# Patient Record
Sex: FEMALE | Race: WHITE | HISPANIC OR LATINO | Employment: UNEMPLOYED | URBAN - METROPOLITAN AREA
[De-identification: names, ages, dates, MRNs, and addresses within clinical notes are randomized per-mention and may not be internally consistent; named-entity substitution may affect disease eponyms.]

---

## 2021-05-10 ENCOUNTER — PATIENT OUTREACH (OUTPATIENT)
Dept: OBGYN CLINIC | Facility: CLINIC | Age: 36
End: 2021-05-10

## 2021-05-10 ENCOUNTER — APPOINTMENT (OUTPATIENT)
Dept: LAB | Facility: HOSPITAL | Age: 36
End: 2021-05-10

## 2021-05-10 ENCOUNTER — ROUTINE PRENATAL (OUTPATIENT)
Dept: OBGYN CLINIC | Facility: CLINIC | Age: 36
End: 2021-05-10

## 2021-05-10 VITALS
BODY MASS INDEX: 31.61 KG/M2 | SYSTOLIC BLOOD PRESSURE: 108 MMHG | DIASTOLIC BLOOD PRESSURE: 76 MMHG | HEART RATE: 123 BPM | WEIGHT: 161 LBS | HEIGHT: 60 IN

## 2021-05-10 DIAGNOSIS — Z3A.24 24 WEEKS GESTATION OF PREGNANCY: ICD-10-CM

## 2021-05-10 DIAGNOSIS — Z34.92 PRENATAL CARE IN SECOND TRIMESTER: Primary | ICD-10-CM

## 2021-05-10 DIAGNOSIS — Z78.9 LANGUAGE BARRIER: ICD-10-CM

## 2021-05-10 LAB
ABO GROUP BLD: NORMAL
BACTERIA UR QL AUTO: ABNORMAL /HPF
BASOPHILS # BLD AUTO: 0.02 THOUSANDS/ΜL (ref 0–0.1)
BASOPHILS NFR BLD AUTO: 0 % (ref 0–1)
BILIRUB UR QL STRIP: ABNORMAL
BLD GP AB SCN SERPL QL: NEGATIVE
CLARITY UR: ABNORMAL
COLOR UR: ABNORMAL
EOSINOPHIL # BLD AUTO: 0 THOUSAND/ΜL (ref 0–0.61)
EOSINOPHIL NFR BLD AUTO: 0 % (ref 0–6)
ERYTHROCYTE [DISTWIDTH] IN BLOOD BY AUTOMATED COUNT: 14.6 % (ref 11.6–15.1)
GLUCOSE UR STRIP-MCNC: NEGATIVE MG/DL
HBV SURFACE AG SER QL: NORMAL
HCT VFR BLD AUTO: 38.7 % (ref 34.8–46.1)
HGB BLD-MCNC: 12.9 G/DL (ref 11.5–15.4)
HGB UR QL STRIP.AUTO: ABNORMAL
HYALINE CASTS #/AREA URNS LPF: ABNORMAL /LPF
IMM GRANULOCYTES # BLD AUTO: 0.05 THOUSAND/UL (ref 0–0.2)
IMM GRANULOCYTES NFR BLD AUTO: 1 % (ref 0–2)
KETONES UR STRIP-MCNC: ABNORMAL MG/DL
LEUKOCYTE ESTERASE UR QL STRIP: ABNORMAL
LYMPHOCYTES # BLD AUTO: 1.56 THOUSANDS/ΜL (ref 0.6–4.47)
LYMPHOCYTES NFR BLD AUTO: 18 % (ref 14–44)
MCH RBC QN AUTO: 28.3 PG (ref 26.8–34.3)
MCHC RBC AUTO-ENTMCNC: 33.3 G/DL (ref 31.4–37.4)
MCV RBC AUTO: 85 FL (ref 82–98)
MONOCYTES # BLD AUTO: 0.39 THOUSAND/ΜL (ref 0.17–1.22)
MONOCYTES NFR BLD AUTO: 4 % (ref 4–12)
NEUTROPHILS # BLD AUTO: 6.86 THOUSANDS/ΜL (ref 1.85–7.62)
NEUTS SEG NFR BLD AUTO: 77 % (ref 43–75)
NITRITE UR QL STRIP: NEGATIVE
NON-SQ EPI CELLS URNS QL MICRO: ABNORMAL /HPF
NRBC BLD AUTO-RTO: 0 /100 WBCS
PH UR STRIP.AUTO: 6.5 [PH]
PLATELET # BLD AUTO: 189 THOUSANDS/UL (ref 149–390)
PMV BLD AUTO: 10 FL (ref 8.9–12.7)
PROT UR STRIP-MCNC: ABNORMAL MG/DL
RBC # BLD AUTO: 4.56 MILLION/UL (ref 3.81–5.12)
RBC #/AREA URNS AUTO: ABNORMAL /HPF
RH BLD: POSITIVE
RPR SER QL: NORMAL
RUBV IGG SERPL IA-ACNC: >175 IU/ML
SP GR UR STRIP.AUTO: 1.03 (ref 1–1.03)
SPECIMEN EXPIRATION DATE: NORMAL
UROBILINOGEN UR QL STRIP.AUTO: 4 E.U./DL
WBC # BLD AUTO: 8.88 THOUSAND/UL (ref 4.31–10.16)
WBC #/AREA URNS AUTO: ABNORMAL /HPF

## 2021-05-10 PROCEDURE — 99203 OFFICE O/P NEW LOW 30 MIN: CPT | Performed by: OBSTETRICS & GYNECOLOGY

## 2021-05-10 PROCEDURE — 87624 HPV HI-RISK TYP POOLED RSLT: CPT | Performed by: OBSTETRICS & GYNECOLOGY

## 2021-05-10 PROCEDURE — G0145 SCR C/V CYTO,THINLAYER,RESCR: HCPCS | Performed by: STUDENT IN AN ORGANIZED HEALTH CARE EDUCATION/TRAINING PROGRAM

## 2021-05-10 PROCEDURE — 87086 URINE CULTURE/COLONY COUNT: CPT

## 2021-05-10 PROCEDURE — 81001 URINALYSIS AUTO W/SCOPE: CPT

## 2021-05-10 PROCEDURE — 83020 HEMOGLOBIN ELECTROPHORESIS: CPT

## 2021-05-10 PROCEDURE — 87591 N.GONORRHOEAE DNA AMP PROB: CPT | Performed by: OBSTETRICS & GYNECOLOGY

## 2021-05-10 PROCEDURE — 36415 COLL VENOUS BLD VENIPUNCTURE: CPT

## 2021-05-10 PROCEDURE — 87491 CHLMYD TRACH DNA AMP PROBE: CPT | Performed by: OBSTETRICS & GYNECOLOGY

## 2021-05-10 PROCEDURE — 80081 OBSTETRIC PANEL INC HIV TSTG: CPT

## 2021-05-10 NOTE — PROGRESS NOTES
MARY JACQUES met with Pt and Provider to assist with interpretation during pre krista h&p visit  Pt came accompanied by her friend  Pt reported she started pre  care in [facility]  Pt came to [address] last Monday  Pt is expecting her second pregnancy  First child was delivered in [facility] via C/S on   Pt did not brought any records with her  Pt main compliant was N/V and body aches  Pt was examined and provider gave name of over the counter medications  Pt will go for lab work today and will return to clinic in 4 weeks  Pt will meet with MARY JACQUES during next visit for pn assessment

## 2021-05-10 NOTE — PATIENT INSTRUCTIONS
[place] 1922 ¡Felicitaciones por Escobar Mantilla! Nos complace que nos haya elegido para ser de león proveedor de keenan servicios de kyle médica risa de león [employer]  De León kyle, la kyle de de león hijo por nacer (niños) y de león nayeli son importante para nosotros  Ahora, más que [place], de león kyle será lo más importante para usted  El crecimiento y el progreso de de león bebé pueden verse afectados por la forma en que usted se cuide  Es [place] buena idea planificar con anticipación  Es un hecho conocido que las mujeres que reciben atención mèdica desde temprano en  Tomas Viktor y risa todo el embarazo tienen bebés más saludables  Se programarán visitas para usted risa todo Tomas Viktor  Es de león deber cumplir con keenan citas y seguir las instrucciones para de león cuidado  El Kavya Rowley de visitas será decidido por de león médico  No tengas miedo de hacer preguntas  Hable con keenan enfermeras y proveedores sobre keenan planes de parto y cualquier inquietud que pueda tener  Janet Simper de Verificación  ; Medicina Materno Fetal (MFM) al [phone] para programar de león kirti   o Kirti de 1 hora, solo se permite 1 persona de apoyo, NO niños    ; Análisis de remy: complete antes de de león kirti de H&P   NO tiene que ayunar para ningún análisis de remy a menos que se lo indiquen  - CBC, Tipo de Gabbie Carmona y 200 Exempla [place] de anticuerpos, Análisis de Anthony Membreno de glucosa al leann, VIH, sífilis, hepatitis B    ; Shahzad Reynolds y física: kirti de H&P   examen físico   Examen pélvico: Ruthell Ralph y Clamidia   Si es necesario: Papanicolaou    Plan:  ; Visitas prenatales de rutina cada 4 semanas hasta las 28 semanas   En keenan visitas prenatales:  - Monitoreo de los el latidos del corazón del bebé y medir de león bethanie en crecimiento, Recolecte herbie Christiana de Lupis liriano, y se tomara de león peso y presión arterial      Señales de [place] en el embarazo: Costella Owens  [phone]    1  Sangrado vaginal  2   Dolor abdominal zurdo que no desaparece  3  Fiebre (más de 100 4 y no se luis miguel con Tylenol)  4  Vómitos persistentes que luis más de 24 horas  5  dolor de pecho  6  Dolor o ardor al orinar  7  Dolor de arina severo que no se resuelve con Tylenol  8  Visión borrosa o nayan puntos en griffin visión  9  Hinchazón repentina de griffin yuki o tima  10  Enrojecimiento, hinchazón o dolor en herbie pierna  11  Un aumento de peso repentino en pocos días  12  Contar los movimientos fetales del bebé  (después de 28 semanas o el sexto mes de embarazo)  15  Herbie pérdida de líquido acuoso de la vagina: puede ser un chorro, un goteo o herbie humedad continua  14  Después de 20 semanas de embarazo, calambres rítmicos (más de 4 por hora) o menstruales chas dolor bajo / pélvico      Manténgase saludable risa griffin embarazo:   · Consuma alimentos saludables y variados  Alimentos saludables incluyen frutas, verduras, panes de francisco integral, alimentos lácteos bajos en grasa, frijoles, alec magras y pescado  Vevay líquidos chas se le haya indicado  Pregunte cuánto líquido debe jayce cada día y cuáles líquidos son los más adecuados para usted  o Cafeína: no está osmar cómo la cafeína afecta el embarazo  Limite griffin consumo de cafeína para evitar posibles problemas de kyle   - Limite la cafeína a menos de 200 miligramos por día  - La cafeína se puede encontrar en el café, el té, la cola, las bebidas deportivas y el chocolate  o  Alimentos que contienen melyssa: el melyssa se encuentra naturalmente en remy todos los tipos de pescados y mariscos  Algunos tipos de peces absorben niveles más altos de melyssa que pueden ser dañinos para un bebé patrick  Coma solo pescado y mariscos bajos en mleyssa  - Limite griffin consumo de pescado a 2 porciones por semana    - Elija pescado con bajo contenido de meylssa, chas atún osmar enlatado, camarones, cangrejo, salmón, bacalao o tilapia   o No coma pescado con alto contenido de [employer] pez miki, el pez [place] norman, la caballa real y el tiburón  - Cada semana, puede comer hasta 12 onzas de pescado o mariscos que tienen bajos niveles de [employer]  Estos incluyen camarones, atún osmar enlatado, salmón, abadejo y [place]  o Coma solo 6 onzas de atún bowser (bowser) por semana  El atún bowser tiene más melyssa que el atún [place]  · [id number] [place]  Griffin necesidad de ciertas vitaminas y [facility], chas el ácido fólico, aumenta risa el [facility]  Las vitaminas prenatales proporcionan algunas de las vitaminas y minerales adicionales que usted necesita  Las vitaminas prenatales también podrían ayudar a disminuir el riesgo de ciertos defectos de nacimiento  · Pregunte cuánto peso usted debe aumentar risa griffin embarazo  Demasiado aumento de peso o muy poco puede ser poco saludable para usted y griffin bebé  · Ejercicio: hable con griffin proveedor de [employer] ejercicio  El ejercicio moderado puede ayudarlo a mantenerse en forma  Griffin proveedor de [employer] ayudará a planificar un programa de ejercicios que sea seguro para usted risa el [facility]  · Ladi muchos líquidos mientras hace ejercicio para mantenerse hidratado  Tenga cuidado para evitar el sobrecalentamiento  o EVITE los ejercicios que pueden hacer que pierda el equilibrio   o Actividades que pueden poner a griffin bebé en riesgo, es decir, montar a gabby, bucear, esquiar o hacer snowboard  o Después de griffin primer trimestre, evite los ejercicios que requieren que se acueste boca arriba   o EVITE exceder herbie frecuencia cardíaca mayor de 140 latidos por minuto  Siempre que pueda mantener herbie conversación mientras hace ejercicio, es probable que griffin ritmo cardíaco sea aceptable   ; Siempre use el cinturón de seguridad   El cinturón de hombro debe estar sobre el pecho (entre los senos) y lejos del faby     Asegure el cinturón de regazo debajo de griffin vientre para que se ajuste cómodamente en keenan caderas y Romeo pélvico   ; Realizar el ejercicio de [place]   Imagínese deteniendo el flujo de orina, contrayendo los músculos de griffin piso pélvico  Mantenga ramona contracción risa 10 segundos y suelte   Se pueden repetir 10-20 veces por día  · No fume  Si usted fuma, nunca es demasiado tarde para dejar de hacerlo  Fumar aumenta el riesgo de aborto espontáneo y otros problemas de kyle risa griffin Veola Hung  Fumar puede causar que griffin bebé nazca antes de tiempo o que pese menos al nacer  Solicite información a griffin médico si usted necesita ayuda para dejar de fumar  · No consuma alcohol  El alcohol pasa de griffin cuerpo al bebé a través de la placenta  Puede afectar el desarrollo del cerebro de griffin bebé y provocar el síndrome de alcoholismo fetal (SAF)  SAF es un seymour de condiciones que causan [address], de comportamiento y de crecimiento  · Consulte con griffin médico antes de jayce cualquier medicamento  Muchos medicamentos pueden perjudicar a griffin bebé si usted los kevin [address]  No tome ningún medicamento, vitaminas, hierbas o suplementos sin petar consultar con griffin médico    · Nunca  use drogas ilegales o de la [place] (chas marihuana o cocaína) mientras está embarazada  Consejos de seguridad:   · Evite jacuzzis y saunas  No use un jacuzzi o un sauna mientras usted está embarazada, especialmente risa el primer trimestre  Los [facility] y los saunas aumentan la temperatura de griffin bebé y el riesgo de defectos de nacimiento  · Evite la toxoplasmosis  [place] es herbie infección causada por comer carne cruda o estar cerca del excremento de un tera infectado  [place] puede causar malformaciones congénitas, aborto espontáneo y Khoi Goodwinese las tima después de tocar carne cruda  Asegúrese de que la carne esté annika cocida antes de comerla  Evite los huevos crudos y la [place]ta [place]  Use guantes o pida que alguien la ayude a limpiar la caja de arena del tera mientras usted Otila Hopper     · Consulte con griffin médico acerca de viajar  El [address] cómodo para viajar es risa el ryder trimestre  Pregunte a griffin médico si usted puede viajar después de las 36 semanas  Es posible que no pueda viajar en avión después de las [address]  También le puede recomendar que evite largos viajes por carretera  Programe un control con griffin médico u obstetra según lo indicado:  Vaya a todas keenan citas prenatales risa griffin embarazo  Anote keenan preguntas para que se acuerde de hacerlas risa keenan visitas  [place] y vómito en el embarazo       CUIDADO AMBULATORIO:   La náusea y el vómito en el embarazo  pueden suceder a cualquier hora del día  Estos síntomas usualmente comienzan antes de la semana 9 del embarazo y terminan para la semana 14 (ryder trimestre)  Algunas mujeres pueden tener náusea o vómito por un tiempo prolongado  Estos síntomas pueden afectar a algunas mujeres risa el embarazo  La náusea y el vómito no dañan a griffin bebé  Estos síntomas pueden dificultarle keenan actividades diarias  Pregúntele a griffin Pepper Chance vitaminas y minerales son adecuados para usted  · Usted vomita más de 4 veces en 1 día  · Usted no ha podido retener líquidos en el estómago por más de 1 día  · Usted pierde más de 2 libras  · Usted tiene fiebre  · Keenan náuseas y vómito continúan por más de 14 semanas  · Usted tiene preguntas o inquietudes acerca de griffin condición o cuidado  El tratamiento  para la náusea y el vómito en el embarazo generalmente no es necesario  Usted puede [employer] alimentos que come y en keenan actividades para ayudar a controlar keenan síntomas  Es posible que usted necesite probar varias cosas para determinar qué funciona mejor para usted  Hable con griffin médico si keenan síntomas no mejoran con los cambios que se recomiendan a continuación  Es posible que usted necesite vitamina B6 y medicamento si estos cambios no ayudan o si keenan síntomas se vuelven graves     Cambios de nutrición que usted puede realizar para controlar la náusea y el vómito:   · Coma porciones pequeñas risa el día en vez de 3 comidas con porciones grandes  Es más probable que usted tenga náusea y vómito cuando griffin estómago está vacío  Consuma alimentos bajos en grasa y ricos en proteínas  Ejemplos son Colan Landau, frijoles, pavo y qasim sin andreina Diehl, chas galletas saladas, cereal seco o un sandwich chico antes de [facility] [employer]  · Coma galletas saladas o pan sarna antes de levantarse de griffin cama por la mañana  Levántese de la cama lentamente  Los movimientos repentinos podrían provocarle mareos y [place]  · Consuma alimentos blandos cuando se sienta con náuseas  Ejemplos de alimentos blandos son el pan saran, cereal seco, pasta sin Marshal Furlough y pan  Otros alimentos blandos incluyen a las [employer], plátanos, gelatina y pretzels  Evite los alimentos condimentados, grasosos y fritos  Evite otros alimentos que le provoquen náuseas  · [place] líquidos que contengan gengibre  [place] refresco de gengibre hecho con gengibre real o té de gengibre hecho con gengibre fresco rallado  Las [employer] o dulces de gengibre también podrían ayudar a aliviar la náusea y el vómito  · [place] líquidos entre alimentos en vez de tomarlos con los alimentos  Espere al menos 30 minutos después de comer para jayce líquidos  [place] cantidades pequeñas de líquidos con frecuencia risa el día para evitar la deshidratación  Consulte cuál es la cantidad de líquido que usted debería consumir al día  Otros cambios que usted puede realizar para controlar la náusea y el vómito:   · Evite los olores que la [place]  Los olores stevie podrían provocar que [employer] náuseas y el vómito, o podrían empeorarlo  Camine un poco, prenda un ventilador o trate de dormir con la ventana abierta para respirar aire fresco  Cuando esté cocinando, scot las ventanas para eliminar el olor que podría provocarle náuseas    · No se cepille keenan dientes inmediatamente después de comer  si eso le provoca náuseas  · Descanse cuando lo necesite  Comience herbie actividad lentamente y vuelva a griffin rutina normal conforme se empiece a sentir mejor  · Hable con griffin médico acerca de las vitaminas prenatales  Las vitaminas prenatales pueden provocar náuseas a algunas mujeres  Trate de tomárselas por la noche o con un bocadillo  Si butch cambio no le [place], griffin médico podría recomendarle un tipo de vitamina diferente  · No use ningún medicamento, vitamina o suplemento para controlar keenan síntomas sin antes consultarlo con griffin médico   Varios medicamentos pueden dañar a griffin bebé que no ha nacido  · El ejercicio de ligero a moderado  podría ayudar a aliviar keenan síntomas  También podría ayudarla a dormir mejor por la noche  Pregunte a griffin médico acerca del mejor plan de ejercicio para usted  Beneficios de la lactancia materna   son menos propensos a desarrollar alergias   Tienen herbie mayor protección contra la meningitis bacteriana   Tienen menos infecciones del oído medio   Tienen menos dificultades de aprendizaje    Tienen menos dificultades de comportamiento   Tienen un coeficiente intelectual más alto   Tienen tasas más bajas de enfermedades respiratorias   Tienen oscar obesidad    Son menos propensos a desarrollar diabetes juvenil y algunos cánceres infantiles   Tienen menos probabilidades de morir por Síndrome de erte [facility]   Un bebé más saludable significa menos visitas al médico y a la farmacia   La lactancia materna es ecológica  No hay nada que tirar   La lactancia materna es gratis; La fórmula puede costar más de $ 1000 risa el primer año de lakesha   Hay menos sangrado vaginal inmediatamente después del parto y un retorno más rápido a griffin tamaño anterior al [facility]  Las [place] que amamantan risa un total de 2 años tienen  ;  Herbie disminución del 40% en el riesgo de cáncer de seno  ; Disminución del riesgo de cáncer de ovario   ; Tasas más bajas de osteoporosis, diabetes y enfermedades del corazón  Importancia de la lactancia materna exclusiva   Al proporcionar el alimento ideal para el crecimiento y desarrollo saludable de los bebés, solo se les alimenta con [place], esto es amamantamiento exclusivo   La lactancia materna [facility] primeros 6 meses es muy importante para mejorar la kyle de un bebé y reducir las enfermedades infantiles  Lactancia materna exclusiva risa los primeros 6 meses  [address] [facility] Estadounid[facility] de Pediatría recomienda la lactancia materna exclusiva risa los primeros seis meses y la lactancia materna continua con suplementos de sólidos risa los próximos 6 meses  Inicio temprano de la lactancia materna   Las mujeres que alimentan a keenan bebés dentro de la primera hora de nacimiento se conocen chas inicio temprano de la lactancia materna y aseguran que el recién nacido reciba calostro   El calostro es rico en anticuerpos y nutrientes esenciales  Compartiendo la habitación   Puede estabilizar la respiración y la frecuencia cardíaca del recién nacido   Reduce el llanto   Mejorar la capacidad del recién nacido para mantener la temperatura y los niveles de azúcar en la remy   Estimulación temprana del sistema inmunitario del bebé   efectos calmantes   Enlace más fácil y rápido   El compartir la habitación promueve conocer a griffin recién nacido   El alojamiento conjunto facilita la lactancia materna   Las mujeres que se alojan con keenan recién nacidos producen [place] y producen un buen suministro de [place]   Se hace más fácil reconocer las señales de alimentación del bebé   Los bebés tienen sesiones de lactancia más largas   Las mujeres que comparten habitación con keenan recién nacidos tienen más probabilidades de amamantar exclusivamente en comparación con las mujeres que están separadas de keenan recién nacidos    Piel con piel   El contacto piel con piel debe ocurrir independientemente de la preferencia de alimentación de herbie jitendra o el modo de [place]   Después del parto de griffin bebé, es importante que herbie madre esther y griffin bebé tengan un contacto ininterrumpido de piel a piel   El contacto piel con piel debe ocurrir inmediatamente después del nacimiento risa al menos herbie o dos horas y [place] después de la primera alimentación para las madres que [place]   El contacto piel con piel significa colocar recién nacidos secos y sin ropa sobre el pecho desnudo de griffin [place], con mantas calientes cubriendo la espalda del bebé   Todos los procedimientos de rutina, chas las evaluaciones de [place] y recién nacidos, pueden realizarse risa el contacto piel con piel o pueden retrasarse hasta después del período sensible inmediatamente después del nacimiento   Estamos orgullosos de ofrecer amplios servicios educativos y de apoyo para alentar a las madres a amamantar   Soniya servicio ofrece información, educación y [de-identified] para mujeres que yosi amamantar  Las [place] pueden dejar un mensaje o herbie pregunta sobre la lactancia en línea en cualquier momento del día  Las enfermeras responderán dentro de las 72 horas   La línea de respuesta de lactancia materna es [phone] ([phone])  NO deje mensajes urgentes o emergentes en esta línea de mensaje  Comuníquese con griffin médico Tereasa Battiest si tiene alguna pregunta o inquietud urgente   En muna de sospecha de herbie afección médica de emergencia, [facility] [place]      Attaching your baby at the Breast (English): [email or web address] [email or web address]-items/attaching-[email or web address]-baby-[email or web address]-the-breast/?[email or web address]    Attaching your baby at the Breast (French):  [email or web address] [email or web address]-items/[email or web address]      Coronavirus (COVID-19) pregnancy FAQs: Medical experts answer your questions  From [employer] com cy  [email or web address]  bc (courtesy of [facility])  As of 3/18/20  By Dontae Mims 40  Medically reviewed by Society for Maternal-Fetal Medicine       We asked parents-to-be to send us their most pressing questions about coronavirus (COVID-19)  Among them: Is it still safe to deliver in a hospital? What if my ob-gyn has the virus? We sent those questions to the top docs at the Society for Maternal-Fetal Medicine  Here are their answers  The coronavirus (COVID-19) pandemic has been declared a national emergency in the [facility] by [employer]  Moms-to-be like you are concerned about everything from getting medicines to managing disruptions at work  But above and beyond any worry about lifestyle changes is a focus on your health and the impact of COVID-19 on your pregnancy  We asked obstetrics doctors who handle the most complicated pregnancy issues to answer your questions about the coronavirus  Here are their responses, provided by Dr Michaela Kennedy and her colleagues at the Society for Reform 250  Am I at more risk for COVID-19 if I'm pregnant? We don't know  Pregnancy does change your immune system in ways that might make you more susceptible to viral respiratory infections like COVID-19  If you become infected, you might also be at higher risk for more severe illness compared to the general population  Although this does not appear to be the case with COVID-19, based on limited data so far, a higher risk has been true for pregnant women with other coronavirus infections (SARS-CoV and MERS-CoV) and other viral respiratory infections, such as flu  It's important to take preventive actions to avoid infection, such as washing your hands often and avoiding people who are sick  How might coronavirus affect my pregnancy? Again, we don't know   Women with other coronavirus infections (such as SARS-CoV) did not have miscarriage or stillbirth at higher rates than the general population  We know that having other respiratory viral infections during pregnancy, such as flu, has been associated with problems like low birth weight and  birth  Also, having a high fever early in pregnancy may increase the risk of certain birth defects  Could I transmit coronavirus to my baby during pregnancy or delivery? We don't know whether you could transmit COVID-19 to your baby before or during delivery  However, among the few case studies of infants born to mothers with COVID-23 published in peer-reviewed literature, none of the infants tested positive for the virus  Also, there was no virus detected in samples of amniotic fluid or breast milk  There have been a few reports of newborns as young as a few days old with infection, suggesting that a mother can transmit the infection to her infant through close contact after delivery  There have been no reports of mother-to-baby transmission for other coronaviruses (MERS-CoV and SARS-CoV), although only limited information is available  Is it safe for me to deliver at a hospital where there have been COVID-19 cases? Yes  We know that COVID-19 is a very scary virus  The good news is that hospitals are taking great precautions to keep patients and healthcare providers safe  When a patient is even suspected to have COVID-19, they are placed in a negative pressure room  (Think of these rooms as vacuums that suck and filter the air so it's safe for the other people in the hospital)  This makes it possible for you to deliver at the hospital without putting you or your baby at risk  Hospitals are also implementing stricter visiting policies to keep patients safe  It's worth calling your hospital to check if there are any new regulations to be aware of      What plans should I make now in case the hospital system is overwhelmed when it's time for me to deliver? This is a great question to talk with your doctor or midwife about when you're 34 to 36 weeks pregnant  Every hospital is making different plans for dealing with this scenario  I work in healthcare  Should I ask my doctor to excuse me from work until the baby is born? What if I work in a school, the travel [place] [address], or some other high-risk setting? Healthcare facilities should take care to limit the exposure of pregnant employees to patients with confirmed or suspected COVID-19, just as they would with other infectious cases  If you continue working, be sure to follow the CDC's risk assessment and infection control guidelines  If you work in a school, travel [place] [address], or other high-risk setting, talk with your employer about what it's doing to protect employees and minimize infection risks  Wash your hands often  What if my OB gets COVID-19? If your doctor or midwife tests positive for COVID-19, they will need to be quarantined until they recover and are no longer at risk of transmitting the virus  In this case, you'll be assigned to another OB in your doctor's practice (or you may choose another practitioner yourself)  Ask your new OB or your doctor's office if you should self-quarantine or be tested for the virus  (It will depend on when you last saw your provider and when that person tested positive )    Should we hold off on trying to conceive because of COVID-19? At this time, there's no reason to hold off on trying to get pregnant, but the data we have is really limited  For example, we don't think the virus causes birth defects or increases your risk of miscarriage  But we don't know whether you could transmit COVID-19 to your baby before or during delivery  We also don't know if the virus lives in semen or can be sexually transmitted  We have a babymoon scheduled in the next few months - should we cancel?   If you're planning to travel internationally or on a cruise, you should strongly consider canceling  At this time, the virus has reached more than 140 countries, and there are travel bans to [place], most of [place], and [place]  Places where large numbers of people gather are at highest risk, especially airports and cruise [place]  If you're planning travel in the [place] note that any travel setting increases your risk of exposure, and there may be travel bans even in [place] by the time you're scheduled to go  Even if you're state is not blocked, you'll definitely want to avoid traveling to communities where the virus is spreading  To find out where these places are, check The [place] Times map based on CDC data  For the most current advice to help you avoid exposure, check the CDC's COVID-19 travel page  Will the hospital separate me from my  and keep the baby in quarantine? If you test positive for COVID-19 or have been exposed but have no symptoms, the CDC, [employer], and the Society for [place] [employer] all recommend that you be  from your baby to decrease the risk of transmission to the baby  This would last until you are no longer at risk of transmitting the virus  If you do not have COVID-19 and have not been exposed to the virus, the hospital will not separate you from your   My hospital is restricting visitors and only allowing one support person  If my support person leaves after the delivery, will they be allowed to come back? Every hospital has different policies  Contact your hospital or labor and delivery unit a week or so before delivery to get the most up-to-date restrictions  In general, if your support person needs to leave, they would be allowed back unless they knew they were exposed to COVID-19 after leaving your company  [facility] understands that the coronavirus (COVID-19) pandemic is an evolving story and that your questions will change over time   We'll continue asking moms and dads in our Community what they want to know, and we'll get the answers from experts to keep them - and you - informed and supported  My mom was planning to fly here to help me care for my new baby after delivery  Should I tell her not to come? Yes  If your mom is over 61 or has any serious chronic medical conditions (such as heart disease, lung disease, or diabetes), she is at higher risk of serious illness from COVID-19 and should avoid air travel  And remember that any travel setting increases a person's risk of exposure  So, it may be risky to have her around the baby after she has been traveling  For the most current advice on traveling, check the [facility]'s COVID-19 travel page  [facility] understands that the coronavirus pandemic is an evolving story and that your questions will change over time  We'll continue asking moms and dads in our Community what they want to know, and we'll get the answers from experts to keep them - and you - informed and supported

## 2021-05-11 LAB
BACTERIA UR CULT: NORMAL
C TRACH DNA SPEC QL NAA+PROBE: NEGATIVE
HIV 1+2 AB+HIV1 P24 AG SERPL QL IA: NORMAL
N GONORRHOEA DNA SPEC QL NAA+PROBE: NEGATIVE

## 2021-05-12 ENCOUNTER — HOSPITAL ENCOUNTER (EMERGENCY)
Facility: HOSPITAL | Age: 36
Discharge: HOME | End: 2021-05-12
Attending: EMERGENCY MEDICINE | Admitting: EMERGENCY MEDICINE
Payer: COMMERCIAL

## 2021-05-12 VITALS
TEMPERATURE: 98.8 F | DIASTOLIC BLOOD PRESSURE: 77 MMHG | RESPIRATION RATE: 18 BRPM | HEART RATE: 84 BPM | BODY MASS INDEX: 30.89 KG/M2 | OXYGEN SATURATION: 99 % | WEIGHT: 159.39 LBS | SYSTOLIC BLOOD PRESSURE: 101 MMHG

## 2021-05-12 DIAGNOSIS — Z20.822 SUSPECTED COVID-19 VIRUS INFECTION: Primary | ICD-10-CM

## 2021-05-12 LAB
HGB A MFR BLD: 2.4 % (ref 1.8–3.2)
HGB A MFR BLD: 97.6 % (ref 96.4–98.8)
HGB F MFR BLD: 0 % (ref 0–2)
HGB FRACT BLD-IMP: NORMAL
HGB S MFR BLD: 0 %
HPV HR 12 DNA CVX QL NAA+PROBE: NEGATIVE
HPV16 DNA CVX QL NAA+PROBE: NEGATIVE
HPV18 DNA CVX QL NAA+PROBE: NEGATIVE
SARS-COV-2 RNA RESP QL NAA+PROBE: POSITIVE

## 2021-05-12 PROCEDURE — U0005 INFEC AGEN DETEC AMPLI PROBE: HCPCS | Performed by: PHYSICIAN ASSISTANT

## 2021-05-12 PROCEDURE — U0003 INFECTIOUS AGENT DETECTION BY NUCLEIC ACID (DNA OR RNA); SEVERE ACUTE RESPIRATORY SYNDROME CORONAVIRUS 2 (SARS-COV-2) (CORONAVIRUS DISEASE [COVID-19]), AMPLIFIED PROBE TECHNIQUE, MAKING USE OF HIGH THROUGHPUT TECHNOLOGIES AS DESCRIBED BY CMS-2020-01-R: HCPCS | Performed by: PHYSICIAN ASSISTANT

## 2021-05-12 PROCEDURE — 99284 EMERGENCY DEPT VISIT MOD MDM: CPT | Performed by: PHYSICIAN ASSISTANT

## 2021-05-12 PROCEDURE — 99283 EMERGENCY DEPT VISIT LOW MDM: CPT

## 2021-05-12 RX ORDER — MELATONIN
2000 DAILY
Qty: 60 TABLET | Refills: 0 | Status: SHIPPED | OUTPATIENT
Start: 2021-05-12

## 2021-05-12 RX ORDER — MULTIVIT WITH MINERALS/LUTEIN
1000 TABLET ORAL 2 TIMES DAILY
Qty: 60 TABLET | Refills: 0 | Status: SHIPPED | OUTPATIENT
Start: 2021-05-12

## 2021-05-12 RX ORDER — MULTIVITAMIN
1 TABLET ORAL DAILY
Qty: 30 TABLET | Refills: 0 | Status: SHIPPED | OUTPATIENT
Start: 2021-05-12

## 2021-05-12 NOTE — ED PROVIDER NOTES
History  Chief Complaint   Patient presents with    Cough     cough and sore throat x 2 days       This is a 31 y/o female who presents with cough, sore throat, and bodyaches  She admits to Recent travel from [facility]  Cough, sore throat, body aches x last    with similar symptoms    She is currently 24 weeks pregnant , pregnancy uncomplicated and sees OB/GYN here  Denies SOB, CP or n/v/d  She denies headache  She is able to keep down fluids and does not have any diarrhea  None       History reviewed  No pertinent past medical history  History reviewed  No pertinent surgical history  History reviewed  No pertinent family history  I have reviewed and agree with the history as documented  E-Cigarette/Vaping     E-Cigarette/Vaping Substances     Social History     Tobacco Use    Smoking status: Never Smoker    Smokeless tobacco: Never Used   Substance Use Topics    Alcohol use: Never     Frequency: Never    Drug use: Never       Review of Systems   Constitutional: Positive for fatigue and fever  Negative for chills  HENT: Positive for congestion and sore throat  Negative for ear pain and postnasal drip  Eyes: Negative for pain and visual disturbance  Respiratory: Positive for cough  Negative for shortness of breath  Cardiovascular: Negative for chest pain and palpitations  Gastrointestinal: Negative for abdominal pain and vomiting  Genitourinary: Negative for dysuria and hematuria  Musculoskeletal: Negative for arthralgias and back pain  Skin: Negative for color change and rash  Neurological: Negative for seizures, syncope, light-headedness and headaches  All other systems reviewed and are negative  Physical Exam  Physical Exam  Vitals signs and nursing note reviewed  Constitutional:       General: She is not in acute distress  Appearance: She is well-developed  She is not ill-appearing, toxic-appearing or diaphoretic     HENT: Head: Normocephalic and atraumatic  Eyes:      Conjunctiva/sclera: Conjunctivae normal    Neck:      Musculoskeletal: Neck supple  Cardiovascular:      Rate and Rhythm: Normal rate and regular rhythm  Heart sounds: No murmur  Pulmonary:      Effort: Pulmonary effort is normal  No respiratory distress  Breath sounds: Normal breath sounds  No stridor  No wheezing, rhonchi or rales  Abdominal:      Palpations: Abdomen is soft  Tenderness: There is no abdominal tenderness  Comments: Gravid     Musculoskeletal: Normal range of motion  Skin:     General: Skin is warm and dry  Neurological:      General: No focal deficit present  Mental Status: She is alert  Psychiatric:         Mood and Affect: Mood normal          Behavior: Behavior normal          Vital Signs  ED Triage Vitals [05/12/21 1100]   Temperature Pulse Respirations Blood Pressure SpO2   98 8 °F (37 1 °C) 84 18 101/77 99 %      Temp Source Heart Rate Source Patient Position - Orthostatic VS BP Location FiO2 (%)   Oral -- -- Left arm --      Pain Score       Worst Possible Pain           Vitals:    05/12/21 1100   BP: 101/77   Pulse: 84         Visual Acuity      ED Medications  Medications - No data to display    Diagnostic Studies  Results Reviewed     Procedure Component Value Units Date/Time    Novel Coronavirus (Covid-19),PCR [facility] - 24 Hour Routine [[id number]]     Lab Status: No result Specimen: Nasopharyngeal Swab                  No orders to display              Procedures  Procedures         ED Course  ED Course as of May 12 1215   Wed May 12, 2021   1139                                 SBIRT 22yo+      Most Recent Value   SBIRT (25 yo +)   In order to provide better care to our patients, we are screening all of our patients for alcohol and drug use  Would it be okay to ask you these screening questions? No Filed at: 05/12/2021 1109   Initial Alcohol Screen: US AUDIT-C    1   How often do you have a drink containing alcohol?  0 Filed at: 05/12/2021 1109   2  How many drinks containing alcohol do you have on a typical day you are drinking? 0 Filed at: 05/12/2021 1109   3a  Male UNDER 65: How often do you have five or more drinks on one occasion? 0 Filed at: 05/12/2021 1109   3b  FEMALE Any Age, or MALE 65+: How often do you have 4 or more drinks on one occassion? 0 Filed at: 05/12/2021 1109   Audit-C Score  0 Filed at: 05/12/2021 1109   KELLE: How many times in the past year have you    Used an illegal drug or used a prescription medication for non-medical reasons? Never Filed at: 05/12/2021 1109                    MDM  Number of Diagnoses or Management Options  Suspected COVID-19 virus infection: new and requires workup  Diagnosis management comments: Patient was seen and examined  Vitals were stable  FHTs good  Strict anticipatory guidance and return precaution were discussed at length  She was given rx for vitamin D, vitamin C, MVI  Primary care referral to [facility] was also ordered  All questions were answered to her satisfaction prior to discharge via the   She remained stable while under my care in the Emergency department  Amount and/or Complexity of Data Reviewed  Clinical lab tests: ordered    Risk of Complications, Morbidity, and/or Mortality  Presenting problems: low  Diagnostic procedures: low  Management options: low    Patient Progress  Patient progress: stable      Disposition  Final diagnoses:   None     ED Disposition     None      Follow-up Information    None         Patient's Medications    No medications on file     No discharge procedures on file      PDMP Review     None          ED Provider  Electronically Signed by           Rakesh Monique PA-C  05/12/21 2344

## 2021-05-12 NOTE — DISCHARGE INSTRUCTIONS
You may start:    Vitamin D3 2000 IU PO Daily  Vitamin C 1g PO every 12 hours  Multivitamin Daily    Please return to the ED if your O2 levels drop below 90% at rest if you have a pulse ox at home

## 2021-05-14 LAB
LAB AP GYN PRIMARY INTERPRETATION: NORMAL
Lab: NORMAL

## 2021-05-18 ENCOUNTER — TELEMEDICINE (OUTPATIENT)
Dept: OBGYN CLINIC | Facility: CLINIC | Age: 36
End: 2021-05-18

## 2021-05-18 DIAGNOSIS — U07.1 COVID-19 AFFECTING PREGNANCY IN SECOND TRIMESTER: Primary | ICD-10-CM

## 2021-05-18 DIAGNOSIS — Z34.92 PRENATAL CARE IN SECOND TRIMESTER: ICD-10-CM

## 2021-05-18 DIAGNOSIS — Z3A.25 25 WEEKS GESTATION OF PREGNANCY: ICD-10-CM

## 2021-05-18 DIAGNOSIS — Z98.891 HISTORY OF CESAREAN DELIVERY: ICD-10-CM

## 2021-05-18 DIAGNOSIS — O98.512 COVID-19 AFFECTING PREGNANCY IN SECOND TRIMESTER: Primary | ICD-10-CM

## 2021-05-18 PROCEDURE — PNV: Performed by: NURSE PRACTITIONER

## 2021-05-18 NOTE — LETTER
Work Letter    Alea Goodrich  1985  [address] [facility] [address]    Dear Sergei Askew,      05/18/21        Your employee is a patient at [facility]  We recommend that all pregnant women:    1  Have a well-ventilated workspace  2  Wear low-heeled shoes  3  Work no more than 40 hours per week  4  Have a 15 minute break every 2 hours and at least 30 minutes for a meal break  5  Use good body mechanics by bending at your knees to avoid back strain and lift no more than 20 pounds without assistance  Will need assistance with lifting over 20 lbs  6  Have ready access to bathrooms and water  She may continue to work until her due date unless medical complications arise  We anticipate she may return to work in 6-8 weeks after delivery       Sincerely,  1 Mike Lam

## 2021-05-18 NOTE — LETTER
[facility] Letter    Momo Love  1985  [address] [facility] [address] [address]       05/18/21          Momo Love is a patient and under our care in our office  Momo Castillo's Estimated Date of Delivery: 8/28/21  Any questions or concerns feel free to contact our office       Thank you,  [address]  [address] [facility]/Laisha Chavez 15  [address] [facility]/Erick Pillai [address] [place] [address]/[address]  [phone]

## 2021-05-18 NOTE — LETTER
Proof of Pregnancy Letter    Hansel Navarro  1985  [address] [facility] [address]        05/18/21      Dary Crespo is a patient at our facility  Hansel Navarro Estimated Date of Delivery: 8/28/21       Any questions or concerns, please feel free to contact our office      Sincerely,    [facility]    [address]   [place], [address] [facility]   [phone]

## 2021-05-19 NOTE — PATIENT INSTRUCTIONS
[place] 1922 ¡Felicitaciones por [place] Ulloa! Nos complace que nos haya elegido para ser de león proveedor de keenan servicios de kyle médica risa de león [place]  De León kyle, la kyle de de león hijo por nacer (niños) y de león nayeli son importante para nosotros  Ahora, más que [place], de león kyle será lo más importante para usted  El crecimiento y el progreso de de león bebé pueden verse afectados por la forma en que usted se cuide  Es [facility] buena idea planificar con anticipación  Es un hecho conocido que las mujeres que reciben atención mèdica desde temprano en  Chetna Mini y risa todo el embarazo tienen bebés más saludables  Se programarán visitas para usted risa todo [place] Mini  Es de león deber cumplir con keenan citas y seguir las instrucciones para de león cuidado  El [place] de visitas será decidido por de león médico  No tengas miedo de hacer preguntas  Hable con keenan enfermeras y proveedores sobre keenan planes de parto y cualquier inquietud que pueda tener  [place] de Verificación  ; Medicina Materno Fetal (MFM) al [phone] para programar de león kirti   o Kirti de 1 hora, solo se permite 1 persona de apoyo, NO niños    ; Análisis de remy: complete antes de de león kirti de H&P   NO tiene que ayunar para ningún análisis de remy a menos que se lo indiquen  - CBC, Tipo de [place] y 200 Exempla Cambria de anticuerpos, Análisis de [place], [place] de glucosa al leann, VIH, sífilis, hepatitis B    ; Edwena [place] y física: kirti de H&P   examen físico   Examen pélvico: Shreyas Catena y Clamidia   Si es necesario: Papan[place]olaou    Plan:  ; Visitas prenatales de rutina cada 4 semanas hasta las 28 semanas   En keenan visitas prenatales:  - Monitoreo de los el latidos del corazón del bebé y medir de león bethanie en crecimiento, Recolecte herbie [facility], y se tomara de león peso y presión arterial      Señales de [place] en el embarazo: Bernardino Cabrera  [phone]    1  Sangrado vaginal  2   Dolor abdominal zurdo que no desaparece  3  Fiebre (más de 100 4 y no se luis miguel con Tylenol)  4  Vómitos persistentes que luis más de 24 horas  5  dolor de pecho  6  Dolor o ardor al orinar  7  Dolor de arina severo que no se resuelve con Tylenol  8  Visión borrosa o nayan puntos en griffin visión  9  Hinchazón repentina de griffin yuki o tima  10  Enrojecimiento, hinchazón o dolor en herbie pierna  11  Un aumento de peso repentino en pocos días  12  Contar los movimientos fetales del bebé  (después de 28 semanas o el sexto mes de embarazo)  15  Herbie pérdida de líquido acuoso de la vagina: puede ser un chorro, un goteo o herbie humedad continua  14  Después de 20 semanas de embarazo, calambres rítmicos (más de 4 por hora) o menstruales chas dolor bajo / pélvico      Manténgase saludable risa griffin embarazo:   · Consuma alimentos saludables y variados  Alimentos saludables incluyen frutas, verduras, panes de francisco integral, alimentos lácteos bajos en grasa, frijoles, alec magras y pescado  [place] líquidos chas se le haya indicado  Pregunte cuánto líquido debe jayce cada día y cuáles líquidos son los más adecuados para usted  o Cafeína: no está osmar cómo la cafeína afecta el embarazo  Limite griffin consumo de cafeína para evitar posibles problemas de kyle   - Limite la cafeína a menos de 200 miligramos por día  - La cafeína se puede encontrar en el café, el té, la cola, las bebidas deportivas y el chocolate  o  Alimentos que contienen melyssa: el melyssa se encuentra naturalmente en remy todos los tipos de pescados y mariscos  Algunos tipos de peces absorben niveles más altos de melyssa que pueden ser dañinos para un bebé patrick  Coma solo pescado y mariscos bajos en melyssa  - Limite griffin consumo de pescado a 2 porciones por semana    - Elija pescado con bajo contenido de melyssa, chas atún osmar enlatado, camarones, cangrejo, salmón, bacalao o tilapia   o No coma pescado con alto contenido de [employer] pez miki, el pez [place] norman, la caballa real y el tiburón  - Cada semana, puede comer hasta 12 onzas de pescado o mariscos que tienen bajos niveles de [employer]  Estos incluyen camarones, atún osmar enlatado, salmón, abadejo y [place]  o Coma solo 6 onzas de atún bowser (bowser) por semana  El atún bowser tiene más melyssa que el atún [place]  · [address] [place]  Griffin necesidad de ciertas vitaminas y [facility], chas el ácido fólico, aumenta risa el Raydell Reaper  Las vitaminas prenatales proporcionan algunas de las vitaminas y minerales adicionales que usted necesita  Las vitaminas prenatales también podrían ayudar a disminuir el riesgo de ciertos defectos de nacimiento  · Pregunte cuánto peso usted debe aumentar risa griffin embarazo  Demasiado aumento de peso o muy poco puede ser poco saludable para usted y griffin bebé  · Ejercicio: hable con griffin proveedor de [employer] ejercicio  El ejercicio moderado puede ayudarlo a mantenerse en forma  Griffin proveedor de [employer] ayudará a planificar un programa de ejercicios que sea seguro para usted risa el Raydell Reaper  · Ladi muchos líquidos mientras hace ejercicio para mantenerse hidratado  Tenga cuidado para evitar el sobrecalentamiento  o EVITE los ejercicios que pueden hacer que pierda el equilibrio   o Actividades que pueden poner a griffin bebé en riesgo, es decir, montar a gabby, bucear, esquiar o hacer snowboard  o Después de griffin primer trimestre, evite los ejercicios que requieren que se acueste boca arriba   o EVITE exceder herbie frecuencia cardíaca mayor de 140 latidos por minuto  Siempre que pueda mantener herbie conversación mientras hace ejercicio, es probable que griffin ritmo cardíaco sea aceptable   ; Siempre use el cinturón de seguridad   El cinturón de hombro debe estar sobre el pecho (entre los senos) y lejos del faby     Asegure el cinturón de regazo debajo de griffin vientre para que se ajuste cómodamente en keenan caderas y [place] pélvico   ; Realizar el ejercicio de [place]   Imagínese deteniendo el flujo de orina, contrayendo los músculos de griffin piso pélvico  Mantenga ramona contracción risa 10 segundos y suelte   Se pueden repetir 10-20 veces por día  · No fume  Si usted fuma, nunca es demasiado tarde para dejar de hacerlo  Fumar aumenta el riesgo de aborto espontáneo y otros problemas de kyle risa griffin [place]  Fumar puede causar que griffin bebé nazca antes de tiempo o que pese menos al nacer  Solicite información a griffin médico si usted necesita ayuda para dejar de fumar  · No consuma alcohol  El alcohol pasa de griffin cuerpo al bebé a través de la placenta  Puede afectar el desarrollo del cerebro de griffin bebé y provocar el síndrome de alcoholismo fetal (SAF)  SAF es un seymour de condiciones que causan [address], de comportamiento y de crecimiento  · Consulte con griffin médico antes de jayce cualquier medicamento  Muchos medicamentos pueden perjudicar a griffin bebé si usted los kevin [address]  No tome ningún medicamento, vitaminas, hierbas o suplementos sin petar consultar con griffin médico    · Nunca  use drogas ilegales o de la [place] (cahs marihuana o cocaína) mientras está embarazada  Consejos de seguridad:   · Evite jacuzzis y saunas  No use un jacuzzi o un sauna mientras usted está embarazada, especialmente risa el primer trimestre  Los [facility] y los saunas aumentan la temperatura de griffin bebé y el riesgo de defectos de nacimiento  · Evite la toxoplasmosis  [place] es herbie infección causada por comer carne cruda o estar cerca del excremento de un tera infectado  [place] puede causar malformaciones congénitas, aborto espontáneo y Khoi Schein  Lávese las tima después de tocar carne cruda  Asegúrese de que la carne esté annika cocida antes de comerla  Evite los huevos crudos y la Nick Bogata  Use guantes o pida que alguien la ayude a limpiar la caja de arena del tera mientras usted Caron Christianson     · Consulte con griffin médico acerca de viajar  El [address] cómodo para viajar es risa el ryder trimestre  Pregunte a griffin médico si usted puede viajar después de las 36 semanas  Es posible que no pueda viajar en avión después de las [address]  También le puede recomendar que evite largos viajes por carretera  Programe un control con griffin médico u obstetra según lo indicado:  Vaya a todas rosa citas prenatales risa griffin embarazo  Anote rosa preguntas para que se acuerde de hacerlas risa rosa visitas  [place] y vómito en el embarazo       CUIDADO AMBULATORIO:   La náusea y el vómito en el embarazo  pueden suceder a cualquier hora del día  Estos síntomas usualmente comienzan antes de la semana 9 del embarazo y terminan para la semana 14 (ryder trimestre)  Algunas mujeres pueden tener náusea o vómito por un tiempo prolongado  Estos síntomas pueden afectar a algunas mujeres risa el embarazo  La náusea y el vómito no dañan a griffin bebé  Estos síntomas pueden dificultarle rosa actividades diarias  Pregúntele a griffin Prateek Fanti vitaminas y minerales son adecuados para usted  · Usted vomita más de 4 veces en 1 día  · Usted no ha podido retener líquidos en el estómago por más de 1 día  · Usted pierde más de 2 libras  · Usted tiene fiebre  · Rosa náuseas y vómito continúan por más de 14 semanas  · Usted tiene preguntas o inquietudes acerca de griffin condición o cuidado  El tratamiento  para la náusea y el vómito en el embarazo generalmente no es necesario  Usted puede [employer] alimentos que come y en rosa actividades para ayudar a controlar rosa síntomas  Es posible que usted necesite probar varias cosas para determinar qué funciona mejor para usted  Hable con griffin médico si rosa síntomas no mejoran con los cambios que se recomiendan a continuación  Es posible que usted necesite vitamina B6 y medicamento si estos cambios no ayudan o si rosa síntomas se vuelven graves     Cambios de nutrición que usted puede realizar para controlar la náusea y el vómito:   · Coma porciones pequeñas risa el día en vez de 3 comidas con porciones grandes  Es más probable que usted tenga náusea y vómito cuando griffin estómago está vacío  Consuma alimentos bajos en grasa y ricos en proteínas  Ejemplos son Fam Ambler, frijoles, pavo y qasim sin andreina Diehl, chas galletas saladas, cereal seco o un sandwich chico antes de [place]  · Coma galletas saladas o pan saran antes de levantarse de griffin cama por la mañana  Levántese de la cama lentamente  Los movimientos repentinos podrían provocarle mareos y [place]  · Consuma alimentos blandos cuando se sienta con náuseas  Ejemplos de alimentos blandos son el pan saran, cereal seco, pasta sin Felipa Hora y martinez  Otros alimentos blandos incluyen a las [employer], plátanos, gelatina y pretzels  Evite los alimentos condimentados, grasosos y fritos  Evite otros alimentos que le provoquen náuseas  · [place] líquidos que contengan gengibre  [place] refresco de gengibre hecho con gengibre real o té de gengibre hecho con gengibre fresco rallado  Las [employer] o dulces de gengibre también podrían ayudar a aliviar la náusea y el vómito  · [place] líquidos entre alimentos en vez de tomarlos con los alimentos  [place] al menos 30 minutos después de comer para jayce líquidos  [place] cantidades pequeñas de líquidos con frecuencia risa el día para evitar la deshidratación  Consulte cuál es la cantidad de líquido que usted debería consumir al día  Otros cambios que usted puede realizar para controlar la náusea y el vómito:   · Evite los olores que la [place]  Los olores stevie podrían provocar que [employer] náuseas y el vómito, o podrían empeorarlo  Camine un poco, prenda un ventilador o trate de dormir con la ventana abierta para respirar aire fresco  Cuando esté cocinando, scot las ventanas para eliminar el olor que podría provocarle náuseas    · No se cepille keenan dientes inmediatamente después de comer  si eso le provoca náuseas  · Descanse cuando lo necesite  Comience herbie actividad lentamente y vuelva a griffin rutina normal conforme se empiece a sentir mejor  · Hable con griffin médico acerca de las vitaminas prenatales  Las vitaminas prenatales pueden provocar náuseas a algunas mujeres  Trate de tomárselas por la noche o con un bocadillo  Si butch cambio no le [facility], griffin médico podría recomendarle un tipo de vitamina diferente  · No use ningún medicamento, vitamina o suplemento para controlar keenan síntomas sin antes consultarlo con griffin médico   Varios medicamentos pueden dañar a griffin bebé que no ha nacido  · El ejercicio de ligero a moderado  podría ayudar a aliviar keenan síntomas  También podría ayudarla a dormir mejor por la noche  Pregunte a griffin médico acerca del mejor plan de ejercicio para usted  Beneficios de la lactancia materna   son menos propensos a desarrollar alergias   Tienen herbie mayor protección contra la meningitis bacteriana   Tienen menos infecciones del oído medio   Tienen menos dificultades de aprendizaje    Tienen menos dificultades de comportamiento   Tienen un coeficiente intelectual más alto   Tienen tasas más bajas de enfermedades respiratorias   Tienen oscar obesidad    Son menos propensos a desarrollar diabetes juvenil y algunos cánceres infantiles   Tienen menos probabilidades de morir por Síndrome de ertManatee Memorial Hospital   Un bebé más saludable significa menos visitas al médico y a la farmacia   La lactancia materna es ecológica  No hay nada que tirar   La lactancia materna es gratis; La fórmula puede costar más de $ 1000 risa el primer año de lakesha   Hay menos sangrado vaginal inmediatamente después del parto y un retorno más rápido a griffin tamaño anterior al [employer]  Las [place] que amamantan risa un total de 2 años tienen  ;  Herbie disminución del 40% en el riesgo de cáncer de seno  ; Disminución del riesgo de cáncer de ovario   ; Tasas más bajas de osteoporosis, diabetes y enfermedades del corazón  Importancia de la lactancia materna exclusiva   Al proporcionar el alimento ideal para el crecimiento y desarrollo saludable de los bebés, solo se les alimenta con [place], esto es amamantamiento exclusivo   La lactancia materna [facility] primeros 6 meses es muy importante para mejorar la kyle de un bebé y reducir las enfermedades infantiles  Lactancia materna exclusiva risa los primeros 6 meses  [facility] de Pediatría recomienda la lactancia materna exclusiva risa los primeros seis meses y la lactancia materna continua con suplementos de sólidos risa los próximos 6 meses  Inicio temprano de la lactancia materna   Las mujeres que alimentan a keenan bebés dentro de la primera hora de nacimiento se conocen chas inicio temprano de la lactancia materna y aseguran que el recién nacido reciba calostro   El calostro es rico en anticuerpos y nutrientes esenciales  Compartiendo la habitación   Puede estabilizar la respiración y la frecuencia cardíaca del recién nacido   Reduce el llanto   Mejorar la capacidad del recién nacido para mantener la temperatura y los niveles de azúcar en la remy   Estimulación temprana del sistema inmunitario del bebé   efectos calmantes   Enlace más fácil y rápido   El compartir la habitación promueve conocer a griffin recién nacido   El alojamiento conjunto facilita la lactancia materna   Las mujeres que se alojan con keenan recién nacidos producen [place] y producen un buen suministro de [place]   Se hace más fácil reconocer las señales de alimentación del bebé   Los bebés tienen sesiones de lactancia más largas   Las mujeres que comparten habitación con keenan recién nacidos tienen más probabilidades de amamantar exclusivamente en comparación con las mujeres que están separadas de keenan recién nacidos    Piel con piel   El contacto piel con piel debe ocurrir independientemente de la preferencia de alimentación de herbie jitendra o el modo de [place]   Después del parto de griffin bebé, es importante que herbie madre esther y griffin bebé tengan un contacto ininterrumpido de piel a piel   El contacto piel con piel debe ocurrir inmediatamente después del nacimiento risa al menos herbie o dos horas y [place] después de la primera alimentación para las madres que [place]   El contacto piel con piel significa colocar recién nacidos secos y sin ropa sobre el pecho desnudo de griffin [place], con mantas calientes cubriendo la espalda del bebé   Todos los procedimientos de rutina, chas las evaluaciones de [place] y recién nacidos, pueden realizarse risa el contacto piel con piel o pueden retrasarse hasta después del período sensible inmediatamente después del nacimiento   Estamos orgullosos de ofrecer amplios servicios educativos y de apoyo para alentar a las madres a amamantar   Soniya servicio ofrece información, educación y [de-identified] para mujeres que yosi amamantar  Las [place] pueden dejar un mensaje o herbie pregunta sobre la lactancia en línea en cualquier momento del día  Las enfermeras responderán dentro de las 72 horas   La línea de respuesta de lactancia materna es [phone] ([phone])  NO deje mensajes urgentes o emergentes en esta línea de mensaje  Comuníquese con griffin médico Nic Sas si tiene alguna pregunta o inquietud urgente   En muna de sospecha de herbie afección médica de emergencia, [facility]      Attaching your baby at the Breast (English): [email or web address] [email or web address]-items/attaching-your-baby-[email or web address]-the-breast/?[email or web address]    Attaching your baby at the Breast (Yakut):  [email or web address] Televisionmedia [email or web address]-items/[email or web address]      Coronavirus (COVID-19) pregnancy FAQs: Medical experts answer your questions  From [employer] com cy  [id number]  bc (courtesy of [facility])  As of 3/18/20  By Dontae John 40  Medically reviewed by Society for Maternal-Fetal Medicine       We asked parents-to-be to send us their most pressing questions about coronavirus (COVID-19)  Among them: Is it still safe to deliver in a hospital? What if my ob-gyn has the virus? We sent those questions to the top docs at the Society for Maternal-Fetal Medicine  Here are their answers  The coronavirus (COVID-19) pandemic has been declared a national emergency in the [facility] by [employer]  Moms-to-be like you are concerned about everything from getting medicines to managing disruptions at work  But above and beyond any worry about lifestyle changes is a focus on your health and the impact of COVID-19 on your pregnancy  We asked obstetrics doctors who handle the most complicated pregnancy issues to answer your questions about the coronavirus  Here are their responses, provided by Dr Genny Bauman and her colleagues at the Society for [place] [facility]  Am I at more risk for COVID-19 if I'm pregnant? We don't know  Pregnancy does change your immune system in ways that might make you more susceptible to viral respiratory infections like COVID-19  If you become infected, you might also be at higher risk for more severe illness compared to the general population  Although this does not appear to be the case with COVID-19, based on limited data so far, a higher risk has been true for pregnant women with other coronavirus infections (SARS-CoV and MERS-CoV) and other viral respiratory infections, such as flu  It's important to take preventive actions to avoid infection, such as washing your hands often and avoiding people who are sick  How might coronavirus affect my pregnancy? Again, we don't know   Women with other coronavirus infections (such as SARS-CoV) did not have miscarriage or stillbirth at higher rates than the general population  We know that having other respiratory viral infections during pregnancy, such as flu, has been associated with problems like low birth weight and  birth  Also, having a high fever early in pregnancy may increase the risk of certain birth defects  Could I transmit coronavirus to my baby during pregnancy or delivery? We don't know whether you could transmit COVID-19 to your baby before or during delivery  However, among the few case studies of infants born to mothers with COVID-23 published in peer-reviewed literature, none of the infants tested positive for the virus  Also, there was no virus detected in samples of amniotic fluid or breast milk  There have been a few reports of newborns as young as a few days old with infection, suggesting that a mother can transmit the infection to her infant through close contact after delivery  There have been no reports of mother-to-baby transmission for other coronaviruses (MERS-CoV and SARS-CoV), although only limited information is available  Is it safe for me to deliver at a hospital where there have been COVID-19 cases? Yes  We know that COVID-19 is a very scary virus  The good news is that hospitals are taking great precautions to keep patients and healthcare providers safe  When a patient is even suspected to have COVID-19, they are placed in a negative pressure room  (Think of these rooms as vacuums that suck and filter the air so it's safe for the other people in the hospital)  This makes it possible for you to deliver at the hospital without putting you or your baby at risk  Hospitals are also implementing stricter visiting policies to keep patients safe  It's worth calling your hospital to check if there are any new regulations to be aware of      What plans should I make now in case the hospital system is overwhelmed when it's time for me to deliver? This is a great question to talk with your doctor or midwife about when you're 34 to 36 weeks pregnant  Every hospital is making different plans for dealing with this scenario  I work in healthcare  Should I ask my doctor to excuse me from work until the baby is born? What if I work in a school, the travel [place], or some other high-risk setting? Healthcare facilities should take care to limit the exposure of pregnant employees to patients with confirmed or suspected COVID-19, just as they would with other infectious cases  If you continue working, be sure to follow the CDC's risk assessment and infection control guidelines  If you work in a school, travel [place], or other high-risk setting, talk with your employer about what it's doing to protect employees and minimize infection risks  Wash your hands often  What if my OB gets COVID-19? If your doctor or midwife tests positive for COVID-19, they will need to be quarantined until they recover and are no longer at risk of transmitting the virus  In this case, you'll be assigned to another OB in your doctor's practice (or you may choose another practitioner yourself)  Ask your new OB or your doctor's office if you should self-quarantine or be tested for the virus  (It will depend on when you last saw your provider and when that person tested positive )    Should we hold off on trying to conceive because of COVID-19? At this time, there's no reason to hold off on trying to get pregnant, but the data we have is really limited  For example, we don't think the virus causes birth defects or increases your risk of miscarriage  But we don't know whether you could transmit COVID-19 to your baby before or during delivery  We also don't know if the virus lives in semen or can be sexually transmitted  We have a babymoon scheduled in the next few months - should we cancel?   If you're planning to travel internationally or on a cruise, you should strongly consider canceling  At this time, the virus has reached more than 140 countries, and there are travel bans to [place], most of [place], and [place]  Places where large numbers of people gather are at highest risk, especially airports and cruise ships  If you're planning travel in the [place] , note that any travel setting increases your risk of exposure, and there may be travel bans even in [place] by the time you're scheduled to go  Even if you're state is not blocked, you'll definitely want to avoid traveling to communities where the virus is spreading  To find out where these places are, check The [place] Times map based on CDC data  For the most current advice to help you avoid exposure, check the CDC's COVID-19 travel page  Will the hospital separate me from my  and keep the baby in quarantine? If you test positive for COVID-19 or have been exposed but have no symptoms, the CDC, [employer] of Obstetricians and Gynecologists, and the Society for [place] all recommend that you be  from your baby to decrease the risk of transmission to the baby  This would last until you are no longer at risk of transmitting the virus  If you do not have COVID-19 and have not been exposed to the virus, the hospital will not separate you from your   My hospital is restricting visitors and only allowing one support person  If my support person leaves after the delivery, will they be allowed to come back? Every hospital has different policies  Contact your hospital or labor and delivery unit a week or so before delivery to get the most up-to-date restrictions  In general, if your support person needs to leave, they would be allowed back unless they knew they were exposed to COVID-19 after leaving your company  [facility] understands that the coronavirus (COVID-19) pandemic is an evolving story and that your questions will change over time   We'll continue asking moms and dads in our Community what they want to know, and we'll get the answers from experts to keep them - and you - informed and supported  My mom was planning to fly here to help me care for my new baby after delivery  Should I tell her not to come? Yes  If your mom is over 61 or has any serious chronic medical conditions (such as heart disease, lung disease, or diabetes), she is at higher risk of serious illness from COVID-19 and should avoid air travel  And remember that any travel setting increases a person's risk of exposure  So, it may be risky to have her around the baby after she has been traveling  For the most current advice on traveling, check the [facility][note 3]'s COVID-19 travel page  [facility] understands that the coronavirus pandemic is an evolving story and that your questions will change over time  We'll continue asking moms and dads in our Community what they want to know, and we'll get the answers from experts to keep them - and you - informed and supported  El embarazo de la semana 23 a la 26   CUIDADO AMBULATORIO:   Qué cambios están ocurriendo en griffin cuerpo: Ahora usted está cerca o al principio del tercer trimestre  El tercer trimestre comienza a las 24 semanas y concluye al momento del [place]  Conforme griffin bebé crece más, usted podría desarrollar ciertos síntomas  Estos podrían incluir dolor en griffin espalda o en la parte inferior de los costados de griffin abdomen  Es posible que también se le formen estrías en griffin abdomen, senos, muslos o glúteos  Usted también podría presentar estreñimiento  Busque atención médica de inmediato si:  · Usted presenta un chapo dolor de arina que no desaparece  · Usted tiene cambios en la visión nuevos o en aumento, chas visión borrosa o con manchas  · Usted tiene inflamación nueva o creciente en griffin yuki o tima      · Usted tiene manchado o sangrado vaginal     · Usted rompe eleanor o siente un chorro o gotas de agua tibia que le está bajando por griffin vagina  Comuníquese con griffin médico si:  · Usted tiene calambres, presión o tensión abdominal     · Usted tiene un cambio en la secreción vaginal     · Usted tiene un sangrado leve  · Usted tiene escalofríos o fiebre  · Usted tiene comezón, ardor o dolor vaginal     · Usted tiene herbie secreción vaginal amarillenta, verdosa, parag o de [employer]  · Usted tiene dolor o ardor al [place], orina menos de lo habitual o tiene [place] rosada o sanguinolenta  · Usted tiene preguntas o inquietudes acerca de griffin condición o cuidado  Cómo cuidarse en esta etapa de griffin embarazo:  · Consuma alimentos saludables y variados  Alimentos saludables incluyen frutas, verduras, panes de francisco integral, alimentos lácteos bajos en grasa, frijoles, alec magras y pescado  [employer] líquidos chas se le haya indicado  Pregunte cuánto líquido debe jayce cada día y cuáles líquidos son los más adecuados para usted  Limite el consumo de cafeína a menos de [place] [address]  Limite el consumo de pescado a 2 porciones cada semana  Escoja pescado con concentraciones bajas de melyssa chas atún al natural enlatado, camarón, salmón, bacalao o tilapia  No coma pescado con concentraciones altas de melyssa chas pez miki, caballa gigante, pargo rayado y tiburón  · Controle griffin dolor de espalda  No esté de pie por largos periodos de tiempo ni levante objetos pesados  Use herbie buena postura mientras esté de pie, se agache o se doble  Use zapatos de tacón bajo con un buen soporte  Descansar puede también ayudarla a aliviar el dolor de espalda  Pregunte a grififn médico acerca de ejercicios que usted pueda hacer para fortalecer los músculos de griffin espalda  · [address] [place]  Griffin necesidad de ciertas vitaminas y [facility], chas el ácido fólico, aumenta risa el [facility]  Las vitaminas prenatales proporcionan algunas de las vitaminas y minerales adicionales que alana necesita   Deyvi Dyson también podrían ayudar a disminuir el riesgo de ciertos defectos de nacimiento  · Consulte con griffin médico acerca de hacer ejercicio  El ejercicio moderado puede ayudarla a mantenerse en forma  Griffin médico la ayudará a planear un programa de ejercicios que sea seguro para usted risa [facility]iffin [facility]  · No fume  Fumar aumenta el riesgo de aborto espontáneo y otros problemas de kyle risa griffin [place]  Fumar puede causar que griffin bebé nazca antes de tiempo o que pese menos al nacer  Solicite información a griffin médico si usted necesita ayuda para dejar de fumar  · No consuma alcohol  El alcohol pasa de griffin cuerpo al bebé a través de la placenta  Puede afectar el desarrollo del cerebro de griffin bebé y provocar el síndrome de alcoholismo fetal (SAF)  El SAF es un seymour de condiciones que causan problemas [place], de comportamiento y de crecimiento  · Consulte con griffin médico antes de jayce cualquier medicamento  Muchos medicamentos pueden perjudicar a griffin bebé si usted los kevin [address]  No tome ningún medicamento, vitaminas, hierbas o suplementos sin petar consultar con griffin [place]  use drogas ilegales o de la [place] (chas marihuana o cocaína) mientras está embarazada  Consejos de seguridad risa el embarazo:  · Evite jacuzzis y saunas  No use un jacuzzi o un sauna mientras usted está embarazada, especialmente risa el primer trimestre  Los [facility] y los saunas aumentan la temperatura de griffin bebé y el riesgo de defectos de nacimiento  · Evite la toxoplasmosis  [place] es herbie infección causada por comer carne cruda o estar cerca del excremento de un tera infectado  [place] puede causar malformaciones congénitas, aborto espontáneo y [place]  Lávese las tima después de tocar carne cruda  Asegúrese de que la carne esté nanika cocida antes de comerla  Evite los huevos crudos y la [place]   Use guantes o pida que alguien la ayude a limpiar la caja de arena del [facility] usted [facility]  Cambios que están ocurriendo con griffin bebé: Para las 26 semanas, griffin bebé pesará alrededor de 2 libras  Griffin bebé medirá alrededor de 10 pulgadas de gasper desde el raysa de la arina hasta la rabadilla (parte inferior del bebé)  Los movimientos de griffin bebé son mucho más stevie en esta etapa  Los ojos de griffin bebé remy están completamente formados y pueden abrirse parcialmente  Griffin bebé también duerme y se despierta  Lo que necesita saber acerca del cuidado prenatal: Griffin médico le revisará griffin presión arterial y [place]  Es posible que también necesite lo siguiente:  · Un examen de orina también podría realizarse para revisarle el azúcar y la proteína  Estas son señales de diabetes gestacional o de infección  La proteína en griffin orina también podría ser herbie señal de preeclampsia  La preeclampsia es herbie condición que puede desarrollarse risa la semana 21 o después en griffin embarazo  Esta provoca presión arterial joey y [employer] con keenan riñones y [place]  · La altura uterina es herbie medición del útero para controlar el desarrollo de griffin bebé  [employer] por lo general es igual al número de [facility] que usted tiene de embarazo  · El ritmo cardíaco de griffin bebé será revisado  © Copyright [address] Information is for End User's use only and may not be sold, redistributed or otherwise used for commercial purposes  All illustrations and images included in CareNotes® are the copyrighted property of A D A M , [place]  or [address] [place] [address] es sólo para uso en educación  Griffin intención no es darle un consejo médico sobre enfermedades o tratamientos  Colsulte con griffin Bunny Keas farmacéutico antes de seguir cualquier régimen médico para saber si es seguro y efectivo para usted

## 2021-05-20 ENCOUNTER — TELEPHONE (OUTPATIENT)
Dept: OBGYN CLINIC | Facility: CLINIC | Age: 36
End: 2021-05-20

## 2021-05-20 ENCOUNTER — TELEPHONE (OUTPATIENT)
Dept: PERINATAL CARE | Facility: CLINIC | Age: 36
End: 2021-05-20

## 2021-05-20 NOTE — TELEPHONE ENCOUNTER
----- Message from Carisa Vázquez MD sent at 5/15/2021 10:06 AM EDT -----  Pap and cotesting negative- repeat pap and cotesting due in 5 years per asccp guidelines

## 2021-05-20 NOTE — TELEPHONE ENCOUNTER
Patient called to schedule an ultrasound  It was very difficult to understand her, but I scheduled her for 6/2 @ 2:30pm , [facility] office  I also emailed her with appointment details

## 2021-06-02 ENCOUNTER — ROUTINE PRENATAL (OUTPATIENT)
Dept: PERINATAL CARE | Facility: CLINIC | Age: 36
End: 2021-06-02
Payer: COMMERCIAL

## 2021-06-02 VITALS
BODY MASS INDEX: 32.83 KG/M2 | HEART RATE: 80 BPM | HEIGHT: 60 IN | SYSTOLIC BLOOD PRESSURE: 105 MMHG | WEIGHT: 167.2 LBS | DIASTOLIC BLOOD PRESSURE: 54 MMHG

## 2021-06-02 DIAGNOSIS — Z3A.27 27 WEEKS GESTATION OF PREGNANCY: ICD-10-CM

## 2021-06-02 DIAGNOSIS — O34.219 HISTORY OF CESAREAN DELIVERY, ANTEPARTUM: ICD-10-CM

## 2021-06-02 DIAGNOSIS — Z36.3 ENCOUNTER FOR ANTENATAL SCREENING FOR MALFORMATIONS: Primary | ICD-10-CM

## 2021-06-02 PROCEDURE — 99242 OFF/OP CONSLTJ NEW/EST SF 20: CPT | Performed by: OBSTETRICS & GYNECOLOGY

## 2021-06-02 PROCEDURE — 76805 OB US >/= 14 WKS SNGL FETUS: CPT | Performed by: OBSTETRICS & GYNECOLOGY

## 2021-06-02 NOTE — PROGRESS NOTES
[address]: Ms Angela Ordoñez was seen today at 27w4d for anatomic survey ultrasound  See ultrasound report under "OB Procedures" tab  Please don't hesitate to contact our office with any concerns or questions    Markie Sal MD

## 2021-06-02 NOTE — LETTER
June 2, 2021     Desmond Guzman MD  [address] [place] [address]    Patient: Sarahy Quevedo   YOB: 1985   Date of Visit: 6/2/2021       Dear Dr Negin Sewell: Thank you for referring Sarahy Quevedo to me for evaluation  Below are my notes for this consultation  If you have questions, please do not hesitate to call me  I look forward to following your patient along with you  Sincerely,        Sujata Oro MD        CC: No Recipients  Sujata Oro MD  6/2/2021  4:45 PM  Jumana Garduno [facility]: Ms Estrella Wu was seen today at 27w4d for anatomic survey ultrasound  See ultrasound report under "OB Procedures" tab  Please don't hesitate to contact our office with any concerns or questions    Sujata Oro MD

## 2021-06-02 NOTE — PATIENT INSTRUCTIONS
Thank you for choosing us for your  care today  If you have any questions about your ultrasound or care, please do not hesitate to contact us or your primary obstetrician  Some general instructions for your pregnancy are:     Protect against coronavirus: Continue to practice social distancing, wear a mask, and wash your hands often  Pregnant women are increased risk of severe COVID  Notify your primary care doctor if you have any symptoms including cough, shortness of breath or difficulty breathing, fever, chills, muscle pain, sore throat, or loss of taste or smell  Pregnant women can receive the coronavirus vaccine   Exercise: Aim for 22 minutes per day (150 minutes per week) of regular exercise  Walking is great!  Nutrition: aim for calcium-rich and iron-rich foods as well as healthy sources of protein   Protect against the flu: get yourself and your entire household vaccinated against influenza  This will protect your baby   Learn about Preeclampsia: preeclampsia is a common, serious high blood pressure complication in pregnancy  A blood pressure of 464AACZ (systolic or top number) or [id number] (diastolic or bottom number) is not normal and needs evaluation by your doctor   If you smoke, try to reduce how many cigarettes you smoke or try to quit completely  Do not vape   Other warning signs to watch out for in pregnancy or postpartum: chest pain, obstructed breathing or shortness of breath, seizures, thoughts of hurting yourself or your baby, bleeding, a painful or swollen leg, fever, or headache (see AWHONN POST-BIRTH Warning Signs campaign)  If these happen call 911  Itching is also not normal in pregnancy and if you experience this, especially over your hands and feet, potentially worse at night, notify your doctors     Lastly, if you are contacted regarding participation in a survey about your experience in our office, please know that we take any feedback you provide seriously and use it to improve how we deliver care through our center

## 2021-06-07 ENCOUNTER — ROUTINE PRENATAL (OUTPATIENT)
Dept: OBGYN CLINIC | Facility: CLINIC | Age: 36
End: 2021-06-07

## 2021-06-07 VITALS
WEIGHT: 168 LBS | DIASTOLIC BLOOD PRESSURE: 59 MMHG | BODY MASS INDEX: 32.98 KG/M2 | SYSTOLIC BLOOD PRESSURE: 116 MMHG | HEIGHT: 60 IN | HEART RATE: 82 BPM

## 2021-06-07 DIAGNOSIS — Z98.891 HISTORY OF CESAREAN DELIVERY: ICD-10-CM

## 2021-06-07 DIAGNOSIS — Z34.92 PRENATAL CARE IN SECOND TRIMESTER: ICD-10-CM

## 2021-06-07 DIAGNOSIS — Z3A.28 28 WEEKS GESTATION OF PREGNANCY: ICD-10-CM

## 2021-06-07 DIAGNOSIS — O98.512 COVID-19 AFFECTING PREGNANCY IN SECOND TRIMESTER: ICD-10-CM

## 2021-06-07 DIAGNOSIS — U07.1 COVID-19 AFFECTING PREGNANCY IN SECOND TRIMESTER: ICD-10-CM

## 2021-06-07 DIAGNOSIS — Z23 NEED FOR TDAP VACCINATION: Primary | ICD-10-CM

## 2021-06-07 PROCEDURE — 99213 OFFICE O/P EST LOW 20 MIN: CPT | Performed by: OBSTETRICS & GYNECOLOGY

## 2021-06-07 PROCEDURE — 90471 IMMUNIZATION ADMIN: CPT

## 2021-06-07 PROCEDURE — 90715 TDAP VACCINE 7 YRS/> IM: CPT

## 2021-06-07 NOTE — PROGRESS NOTES
28 week education packet provided to patient on 06/07/21  Included in packet:   Third Trimester paperwork  Delivery consent   Birthing room support person rules and acknowledgment  Birth Plan   Welcome information  Birth certificate worksheet   Consent for Photographers  Perineal/ Vaginal massage   Pediatric practices and locations

## 2021-06-07 NOTE — PATIENT INSTRUCTIONS
El embarazo de la semana 27 a la 30   CUIDADO AMBULATORIO:   Qué cambios están ocurriendo en griffin cuerpo: Usted podría notar síntomas nuevos chas falta de [place] o inflamación de keenan tobillos y pies  Es posible que también tenga dificultad para dormir o contracciones  Busque atención médica de inmediato si:  · Usted presenta un chapo dolor de arina que no desaparece  · Usted tiene cambios en la visión nuevos o en aumento, chas visión borrosa o con manchas  · Usted tiene inflamación nueva o creciente en griffin yuki o tima  · Usted tiene manchado o sangrado vaginal     · Usted rompe eleanor o siente un chorro o gotas de agua tibia que le está bajando por griffin vagina  Comuníquese con griffin médico si:  · Usted tiene más de 5 contracciones en 1 hora  · Usted nota algún cambio en los movimientos de griffin bebé  · Usted tiene calambres, presión o tensión abdominal     · Usted tiene un cambio en la secreción vaginal     · Usted tiene escalofríos o fiebre  · Usted tiene comezón, ardor o dolor vaginal     · Usted tiene herbie secreción vaginal amarillenta, verdosa, parag o de [employer]  · Usted tiene dolor o ardor al [place], orina menos de lo habitual o tiene [place] rosada o sanguinolenta  · Usted tiene preguntas o inquietudes acerca de griffin condición o cuidado  Cómo cuidarse en esta etapa de griffin embarazo:  · Consuma alimentos saludables y variados  Alimentos saludables incluyen frutas, verduras, panes de francisco integral, alimentos lácteos bajos en grasa, frijoles, alec magras y pescado  [place] líquidos chas se le haya indicado  Pregunte cuánto líquido debe jayce cada día y cuáles líquidos son los más adecuados para usted  Limite el consumo de cafeína a menos de [place] [address]  Limite el consumo de pescado a 2 porciones cada semana  Escoja pescado con concentraciones bajas de melyssa chas atún al natural enlatado, camarón, salmón, bacalao o tilapia   No coma pescado con concentraciones altas de melyssa chas pez miki, caballa gigante, pargo rayado y tiburón  · Controle la acidez comiendo 4 o 5 comidas pequeñas cada día en vez de comidas grandes  Evite los alimentos picantes  · Controle la inflamación al [place] y poner los pies en alto o elevados sobre [place]  · [id number] [place]  Griffin necesidad de ciertas vitaminas y [facility], chas el ácido fólico, aumenta risa el [facility]  Las vitaminas prenatales proporcionan algunas de las vitaminas y minerales adicionales que usted necesita  Las vitaminas prenatales también podrían ayudar a disminuir el riesgo de ciertos defectos de nacimiento  · Consulte con griffin médico acerca de hacer ejercicio  El ejercicio moderado puede ayudarla a mantenerse en forma  Griffin médico la ayudará a planear un programa de ejercicios que sea seguro para usted risa griffin [facility]  · No fume  Fumar aumenta el riesgo de aborto espontáneo y otros problemas de kyle risa griffin [facility]  Fumar puede causar que griffin bebé nazca antes de tiempo o que pese menos al nacer  Solicite información a griffin médico si usted necesita ayuda para dejar de fumar  · No consuma alcohol  El alcohol pasa de griffin cuerpo al bebé a través de la placenta  Puede afectar el desarrollo del cerebro de griffin bebé y provocar el síndrome de alcoholismo fetal (SAF)  El SAF es un seymour de condiciones que causan problemas [place], de comportamiento y de crecimiento  · Consulte con griffin médico antes de jayce cualquier medicamento  Muchos medicamentos pueden perjudicar a griffin bebé si usted los kevin [address] [place] [address]  No tome ningún medicamento, vitaminas, hierbas o suplementos sin petar consultar con griffin [place] Nurse  use drogas ilegales o de la [place] (chas marihuana o cocaína) mientras está embarazada  Consejos de seguridad risa el embarazo:  · Evite jacuzzis y saunas   No use un jacuzzi o un sauna mientras usted está [place], especialmente risa el primer trimestre  Los [facility] y los saunas aumentan la temperatura de de león bebé y el riesgo de defectos de nacimiento  · Evite la toxoplasmosis  [place] es heribe infección causada por comer carne cruda o estar cerca del excremento de un tera infectado  [place] puede causar malformaciones congénitas, aborto espontáneo y Khoi Schein  Lávese las tima después de tocar carne cruda  Asegúrese de que la carne esté annika cocida antes de comerla  Evite los huevos crudos y la [place]  Use guantes o pida que alguien la ayude a limpiar la caja de arena del tera mientras usted [place]  Cambios que están ocurriendo con de león bebé: Para las 30 semanas, de león bebé podría pesar más de 3 libras  De León bebé podría medir alrededor de 11 pulgadas de gasper desde la punta de la arina hasta la rabadilla (parte inferior del bebé)  Ahora de león bebé puede abrir y cerrar keenan ojos  Las patadas y movimientos de de león bebé son más stevie en butch momento  Lo que necesita saber acerca del cuidado prenatal: De León médico le revisará de león presión arterial y [place]  Es posible que también necesite lo siguiente:  · Los análisis de remy podrían realizarse para revisar signos de anemia o el tipo de [place]  · Un examen de orina también podría realizarse para revisarle el azúcar y la proteína  Estas son señales de diabetes gestacional o de infección  La proteína en de león orina también podría ser herbie señal de preeclampsia  La preeclampsia es herbie condición que puede desarrollarse risa la semana 21 o después en de león embarazo  Esta provoca presión arterial joey y [employer] con keenan riñones y [place]  · Herbie vacuna contra difteria, tétanos y tos ferina y vacuna contra la gripe podría ser recomendado por de león médico     · La detección de diabetes gestacional se realizará usando herbie prueba oral de tolerancia a la glucosa   Herbie prueba oral de tolerancia a la glucosa comienza con herbie revisión de los niveles de azúcar en la [place] después de que usted no haya comido por 8 horas  Después se le da herbie bebida de glucosa  Le revisan el nivel de azúcar en la remy después de 1 hora, 2 horas y algunas veces 3 horas  Los médicos analizarán si el nivel de azúcar en la remy aumenta después del primer análisis  · La altura uterina es herbie medición del útero para controlar el desarrollo de griffin bebé  [employer] por lo general es igual al número de [facility] que usted tiene de embarazo  Griffin médico también podría revisar la posición de griffin bebé  · El ritmo cardíaco de griffin bebé será revisado  © [employer] Information is for End User's use only and may not be sold, redistributed or otherwise used for commercial purposes  All illustrations and images included in CareNotes® are the copyrighted property of [employer]  or [address] [place] [address] es sólo para uso en educación  Griffin intención no es darle un consejo médico sobre enfermedades o tratamientos  Colsulte con griffin Hardeman Anand farmacéutico antes de seguir cualquier régimen médico para saber si es seguro y efectivo para usted  Ligadura de trompas   LO QUE NECESITA SABER:   ¿Qué necesito saber sobre la ligadura de trompas? La ligadura de trompas es herbie cirugía que se hace para cerrar keenan trompas de falopio y así prevenir el embarazo  ¿Cómo me preparo para la ligadura de trompas? Griffin médico le explicará cómo debe prepararse para la [facility]  Seguramente le indicará que no coma ni tome nada 8 horas antes de Yazmin Galaviz qué medicamentos puede jayce el día de la hospitals  Programe que alguien lo lleve a griffin casa y permanezca con usted después de la [facility]  ¿Qué pasará risa la ligadura de trompas? · Pueden administrarle anestesia general para mantenerlo dormido y [place] de dolor risa la hospitals  Es posible que en cambio le apliquen anestesia regional o local  La anestesia regional duerme la parte inferior de griffin cuerpo   La anestesia local le duerme el área de la [facility]  Con la anestesia local, aún podría sentir presión o molestia risa la cirugía, onel no debería sentir ningún dolor  Usted podría someterse a herbie minilaparotomía o herbie ligadura de trompas por laparoscopía  Risa la minilaparotomía, griffin cirujano hará herbie pequeña incisión en la parte inferior de griffin abdomen  · Risa la ligadura de trompas por laparoscopia griffin cirujano le hará herbie o más incisiones pequeñas en griffin abdomen  Por medio de las incisiones colocará el laparoscopio y otros instrumentos de manera que lleguen a griffin abdomen  El laparoscopio es un tubo de metal gasper con Lorelle Lease sienna y Lizzy Pester en el extremo  Llenarán griffin abdomen con un gas  [place] permite que griffin cirujano cain dentro de griffin abdomen con más claridad  Luego griffin cirujano procederá a cortar keenan trompas de falopio y las cerrará con hilo  Es probable que griffin médico opte por cerrar keenan trompas usando calor, un anillo o prensa  Jenetta Floss que termine con la cirugía, se procederá a cerrarle las incisiones con puntos de sutura o grapas  Es posible que [place] las incisiones con un vendaje  ¿Qué pasará después de la ligadura de trompas? Usted tendrá dolor y calambres abdominales risa los primeros tammie después de la [facility]  Es probable que se sienta mareado, con náuseas, inflamado o tenga gases  También podría sentir dolor en los hombros o cerca de keenan costillas si es que le pusieron gas en el [place]M  ¿Cuáles son los riesgos de la ligadura de trompas? Usted podría sangrar más de lo esperado o contraer herbie infección  Los vasos sanguíneos u [employer] intestinos o vejiga podrían salir dañados risa la [place]  A pesar de que el embarazo es prácticamente improbable después de herbie ligadura de trompas, siempre existe herbie pequeña posibilidad de que alana pueda quedar Roberta Jack  Si usted [place] existe un riesgo elevado de sufrir de un embarazo ectópico (embarazo en las trompas)   Se le puede formar un coágulo sanguíneo en la pierna o el Deborra Sauger  [place] podría poner en peligro griffin [place]  ACUERDOS SOBRE GRIFFIN CUIDADO:   Usted tiene el derecho de ayudar a planear griffin cuidado  Aprenda todo lo que pueda sobre griffin condición y chas darle tratamiento  Discuta keenan opciones de tratamiento con keenan médicos para decidir el cuidado que usted desea recibir  Usted siempre tiene el derecho de rechazar el tratamiento  Esta información es sólo para uso en educación  Griffin intención no es darle un consejo médico sobre enfermedades o tratamientos  Colsulte con griffin Bunny Keas farmacéutico antes de seguir cualquier régimen médico para saber si es seguro y efectivo para usted  © Copyright [address] Information is for End User's use only and may not be sold, redistributed or otherwise used for commercial purposes  All illustrations and images included in CareNotes® are the copyrighted property of A D A M , Inc  or [employer]  (IQBKA-01) pregnancy FAQs: Medical experts answer your questions  From [employer] com cy  [email or web address]_coronavirus-covid-19-pregnancy-faq-medical-gipbset-[id number]-[email or web address]_[id number]  bc (courtesy of [facility])  As of 3/18/20  By Dontae Smalls 40  Medically reviewed by Society for Maternal-Fetal Medicine       We asked parents-to-be to send us their most pressing questions about coronavirus (COVID-19)  Among them: Is it still safe to deliver in a hospital? What if my ob-gyn has the virus? We sent those questions to the top docs at the Society for Maternal-Fetal Medicine  Here are their answers  The coronavirus (COVID-19) pandemic has been declared a national emergency in the [facility] by [employer]  Moms-to-be like you are concerned about everything from getting medicines to managing disruptions at work  But above and beyond any worry about lifestyle changes is a focus on your health and the impact of COVID-19 on your pregnancy      We asked obstetrics doctors who handle the most complicated pregnancy issues to answer your questions about the coronavirus  Here are their responses, provided by Dr Emmie San and her colleagues at the Society for [place] [address]  Am I at more risk for COVID-19 if I'm pregnant? We don't know  Pregnancy does change your immune system in ways that might make you more susceptible to viral respiratory infections like COVID-19  If you become infected, you might also be at higher risk for more severe illness compared to the general population  Although this does not appear to be the case with COVID-19, based on limited data so far, a higher risk has been true for pregnant women with other coronavirus infections (SARS-CoV and MERS-CoV) and other viral respiratory infections, such as flu  It's important to take preventive actions to avoid infection, such as washing your hands often and avoiding people who are sick  How might coronavirus affect my pregnancy? Again, we don't know  Women with other coronavirus infections (such as SARS-CoV) did not have miscarriage or stillbirth at higher rates than the general population  We know that having other respiratory viral infections during pregnancy, such as flu, has been associated with problems like low birth weight and  birth  Also, having a high fever early in pregnancy may increase the risk of certain birth defects  Could I transmit coronavirus to my baby during pregnancy or delivery? We don't know whether you could transmit COVID-19 to your baby before or during delivery  However, among the few case studies of infants born to mothers with COVID-23 published in peer-reviewed literature, none of the infants tested positive for the virus  Also, there was no virus detected in samples of amniotic fluid or breast milk    There have been a few reports of newborns as young as a few days old with infection, suggesting that a mother can transmit the infection to her infant through close contact after delivery  There have been no reports of mother-to-baby transmission for other coronaviruses (MERS-CoV and SARS-CoV), although only limited information is available  Is it safe for me to deliver at a hospital where there have been COVID-19 cases? Yes  We know that COVID-19 is a very scary virus  The good news is that hospitals are taking great precautions to keep patients and healthcare providers safe  When a patient is even suspected to have COVID-19, they are placed in a negative pressure room  (Think of these rooms as vacuums that suck and filter the air so it's safe for the other people in the hospital)  This makes it possible for you to deliver at the hospital without putting you or your baby at risk  Hospitals are also implementing stricter visiting policies to keep patients safe  It's worth calling your hospital to check if there are any new regulations to be aware of  What plans should I make now in case the hospital system is overwhelmed when it's time for me to deliver? This is a great question to talk with your doctor or midwife about when you're 34 to 36 weeks pregnant  Every hospital is making different plans for dealing with this scenario  I work in healthcare  Should I ask my doctor to excuse me from work until the baby is born? What if I work in a school, the travel [place], or some other high-risk setting? Healthcare facilities should take care to limit the exposure of pregnant employees to patients with confirmed or suspected COVID-19, just as they would with other infectious cases  If you continue working, be sure to follow the CDC's risk assessment and infection control guidelines  If you work in a school, travel [place], or other high-risk setting, talk with your employer about what it's doing to protect employees and minimize infection risks  Wash your hands often  What if my OB gets COVID-19?   If your doctor or midwife tests positive for COVID-19, they will need to be quarantined until they recover and are no longer at risk of transmitting the virus  In this case, you'll be assigned to another OB in your doctor's practice (or you may choose another practitioner yourself)  Ask your new OB or your doctor's office if you should self-quarantine or be tested for the virus  (It will depend on when you last saw your provider and when that person tested positive )    Should we hold off on trying to conceive because of COVID-19? At this time, there's no reason to hold off on trying to get pregnant, but the data we have is really limited  For example, we don't think the virus causes birth defects or increases your risk of miscarriage  But we don't know whether you could transmit COVID-19 to your baby before or during delivery  We also don't know if the virus lives in semen or can be sexually transmitted  We have a babymoon scheduled in the next few months - should we cancel? If you're planning to travel internationally or on a cruise, you should strongly consider canceling  At this time, the virus has reached more than 140 countries, and there are travel bans to [place], most of [place], and [place]  Places where large numbers of people gather are at highest risk, especially airports and cruise ships  If you're planning travel in the [place] , note that any travel setting increases your risk of exposure, and there may be travel bans even in [place] by the time you're scheduled to go  Even if you're state is not blocked, you'll definitely want to avoid traveling to communities where the virus is spreading  To find out where these places are, check The [place] Times map based on CDC data  For the most current advice to help you avoid exposure, check the CDC's COVID-19 travel page  Will the hospital separate me from my  and keep the baby in quarantine?   If you test positive for COVID-19 or have been exposed but have no symptoms, the CDC, [employer] of Obstetricians and Gynecologists, and the Society for Maternal-Fetal Medicine all recommend that you be  from your baby to decrease the risk of transmission to the baby  This would last until you are no longer at risk of transmitting the virus  If you do not have COVID-19 and have not been exposed to the virus, the hospital will not separate you from your   My hospital is restricting visitors and only allowing one support person  If my support person leaves after the delivery, will they be allowed to come back? Every hospital has different policies  Contact your hospital or labor and delivery unit a week or so before delivery to get the most up-to-date restrictions  In general, if your support person needs to leave, they would be allowed back unless they knew they were exposed to COVID-19 after leaving your company  [facility] understands that the coronavirus (COVID-19) pandemic is an evolving story and that your questions will change over time  We'll continue asking moms and dads in our Community what they want to know, and we'll get the answers from experts to keep them - and you - informed and supported  My mom was planning to fly here to help me care for my new baby after delivery  Should I tell her not to come? Yes  If your mom is over 61 or has any serious chronic medical conditions (such as heart disease, lung disease, or diabetes), she is at higher risk of serious illness from COVID-19 and should avoid air travel  And remember that any travel setting increases a person's risk of exposure  So, it may be risky to have her around the baby after she has been traveling  For the most current advice on traveling, check the CDC's COVID-19 travel page  [facility] understands that the coronavirus pandemic is an evolving story and that your questions will change over time   We'll continue asking moms and dads in our Community what they want to know, and we'll get the answers from experts to keep them - and you - informed and supported

## 2021-06-07 NOTE — PROGRESS NOTES
OB/GYN prenatal visit    S: 30 y o  Mary Guillory 28w2d here for PN visit  She has no obstetric complaints, including pelvic pain, contractions, vaginal bleeding, loss of fluid, or decreased fetal movement  KONUX  [place] [id number] used for visit  O:  Vitals:    21 1000   BP: 116/59   Pulse: 82   Weight: 76 2 kg (168 lb)   Height: 5' (1 524 m)       TW lb   Gen: no acute distress, nonlabored breathing  Cardio: RRR, S1/S2 normal   Lungs: CTAB, good effort   Abd: soft, nontender  Gravid uterus  Fundal Height (cm): 28 cm  Fetal Heart Rate: 150    A/P:  IUP @ 28w2d   - EFW 1190g (): no further U/S at this time   - 28 Week lab slips given   - Continue PNV  - BP's WNL this pregnancy thus far  - Delivery consent signed today   - tdap given today     HX prior C/S  - Plans on RLTCS + BTL    Hx COVID in pregnancy  - Positive test 2021  - Discussed COVID vaccination in pregnancy   - Discussed signing up through [facility] My Chart     Postpartum Contraception  - Desires permanent sterilization   - MA-31 signed 5/10/2021    RTC in 2 weeks for routine prenatal care      Danika Allison MD  2021  12:18 PM

## 2021-06-21 PROBLEM — Z3A.30 30 WEEKS GESTATION OF PREGNANCY: Status: ACTIVE | Noted: 2021-05-18

## 2021-06-25 ENCOUNTER — HOSPITAL ENCOUNTER (EMERGENCY)
Facility: HOSPITAL | Age: 36
Discharge: HOME | End: 2021-06-25
Attending: EMERGENCY MEDICINE | Admitting: EMERGENCY MEDICINE
Payer: COMMERCIAL

## 2021-06-25 VITALS
HEART RATE: 92 BPM | TEMPERATURE: 98.4 F | SYSTOLIC BLOOD PRESSURE: 126 MMHG | RESPIRATION RATE: 18 BRPM | OXYGEN SATURATION: 96 % | DIASTOLIC BLOOD PRESSURE: 80 MMHG

## 2021-06-25 DIAGNOSIS — O46.90 VAGINAL BLEEDING DURING PREGNANCY: Primary | ICD-10-CM

## 2021-06-25 PROCEDURE — 99284 EMERGENCY DEPT VISIT MOD MDM: CPT | Performed by: EMERGENCY MEDICINE

## 2021-06-25 PROCEDURE — 76815 OB US LIMITED FETUS(S): CPT | Performed by: EMERGENCY MEDICINE

## 2021-06-25 PROCEDURE — 99284 EMERGENCY DEPT VISIT MOD MDM: CPT

## 2021-06-26 ENCOUNTER — DOCUMENTATION (OUTPATIENT)
Dept: LABOR AND DELIVERY | Facility: HOSPITAL | Age: 36
End: 2021-06-26

## 2021-06-26 NOTE — ED PROVIDER NOTES
History  Chief Complaint   Patient presents with    Vaginal Bleeding - Pregnant     31 weeks pregnant per pt small amount of bleeding, small amount of lower abdminal pressure     61-year-old female, currently 31 weeks pregnant presenting due to vaginal bleeding  Patient states that earlier today she had a moderate amount of vaginal bleeding  Denies any clots and states that this was just liquid blood that has since resolved  States that she does have some abdominal tightness which has been ongoing for 1 week denies any worsening of the abdominal pain  Denies any cramping or contractions  Denies any issues or complications throughout her pregnancy and states that she had 1 prior pregnancy with no complications  Denies any injury or trauma prior to the bleeding  Currently denies any symptoms and denies headache, chest pain, dyspnea, urinary symptoms, constipation  Prior to Admission Medications   Prescriptions Last Dose Informant Patient Reported? Taking? Ascorbic Acid (vitamin C) 1000 MG tablet  Self No No   Sig: Take 1 tablet (1,000 mg total) by mouth 2 (two) times a day   Patient not taking: Reported on 2021   Multiple Vitamin (multivitamin) tablet  Self No No   Sig: Take 1 tablet by mouth daily   cholecalciferol (VITAMIN D3) 1,000 units tablet  Self No No   Sig: Take 2 tablets (2,000 Units total) by mouth daily   Patient not taking: Reported on 2021      Facility-Administered Medications: None       History reviewed  No pertinent past medical history      Past Surgical History:   Procedure Laterality Date     SECTION         Family History   Problem Relation Age of Onset    No Known Problems Mother     No Known Problems Father     No Known Problems Sister     No Known Problems Brother     No Known Problems Daughter     No Known Problems Maternal Grandmother     No Known Problems Maternal Grandfather     No Known Problems Paternal Grandmother     No Known Problems Paternal Grandfather     No Known Problems Sister     No Known Problems Sister     No Known Problems Sister     No Known Problems Sister     No Known Problems Sister     No Known Problems Sister     No Known Problems Sister      I have reviewed and agree with the history as documented  E-Cigarette/Vaping    E-Cigarette Use Never User      E-Cigarette/Vaping Substances    Nicotine No     THC No     CBD No     Flavoring No     Other No     Unknown No      Social History     Tobacco Use    Smoking status: Never Smoker    Smokeless tobacco: Never Used   Vaping Use    Vaping Use: Never used   Substance Use Topics    Alcohol use: Never    Drug use: Never        Review of Systems   Constitutional: Negative for chills and fever  HENT: Negative for sore throat  Eyes: Negative for visual disturbance  Respiratory: Negative for cough and shortness of breath  Cardiovascular: Negative for chest pain and palpitations  Gastrointestinal: Negative for abdominal pain and vomiting  Genitourinary: Positive for vaginal bleeding  Negative for dysuria, hematuria, vaginal discharge and vaginal pain  Musculoskeletal: Negative for arthralgias and back pain  Skin: Negative for color change and rash  Neurological: Negative for syncope  All other systems reviewed and are negative  Physical Exam  ED Triage Vitals [06/25/21 1858]   Temperature Pulse Respirations Blood Pressure SpO2   98 4 °F (36 9 °C) 94 20 123/83 97 %      Temp Source Heart Rate Source Patient Position - Orthostatic VS BP Location FiO2 (%)   Tympanic -- Lying Right arm --      Pain Score       --             Orthostatic Vital Signs  Vitals:    06/25/21 1858 06/25/21 2100   BP: 123/83 126/80   Pulse: 94 92   Patient Position - Orthostatic VS: Lying        Physical Exam  Vitals and nursing note reviewed  Constitutional:       General: She is not in acute distress  Appearance: She is well-developed     HENT:      Head: Normocephalic and atraumatic  Eyes:      Conjunctiva/sclera: Conjunctivae normal    Cardiovascular:      Rate and Rhythm: Normal rate and regular rhythm  Pulmonary:      Effort: Pulmonary effort is normal       Breath sounds: Normal breath sounds  Abdominal:      General: There is no distension  Palpations: Abdomen is soft  Tenderness: There is no abdominal tenderness  Musculoskeletal:      Cervical back: Neck supple  Skin:     General: Skin is warm and dry  Capillary Refill: Capillary refill takes less than 2 seconds  Neurological:      Mental Status: She is alert  ED Medications  Medications - No data to display    Diagnostic Studies  Results Reviewed     None                 No orders to display         Procedures  POC Pelvic US    Date/Time: 6/25/2021 11:42 PM  Performed by: Eliana Perez DO  Authorized by: Eliana Perez DO     Patient location:  ED  Other Assisting Provider: No    Procedure details:     Exam Type:  Diagnostic    Indications: evaluate for IUP and pregnant with vaginal bleeding      Assessment for: evaluate fetal viability      Image quality: diagnostic      Image availability:  Video obtained, still images obtained and images available in PACS  Uterine findings:     Intrauterine pregnancy: identified      Fetal heart rate (bpm):  162  Interpretation:     Ultrasound impressions: normal      Pregnancy findings: intrauterine pregnancy (IUP)            ED Course                                       MDM  Number of Diagnoses or Management Options  Vaginal bleeding during pregnancy  Diagnosis management comments: Bedside ultrasound showed IUP and fetal heart rate obtained  Contacted OB on-call who recommended patient be transferred to [facility] for close monitoring  Plans are to obtain blood work at [facility]  Initial plans were to have her transfer via ambulance, but patient declined and states she prefers to drive herself    Was provided address and contact information and patient was stable at time of discharge  Disposition  Final diagnoses:   Vaginal bleeding during pregnancy     Time reflects when diagnosis was documented in both MDM as applicable and the Disposition within this note     Time User Action Codes Description Comment    6/25/2021  9:56 PM Frances Meléndez Add [O46 90] Vaginal bleeding during pregnancy       ED Disposition     ED Disposition Condition Date/Time Comment    Discharge Stable Fri Jun 25, 2021 10:42 PM [facility] should be transferred out to [facility]          MD Documentation      Most Recent Value   Patient Condition  The patient has been stabilized such that within reasonable medical probability, no material deterioration of the patient condition or the condition of the unborn child(rashawn) is likely to result from the transfer   Reason for Transfer  Level of Care needed not available at this facility   Benefits of Transfer  Specialized equipment and/or services available at the receiving facility (Include comment)________________________, Continuity of care   Risks of Transfer  Potential for delay in receiving treatment, Potential deterioration of medical condition   Accepting Physician  Suleman Bonilla   Sending MD Schilling      RN Documentation      Most [address] [facility] Suleman Aguayo      Follow-up Information     Follow up With Specialties Details Why Contact Info Additional Information    [place] [facility] Go today  [address] [place] [id number] [phone] [place] [facility] [facility] [phone]          Discharge Medication List as of 6/25/2021 10:43 PM      CONTINUE these medications which have NOT CHANGED    Details   Ascorbic Acid (vitamin C) 1000 MG tablet Take 1 tablet (1,000 mg total) by mouth 2 (two) times a day, Starting Wed 5/12/2021, Print      cholecalciferol (VITAMIN D3) 1,000 units tablet Take 2 tablets (2,000 Units total) by mouth daily, Starting Wed 5/12/2021, Print      Multiple Vitamin (multivitamin) tablet Take 1 tablet by mouth daily, Starting Wed 5/12/2021, Print           No discharge procedures on file  PDMP Review     None           ED Provider  Attending physically available and evaluated Maykel Sloan I managed the patient along with the ED Attending      Electronically Signed by         Shayla Clark DO  06/25/21 9243

## 2021-06-26 NOTE — ED ATTENDING ATTESTATION
6/25/2021  IClayton DO, saw and evaluated the patient  I have discussed the patient with the resident/non-physician practitioner and agree with the resident's/non-physician practitioner's findings, Plan of Care, and MDM as documented in the resident's/non-physician practitioner's note, except where noted  All available labs and Radiology studies were reviewed  I was present for key portions of any procedure(s) performed by the resident/non-physician practitioner and I was immediately available to provide assistance  At this point I agree with the current assessment done in the Emergency Department  I have conducted an independent evaluation of this patient a history and physical is as follows:    29-year-old female 32 weeks gestation presents for lower abdominal cramping and vaginal bleeding  Scant bleeding  Cramping in lower abdomen  No contractions that she is aware of  No other modifying factors or associated symptoms  Otherwise feels well  On remarkable prenatal   Thus far  Bedside ultrasound shows positive fetal movement with a heart rate of 155   Plan is to discuss OB for transfer due to vaginal bleeding concern for placenta previa     ED Course         Critical Care Time  Procedures

## 2021-06-26 NOTE — PROGRESS NOTES
I was contacted by [facility] to accept transfer of this patient for h/o bleeding earlier in the day on 6/25/21  I accepted transfer, pt was to be picked up by ambulance at [phone] on 6/25/21  I later received a phone call that pt strongly desired to not be transferred by ambulance but come in her own vehicle  I stated that this was not the ideal, but if that was the only way the patient would come, that was acceptable  Pt was apparently discharged to come here to [facility] but never arrived for evaluation

## 2021-06-26 NOTE — EMTALA/ACUTE CARE TRANSFER
[facility] EMERGENCY DEPARTMENT  [address] [place] [address]  Dept: [phone]      UYPUNS TRANSFER CONSENT    NAME Ricky De Anda                                         1985                              MRN [id number]    I have been informed of my rights regarding examination, treatment, and transfer   by Dr Ashish Batres DO    Benefits: Specialized equipment and/or services available at the receiving facility (Include comment)________________________, Continuity of care    Risks: Potential for delay in receiving treatment, Potential deterioration of medical condition      Consent for Transfer:  I acknowledge that my medical condition has been evaluated and explained to me by the emergency department physician or other qualified medical person and/or my attending physician, who has recommended that I be transferred to the service of  Accepting Physician: Dr Marian Klinefelter at [facility] [address], [address] [facility]  The above potential benefits of such transfer, the potential risks associated with such transfer, and the probable risks of not being transferred have been explained to me, and I fully understand them  The doctor has explained that, in my case, the benefits of transfer outweigh the risks  I agree to be transferred  I authorize the performance of emergency medical procedures and treatments upon me in both transit and upon arrival at the receiving facility  Additionally, I authorize the release of any and all medical records to the receiving facility and request they be transported with me, if possible  I understand that the safest mode of transportation during a medical emergency is an ambulance and that the Hospital advocates the use of this mode of transport   Risks of traveling to the receiving facility by car, including absence of medical control, life sustaining equipment, such as oxygen, and medical personnel has been explained to me and I fully understand them  (DANIA CORRECT BOX BELOW)  [  ]  I consent to the stated transfer and to be transported by ambulance/helicopter  [  ]  I consent to the stated transfer, but refuse transportation by ambulance and accept full responsibility for my transportation by car  I understand the risks of non-ambulance transfers and I exonerate the Hospital and its staff from any deterioration in my condition that results from this refusal     X___________________________________________    DATE  21  TIME________  Signature of patient or legally responsible individual signing on patient behalf           RELATIONSHIP TO PATIENT_________________________          Provider Certification    NAME Richard Cobb                                         1985                              MRN [id number]    A medical screening exam was performed on the above named patient  Based on the examination:    Condition Necessitating Transfer The encounter diagnosis was Vaginal bleeding during pregnancy  Patient Condition: The patient has been stabilized such that within reasonable medical probability, no material deterioration of the patient condition or the condition of the unborn child(rashawn) is likely to result from the transfer    Reason for Transfer: Level of Care needed not available at this facility    Transfer Requirements: [address]   · Space available and qualified personnel available for treatment as acknowledged by    · Agreed to accept transfer and to provide appropriate medical treatment as acknowledged by       Dr Jay Santos  · Appropriate medical records of the examination and treatment of the patient are provided at the time of transfer   [address] _______  · Transfer will be performed by qualified personnel from    and appropriate transfer equipment as required, including the use of necessary and appropriate life support measures      Provider Certification: I have examined the patient and explained the following risks and benefits of being transferred/refusing transfer to the patient/family:         Based on these reasonable risks and benefits to the patient and/or the unborn child(rashawn), and based upon the information available at the time of the patients examination, I certify that the medical benefits reasonably to be expected from the provision of appropriate medical treatments at another medical facility outweigh the increasing risks, if any, to the individuals medical condition, and in the case of labor to the unborn child, from effecting the transfer      X____________________________________________ DATE 06/25/21        TIME_______      ORIGINAL - SEND TO MEDICAL RECORDS   COPY - SEND WITH PATIENT DURING TRANSFER

## 2021-06-28 PROBLEM — Z3A.31 31 WEEKS GESTATION OF PREGNANCY: Status: ACTIVE | Noted: 2021-05-18

## 2021-06-28 PROBLEM — Z34.93 PRENATAL CARE IN THIRD TRIMESTER: Status: ACTIVE | Noted: 2021-05-18

## 2021-06-28 NOTE — ASSESSMENT & PLAN NOTE
Last US   None further indicated at this time  FU 28-w labs: encouraged to get them done asap  Lab location sheet provided    Reinforced that she should present to [place] in the future  Fetal kick counts   labor precautions reviewed  RTO in 2 weeks

## 2021-06-29 ENCOUNTER — APPOINTMENT (OUTPATIENT)
Dept: LAB | Facility: HOSPITAL | Age: 36
End: 2021-06-29
Payer: COMMERCIAL

## 2021-06-29 ENCOUNTER — ROUTINE PRENATAL (OUTPATIENT)
Dept: OBGYN CLINIC | Facility: CLINIC | Age: 36
End: 2021-06-29

## 2021-06-29 VITALS
RESPIRATION RATE: 16 BRPM | SYSTOLIC BLOOD PRESSURE: 117 MMHG | WEIGHT: 169.2 LBS | BODY MASS INDEX: 33.04 KG/M2 | HEART RATE: 100 BPM | DIASTOLIC BLOOD PRESSURE: 77 MMHG

## 2021-06-29 DIAGNOSIS — M54.9 BACK PAIN IN PREGNANCY: ICD-10-CM

## 2021-06-29 DIAGNOSIS — Z34.92 PRENATAL CARE IN SECOND TRIMESTER: ICD-10-CM

## 2021-06-29 DIAGNOSIS — O99.891 BACK PAIN IN PREGNANCY: ICD-10-CM

## 2021-06-29 DIAGNOSIS — Z3A.31 31 WEEKS GESTATION OF PREGNANCY: Primary | ICD-10-CM

## 2021-06-29 DIAGNOSIS — Z34.93 PRENATAL CARE IN THIRD TRIMESTER: ICD-10-CM

## 2021-06-29 DIAGNOSIS — Z98.891 HISTORY OF CESAREAN DELIVERY: ICD-10-CM

## 2021-06-29 PROBLEM — E66.9 OBESITY (BMI 30.0-34.9): Status: ACTIVE | Noted: 2021-06-29

## 2021-06-29 PROBLEM — E66.811 OBESITY (BMI 30.0-34.9): Status: ACTIVE | Noted: 2021-06-29

## 2021-06-29 LAB
ABO GROUP BLD: NORMAL
BLD GP AB SCN SERPL QL: NEGATIVE
ERYTHROCYTE [DISTWIDTH] IN BLOOD BY AUTOMATED COUNT: 16 % (ref 11.6–15.1)
GLUCOSE 1H P 50 G GLC PO SERPL-MCNC: 125 MG/DL (ref 40–134)
HCT VFR BLD AUTO: 37.1 % (ref 34.8–46.1)
HGB BLD-MCNC: 11.8 G/DL (ref 11.5–15.4)
MCH RBC QN AUTO: 28.1 PG (ref 26.8–34.3)
MCHC RBC AUTO-ENTMCNC: 31.8 G/DL (ref 31.4–37.4)
MCV RBC AUTO: 88 FL (ref 82–98)
PLATELET # BLD AUTO: 225 THOUSANDS/UL (ref 149–390)
PMV BLD AUTO: 11.8 FL (ref 8.9–12.7)
RBC # BLD AUTO: 4.2 MILLION/UL (ref 3.81–5.12)
RH BLD: POSITIVE
SPECIMEN EXPIRATION DATE: NORMAL
WBC # BLD AUTO: 9.34 THOUSAND/UL (ref 4.31–10.16)

## 2021-06-29 PROCEDURE — 86850 RBC ANTIBODY SCREEN: CPT

## 2021-06-29 PROCEDURE — 82950 GLUCOSE TEST: CPT

## 2021-06-29 PROCEDURE — 85027 COMPLETE CBC AUTOMATED: CPT

## 2021-06-29 PROCEDURE — 99213 OFFICE O/P EST LOW 20 MIN: CPT | Performed by: OBSTETRICS & GYNECOLOGY

## 2021-06-29 PROCEDURE — 86900 BLOOD TYPING SEROLOGIC ABO: CPT

## 2021-06-29 PROCEDURE — 86592 SYPHILIS TEST NON-TREP QUAL: CPT

## 2021-06-29 PROCEDURE — 86901 BLOOD TYPING SEROLOGIC RH(D): CPT

## 2021-06-29 PROCEDURE — 36415 COLL VENOUS BLD VENIPUNCTURE: CPT

## 2021-06-29 NOTE — PATIENT INSTRUCTIONS
Pregnancy at 31 to 34 Weeks   AMBULATORY CARE:   What changes are happening to your body: You may continue to have symptoms such as shortness of breath, heartburn, contractions, or swelling of your ankles and feet  You may be gaining about 1 pound a week now  Seek care immediately if:   · You develop a severe headache that does not go away  · You have new or increased vision changes, such as blurred or spotted vision  · You have new or increased swelling in your face or hands  · You have vaginal spotting or bleeding  · Your water broke or you feel warm water gushing or trickling from your vagina  Contact your healthcare provider if:   · You have more than 5 contractions in 1 hour  · You notice any changes in your baby's movements  · You have abdominal cramps, pressure, or tightening  · You have a change in vaginal discharge  · You have chills or a fever  · You have vaginal itching, burning, or pain  · You have yellow, green, white, or foul-smelling vaginal discharge  · You have pain or burning when you urinate, less urine than usual, or pink or bloody urine  · You have questions or concerns about your condition or care  How to care for yourself at this stage of your pregnancy:   · Eat a variety of healthy foods  Healthy foods include fruits, vegetables, whole-grain breads, low-fat dairy foods, beans, lean meats, and fish  Drink liquids as directed  Ask how much liquid to drink each day and which liquids are best for you  Limit caffeine to less than 200 milligrams each day  Limit your intake of fish to 2 servings each week  Choose fish low in mercury such as canned light tuna, shrimp, salmon, cod, or tilapia  Do not  eat fish high in mercury such as swordfish, tilefish, virginia mackerel, and shark  · Manage heartburn  by eating 4 or 5 small meals each day instead of large meals  Avoid spicy food  · Manage swelling  by lying down and putting your feet up  · Take prenatal vitamins as directed  Your need for certain vitamins and minerals, such as folic acid, increases during pregnancy  Prenatal vitamins provide some of the extra vitamins and minerals you need  Prenatal vitamins may also help to decrease the risk of certain birth defects  · Talk to your healthcare provider about exercise  Moderate exercise can help you stay fit  Your healthcare provider will help you plan an exercise program that is safe for you during pregnancy  · Do not smoke  Smoking increases your risk of a miscarriage and other health problems during your pregnancy  Smoking can cause your baby to be born too early or weigh less at birth  Ask your healthcare provider for information if you need help quitting  · Do not drink alcohol  Alcohol passes from your body to your baby through the placenta  It can affect your baby's brain development and cause fetal alcohol syndrome (FAS)  FAS is a group of conditions that causes mental, behavior, and growth problems  · Talk to your healthcare provider before you take any medicines  Many medicines may harm your baby if you take them when you are pregnant  Do not take any medicines, vitamins, herbs, or supplements without first talking to your healthcare provider  Never use illegal or street drugs (such as marijuana or cocaine) while you are pregnant  Safety tips during pregnancy:   · Avoid hot tubs and saunas  Do not use a hot tub or sauna while you are pregnant, especially during your first trimester  Hot tubs and saunas may raise your baby's temperature and increase the risk of birth defects  · Avoid toxoplasmosis  This is an infection caused by eating raw meat or being around infected cat feces  It can cause birth defects, miscarriages, and other problems  Wash your hands after you touch raw meat  Make sure any meat is well-cooked before you eat it  Avoid raw eggs and unpasteurized milk   Use gloves or ask someone else to clean your cat's litter box while you are pregnant  Changes that are happening with your baby:  By 34 weeks, your baby may weigh more than 5 pounds  Your baby will be about 12 ½ inches long from the top of the head to the rump (baby's bottom)  Your baby is gaining about ½ pound a week  Your baby's eyes open and close now  Your baby's kicks and movements are more forceful at this time  What you need to know about prenatal care: Your healthcare provider will check your blood pressure and weight  You may also need the following:  · A urine test  may also be done to check for sugar and protein  These can be signs of gestational diabetes or infection  Protein in your urine may also be a sign of preeclampsia  Preeclampsia is a condition that can develop during week 20 or later of your pregnancy  It causes high blood pressure, and it can cause problems with your kidneys and other organs  · A Tdap vaccine  may be recommended by your healthcare provider  · Fundal height  is a measurement of your uterus to check your baby's growth  This number is usually the same as the number of weeks that you have been pregnant  Your healthcare provider may also check your baby's position  · Your baby's heart rate  will be checked  © Copyright [address] Information is for End User's use only and may not be sold, redistributed or otherwise used for commercial purposes  All illustrations and images included in CareNotes® are the copyrighted property of A D A M , Inc  or [facility] Adrian Gill   The above information is an  only  It is not intended as medical advice for individual conditions or treatments  Talk to your doctor, nurse or pharmacist before following any medical regimen to see if it is safe and effective for you

## 2021-06-29 NOTE — PROGRESS NOTES
Ezio Vázquez presents today for routine OB visit at [address] [facility]  [employer]  ID EL [id number] was used for this encounter  S:  She reports pain in her lower back   Had vaginal vaginal bleeding on Friday and was at [facility]  Refused EMS transfer to [facility] and did not present by car  The vaginal bleeding stopped on Friday night  There was no associated pain at that time  Denies uterine contractions  Denies LOF  Reports adequate fetal movement of at least 10 movements in 2 hours once daily  O:  Blood Pressure: 117/77  Wt=76 7 kg (169 lb 3 2 oz); Body mass index is 33 04 kg/m² ; Fetal Heart Rate: 147; Fundal Height (cm): 32 cm    Pulm: Non-labored breathing  Abdomen: gravid, soft, non-tender  Extremities: non-tender, no edema  Third trimester bloodwork pending  A/P:  Problem List Items Addressed This Visit        Other    31 weeks gestation of pregnancy - Primary     Last US   None further indicated at this time  FU 28-w labs: encouraged to get them done asap  Lab location sheet provided  Reinforced that she should present to [facility] in the future  Fetal kick counts   labor precautions reviewed  RTO in 2 weeks             Prenatal care in third trimester    History of  delivery     For RLTCS and BTL   MA-31 signed 5/10/21         Back pain in pregnancy     Tylenol PRN and pregnancy support band recommended  Patient discussed with   [id number]  [facility] Caren Mccain MD  PGY II, OB/GYN  2021, 8:18 PM

## 2021-06-30 LAB — RPR SER QL: NORMAL

## 2021-07-01 ENCOUNTER — TELEPHONE (OUTPATIENT)
Dept: OBGYN CLINIC | Facility: CLINIC | Age: 36
End: 2021-07-01

## 2021-07-01 NOTE — TELEPHONE ENCOUNTER
----- Message from Beulah Peacock MD sent at 7/1/2021 12:34 AM EDT -----  Please notify patient that 28 week labs (Glucose test, RPR, CBC) were all normal

## 2021-07-12 PROBLEM — Z3A.33 33 WEEKS GESTATION OF PREGNANCY: Status: ACTIVE | Noted: 2021-05-18

## 2021-07-15 ENCOUNTER — HOSPITAL ENCOUNTER (INPATIENT)
Facility: HOSPITAL | Age: 36
LOS: 3 days | Discharge: HOME | DRG: 566 | End: 2021-07-19
Attending: OBSTETRICS & GYNECOLOGY | Admitting: OBSTETRICS & GYNECOLOGY
Payer: COMMERCIAL

## 2021-07-15 DIAGNOSIS — O23.40 URINARY TRACT INFECTION AFFECTING PREGNANCY: ICD-10-CM

## 2021-07-15 DIAGNOSIS — E66.9 OBESITY (BMI 30.0-34.9): Primary | ICD-10-CM

## 2021-07-15 DIAGNOSIS — Z3A.33 33 WEEKS GESTATION OF PREGNANCY: ICD-10-CM

## 2021-07-15 LAB
A1 MICROGLOB PLACENTAL VAG QL: POSITIVE
ABO GROUP BLD: NORMAL
ALBUMIN SERPL BCP-MCNC: 2.5 G/DL (ref 3.5–5)
ALP SERPL-CCNC: 132 U/L (ref 46–116)
ALT SERPL W P-5'-P-CCNC: 16 U/L (ref 12–78)
ANION GAP SERPL CALCULATED.3IONS-SCNC: 14 MMOL/L (ref 4–13)
APTT PPP: 20 SECONDS (ref 23–37)
APTT PPP: 21 SECONDS (ref 23–37)
AST SERPL W P-5'-P-CCNC: 15 U/L (ref 5–45)
BASOPHILS # BLD MANUAL: 0 THOUSAND/UL (ref 0–0.1)
BASOPHILS NFR MAR MANUAL: 0 % (ref 0–1)
BILIRUB SERPL-MCNC: 0.25 MG/DL (ref 0.2–1)
BLD GP AB SCN SERPL QL: NEGATIVE
BUN SERPL-MCNC: 7 MG/DL (ref 5–25)
CALCIUM ALBUM COR SERPL-MCNC: 10.1 MG/DL (ref 8.3–10.1)
CALCIUM SERPL-MCNC: 8.9 MG/DL (ref 8.3–10.1)
CHLORIDE SERPL-SCNC: 102 MMOL/L (ref 100–108)
CO2 SERPL-SCNC: 17 MMOL/L (ref 21–32)
CREAT SERPL-MCNC: 0.71 MG/DL (ref 0.6–1.3)
EOSINOPHIL # BLD MANUAL: 0 THOUSAND/UL (ref 0–0.4)
EOSINOPHIL NFR BLD MANUAL: 0 % (ref 0–6)
ERYTHROCYTE [DISTWIDTH] IN BLOOD BY AUTOMATED COUNT: 16.4 % (ref 11.6–15.1)
ERYTHROCYTE [DISTWIDTH] IN BLOOD BY AUTOMATED COUNT: 16.5 % (ref 11.6–15.1)
FETAL RBC/1000 RBC BLD KLEIH BETKE-RTO: 0 % (ref 0–1)
FIBRINOGEN PPP-MCNC: 369 MG/DL (ref 227–495)
FIBRINOGEN PPP-MCNC: 383 MG/DL (ref 227–495)
GFR SERPL CREATININE-BSD FRML MDRD: 111 ML/MIN/1.73SQ M
GLUCOSE SERPL-MCNC: 134 MG/DL (ref 65–140)
HCT VFR BLD AUTO: 30.4 % (ref 34.8–46.1)
HCT VFR BLD AUTO: 31.1 % (ref 34.8–46.1)
HGB BLD-MCNC: 10.1 G/DL (ref 11.5–15.4)
HGB BLD-MCNC: 9.9 G/DL (ref 11.5–15.4)
INR PPP: 1.01 (ref 0.84–1.19)
INR PPP: 1.03 (ref 0.84–1.19)
LYMPHOCYTES # BLD AUTO: 10 % (ref 14–44)
LYMPHOCYTES # BLD AUTO: 2.01 THOUSAND/UL (ref 0.6–4.47)
MCH RBC QN AUTO: 27.6 PG (ref 26.8–34.3)
MCH RBC QN AUTO: 27.6 PG (ref 26.8–34.3)
MCHC RBC AUTO-ENTMCNC: 32.5 G/DL (ref 31.4–37.4)
MCHC RBC AUTO-ENTMCNC: 32.6 G/DL (ref 31.4–37.4)
MCV RBC AUTO: 85 FL (ref 82–98)
MCV RBC AUTO: 85 FL (ref 82–98)
MONOCYTES # BLD AUTO: 1.81 THOUSAND/UL (ref 0–1.22)
MONOCYTES NFR BLD: 9 % (ref 4–12)
MYELOCYTES NFR BLD MANUAL: 1 % (ref 0–1)
NEUTROPHILS # BLD MANUAL: 16.06 THOUSAND/UL (ref 1.85–7.62)
NEUTS BAND NFR BLD MANUAL: 1 % (ref 0–8)
NEUTS SEG NFR BLD AUTO: 79 % (ref 43–75)
NRBC BLD AUTO-RTO: 0 /100 WBCS
PLATELET # BLD AUTO: 254 THOUSANDS/UL (ref 149–390)
PLATELET # BLD AUTO: 254 THOUSANDS/UL (ref 149–390)
PLATELET # BLD AUTO: 273 THOUSANDS/UL (ref 149–390)
PLATELET BLD QL SMEAR: ADEQUATE
PMV BLD AUTO: 11.1 FL (ref 8.9–12.7)
PMV BLD AUTO: 11.1 FL (ref 8.9–12.7)
PMV BLD AUTO: 11.2 FL (ref 8.9–12.7)
POTASSIUM SERPL-SCNC: 3.8 MMOL/L (ref 3.5–5.3)
PROT SERPL-MCNC: 6.5 G/DL (ref 6.4–8.2)
PROTHROMBIN TIME: 13.4 SECONDS (ref 11.6–14.5)
PROTHROMBIN TIME: 13.6 SECONDS (ref 11.6–14.5)
RBC # BLD AUTO: 3.59 MILLION/UL (ref 3.81–5.12)
RBC # BLD AUTO: 3.66 MILLION/UL (ref 3.81–5.12)
RH BLD: POSITIVE
SODIUM SERPL-SCNC: 133 MMOL/L (ref 136–145)
SPECIMEN EXPIRATION DATE: NORMAL
TOTAL CELLS COUNTED SPEC: 100
WBC # BLD AUTO: 16.81 THOUSAND/UL (ref 4.31–10.16)
WBC # BLD AUTO: 20.08 THOUSAND/UL (ref 4.31–10.16)

## 2021-07-15 PROCEDURE — 87150 DNA/RNA AMPLIFIED PROBE: CPT

## 2021-07-15 PROCEDURE — 85027 COMPLETE CBC AUTOMATED: CPT

## 2021-07-15 PROCEDURE — 99213 OFFICE O/P EST LOW 20 MIN: CPT

## 2021-07-15 PROCEDURE — 85460 HEMOGLOBIN FETAL: CPT

## 2021-07-15 PROCEDURE — 85610 PROTHROMBIN TIME: CPT | Performed by: STUDENT IN AN ORGANIZED HEALTH CARE EDUCATION/TRAINING PROGRAM

## 2021-07-15 PROCEDURE — 85670 THROMBIN TIME PLASMA: CPT | Performed by: STUDENT IN AN ORGANIZED HEALTH CARE EDUCATION/TRAINING PROGRAM

## 2021-07-15 PROCEDURE — 85730 THROMBOPLASTIN TIME PARTIAL: CPT | Performed by: STUDENT IN AN ORGANIZED HEALTH CARE EDUCATION/TRAINING PROGRAM

## 2021-07-15 PROCEDURE — 85384 FIBRINOGEN ACTIVITY: CPT | Performed by: STUDENT IN AN ORGANIZED HEALTH CARE EDUCATION/TRAINING PROGRAM

## 2021-07-15 PROCEDURE — 80053 COMPREHEN METABOLIC PANEL: CPT

## 2021-07-15 PROCEDURE — 4A1HXCZ MONITORING OF PRODUCTS OF CONCEPTION, CARDIAC RATE, EXTERNAL APPROACH: ICD-10-PCS | Performed by: OBSTETRICS & GYNECOLOGY

## 2021-07-15 PROCEDURE — 85610 PROTHROMBIN TIME: CPT

## 2021-07-15 PROCEDURE — 85730 THROMBOPLASTIN TIME PARTIAL: CPT

## 2021-07-15 PROCEDURE — 85027 COMPLETE CBC AUTOMATED: CPT | Performed by: STUDENT IN AN ORGANIZED HEALTH CARE EDUCATION/TRAINING PROGRAM

## 2021-07-15 PROCEDURE — 85049 AUTOMATED PLATELET COUNT: CPT | Performed by: STUDENT IN AN ORGANIZED HEALTH CARE EDUCATION/TRAINING PROGRAM

## 2021-07-15 PROCEDURE — 86592 SYPHILIS TEST NON-TREP QUAL: CPT | Performed by: STUDENT IN AN ORGANIZED HEALTH CARE EDUCATION/TRAINING PROGRAM

## 2021-07-15 PROCEDURE — 85611 PROTHROMBIN TEST: CPT | Performed by: STUDENT IN AN ORGANIZED HEALTH CARE EDUCATION/TRAINING PROGRAM

## 2021-07-15 PROCEDURE — 86900 BLOOD TYPING SEROLOGIC ABO: CPT

## 2021-07-15 PROCEDURE — G0379 DIRECT REFER HOSPITAL OBSERV: HCPCS

## 2021-07-15 PROCEDURE — NC001 PR NO CHARGE: Performed by: OBSTETRICS & GYNECOLOGY

## 2021-07-15 PROCEDURE — 86850 RBC ANTIBODY SCREEN: CPT

## 2021-07-15 PROCEDURE — 85007 BL SMEAR W/DIFF WBC COUNT: CPT

## 2021-07-15 PROCEDURE — 85732 THROMBOPLASTIN TIME PARTIAL: CPT | Performed by: STUDENT IN AN ORGANIZED HEALTH CARE EDUCATION/TRAINING PROGRAM

## 2021-07-15 PROCEDURE — 84112 EVAL AMNIOTIC FLUID PROTEIN: CPT | Performed by: OBSTETRICS & GYNECOLOGY

## 2021-07-15 PROCEDURE — 86901 BLOOD TYPING SEROLOGIC RH(D): CPT

## 2021-07-15 PROCEDURE — 85384 FIBRINOGEN ACTIVITY: CPT

## 2021-07-15 RX ORDER — SODIUM CHLORIDE, SODIUM LACTATE, POTASSIUM CHLORIDE, CALCIUM CHLORIDE 600; 310; 30; 20 MG/100ML; MG/100ML; MG/100ML; MG/100ML
100 INJECTION, SOLUTION INTRAVENOUS CONTINUOUS
Status: DISCONTINUED | OUTPATIENT
Start: 2021-07-15 | End: 2021-07-16

## 2021-07-15 RX ADMIN — SODIUM CHLORIDE, SODIUM LACTATE, POTASSIUM CHLORIDE, AND CALCIUM CHLORIDE 100 ML/HR: .6; .31; .03; .02 INJECTION, SOLUTION INTRAVENOUS at 23:27

## 2021-07-15 RX ADMIN — SODIUM CHLORIDE, SODIUM LACTATE, POTASSIUM CHLORIDE, AND CALCIUM CHLORIDE 100 ML/HR: .6; .31; .03; .02 INJECTION, SOLUTION INTRAVENOUS at 15:40

## 2021-07-15 NOTE — H&P
[address] 30 y o  female MRN: [id number]  Unit/Bed#: -01 Encounter: [id number]      Assessment:  30 y o   at 33w5d admitted for vaginal bleeding due to placental abruption  EFW: 1190g (61%) on 21  VTX by transabdominal ultrasound       Plan:   Admit for observation  See MFM consult note for details    Patient of: [address]  This patient will be an OBSERVATION and I certify the anticipated length of stay is <2 Midnights  Discussed with Dr Annie Wheat and Dr Ryan Sanders:    Chief Complaint: vaginal bleeding     HPI: Janette Reardon is a 30 y o   with an RUEL of 2021, by Last Menstrual Period at 33w5d who is being evaluated for Vaginal bleeding  She was discharged yesterday after a two day admission at [facility] in [facility]  There she reports they told her she had an abruption  She denies placenta or vasa previa  She states that she got corticosteroids in [place], and was instructed to see her OB after discharge and started bleeding before she could follow up  She describes her bleeding as heavy with clots  She denies contractions but states she feel a little bit of pain in her suprapubic area  She recently transferred care to [facility] from [facility] at 24 weeks  Pregnancy complications:  Obesity, COVID-19, placental abruption, prior     Patient Active Problem List   Diagnosis    33 weeks gestation of pregnancy    Prenatal care in third trimester    COVID-19 affecting pregnancy in second trimester    History of  delivery    Back pain in pregnancy    Obesity (BMI 30 0-34  9)       Baby complications/comments: Torres IUP, vertex presentation, anterior placenta with visible abruption on lower edge    Review of Systems    OB History    Para Term  AB Living   2 1 1     1   SAB TAB Ectopic Multiple Live Births           1      # Outcome Date GA Lbr Kt/2nd Weight Sex Delivery Anes PTL Lv   2 Current            1 Term 14 42w0d  4082 g (9 lb) F CS-LTranv Spinal N АНДРЕЙ      Complications: Failure to Progress in Second Stage       No past medical history on file  Past Surgical History:   Procedure Laterality Date     SECTION         Social History     Tobacco Use    Smoking status: Never Smoker    Smokeless tobacco: Never Used   Substance Use Topics    Alcohol use: Never       No Known Allergies    Medications Prior to Admission   Medication    Ascorbic Acid (vitamin C) 1000 MG tablet    cholecalciferol (VITAMIN D3) 1,000 units tablet    Multiple Vitamin (multivitamin) tablet           OBJECTIVE:  Vitals:  Temp:  [96 6 °F (35 9 °C)-97 7 °F (36 5 °C)] 97 7 °F (36 5 °C)  HR:  [101-105] 101  Resp:  [20] 20  BP: (110-118)/(58-80) 110/80  Body mass index is 33 01 kg/m²       Physical Exam:  Constitutional: AAOx3  CV: +S1, +S2, no murmurs appreciated  Resp: No respiratory distress  Abdominal: Gravid uterus, vaginal bleeding  Psychiatric: Behavioral normal    FHT:  Baseline Rate: 135 bpm  Variability: Moderate 6-25 bpm  Accelerations: 15 x 15 or greater, At variable times  Decelerations: None  FHR Category: Category I    TOCO:   Contraction Frequency (minutes): 7-8  Contraction Duration (seconds):   Contraction Quality: Mild      Lab Results   Component Value Date    WBC 20 08 (H) 07/15/2021    HGB 10 1 (L) 07/15/2021    HCT 31 1 (L) 07/15/2021     07/15/2021     Lab Results   Component Value Date    K 3 8 07/15/2021     07/15/2021    CO2 17 (L) 07/15/2021    BUN 7 07/15/2021    CREATININE 0 71 07/15/2021    AST 15 07/15/2021    ALT 16 07/15/2021       Prenatal Labs:   Blood Type:   Lab Results   Component Value Date/Time    ABO Grouping A 07/15/2021 01:50 PM     , D (Rh type):   Lab Results   Component Value Date/Time    Rh Factor Positive 07/15/2021 01:50 PM     , HCT/HGB:   Lab Results   Component Value Date/Time    Hematocrit 31 1 (L) 07/15/2021 01:50 PM    Hemoglobin 10 1 (L) 07/15/2021 01:50 PM      , MCV:   Lab Results   Component Value Date/Time    MCV 85 07/15/2021 01:50 PM      , Platelets:   Lab Results   Component Value Date/Time    Platelets 584 13/45/3503 01:50 PM      , 1 hour Glucola:   Lab Results   Component Value Date/Time    Glucose 125 06/29/2021 11:56 AM   , Chlamydia: No results found for: EXTCHLAMYDIA  , Gonorrhea:   Lab Results   Component Value Date/Time    N gonorrhoeae, DNA Probe Negative 05/10/2021 08:57 AM     , Group B Strep:  No results found for: Juani Johnson MD  OB/GYN PGY-1  7/15/2021  6:24 PM        Portions of the record may have been created with voice recognition software  Occasional wrong word or "sound a like" substitutions may have occurred due to the inherent limitations of voice recognition software    Read the chart carefully and recognize, using context, where substitutions have occurred

## 2021-07-15 NOTE — PLAN OF CARE
Problem: PAIN - ADULT  Goal: Verbalizes/displays adequate comfort level or baseline comfort level  Description: Interventions:  - Encourage patient to monitor pain and request assistance  - Assess pain using appropriate pain scale  - Administer analgesics based on type and severity of pain and evaluate response  - Implement non-pharmacological measures as appropriate and evaluate response  - Consider cultural and social influences on pain and pain management  - Notify physician/advanced practitioner if interventions unsuccessful or patient reports new pain  Outcome: Progressing     Problem: INFECTION - ADULT  Goal: Absence or prevention of progression during hospitalization  Description: INTERVENTIONS:  - Assess and monitor for signs and symptoms of infection  - Monitor lab/diagnostic results  - Monitor all insertion sites, i e  indwelling lines, tubes, and drains  - Monitor endotracheal if appropriate and nasal secretions for changes in amount and color  - Majestic appropriate cooling/warming therapies per order  - Administer medications as ordered  - Instruct and encourage patient and family to use good hand hygiene technique  - Identify and instruct in appropriate isolation precautions for identified infection/condition  Outcome: Progressing  Goal: Absence of fever/infection during neutropenic period  Description: INTERVENTIONS:  - Monitor WBC    Outcome: Progressing     Problem: SAFETY ADULT  Goal: Patient will remain free of falls  Description: INTERVENTIONS:  - Educate patient/family on patient safety including physical limitations  - Instruct patient to call for assistance with activity   - Consult OT/PT to assist with strengthening/mobility   - Keep Call bell within reach  - Keep bed low and locked with side rails adjusted as appropriate  - Keep care items and personal belongings within reach  - Initiate and maintain comfort rounds  - Apply yellow socks and bracelet for high fall risk patients  - Consider moving patient to room near nurses station  Outcome: Progressing  Goal: Maintain or return to baseline ADL function  Description: INTERVENTIONS:  -  Assess patient's ability to carry out ADLs; assess patient's baseline for ADL function and identify physical deficits which impact ability to perform ADLs (bathing, care of mouth/teeth, toileting, grooming, dressing, etc )  - Assess/evaluate cause of self-care deficits   - Assess range of motion  - Assess patient's mobility; develop plan if impaired  - Assess patient's need for assistive devices and provide as appropriate  - Encourage maximum independence but intervene and supervise when necessary  - Involve family in performance of ADLs  - Assess for home care needs following discharge   - Consider OT consult to assist with ADL evaluation and planning for discharge  - Provide patient education as appropriate  Outcome: Progressing  Goal: Maintains/Returns to pre admission functional level  Description: INTERVENTIONS:  - Perform BMAT or MOVE assessment daily    - Set and communicate daily mobility goal to care team and patient/family/caregiver  - Collaborate with rehabilitation services on mobility goals if consulted  - Record patient progress and toleration of activity level   Outcome: Progressing     Problem: Knowledge Deficit  Goal: Patient/family/caregiver demonstrates understanding of disease process, treatment plan, medications, and discharge instructions  Description: Complete learning assessment and assess knowledge base  Interventions:  - Provide teaching at level of understanding  - Provide teaching via preferred learning methods  Outcome: Progressing  Goal: Verbalizes understanding of labor plan  Description: Assess patient/family/caregiver's baseline knowledge level and ability to understand information  Provide education via patient/family/caregiver's preferred learning method at appropriate level of understanding       1  Provide teaching at level of understanding  2  Provide teaching via preferred learning method(s)  Outcome: Progressing     Problem: DISCHARGE PLANNING  Goal: Discharge to home or other facility with appropriate resources  Description: INTERVENTIONS:  - Identify barriers to discharge w/patient and caregiver  - Arrange for needed discharge resources and transportation as appropriate  - Identify discharge learning needs (meds, wound care, etc )  - Arrange for interpretive services to assist at discharge as needed  - Refer to Case Management Department for coordinating discharge planning if the patient needs post-hospital services based on physician/advanced practitioner order or complex needs related to functional status, cognitive ability, or social support system  Outcome: Progressing     Problem: Labor & Delivery  Goal: Manages discomfort  Description: Assess and monitor for signs and symptoms of discomfort  Assess patient's pain level regularly and per hospital policy  Administer medications as ordered  Support use of nonpharmacological methods to help control pain such as distraction, imagery, relaxation, and application of heat and cold  Collaborate with interdisciplinary team and patient to determine appropriate pain management plan  1  Include patient in decisions related to comfort  2  Offer non-pharmacological pain management interventions  3  Report ineffective pain management to physician  Outcome: Progressing  Goal: Patient vital signs are stable  Description: 1  Assess vital signs - vaginal delivery    Outcome: Progressing

## 2021-07-15 NOTE — CONSULTS
Consultation - [facility]  Elwood Libman de Bertrum Luria 30 y o  female MRN: [id number]  Unit/Bed#: CALI Wilhelm  Encounter: [id number]      Inpatient consult to Perinatology  Consult performed by: Adama Stephens MD  Consult ordered by: Adama Stephens MD          Chief Complaint   Patient presents with    Vaginal Bleeding - Pregnant       History of Present Illness   Physician Requesting Consult: Nadia Winston MD  Reason for Consult / Principal Problem: Placental abruption  Subspeciality: Perinatology    HPI:  Daja Galvan is a 30 y o  [place] Morelia female with an RUEL of 2021, by Last Menstrual Period at 33w5d gestation who is being evaluated for Vaginal bleeding  She was discharged yesterday after a two day admission at [facility] in [facility]  There she reports they told her she had an abruption  She denies placenta or vasa previa  She states that she got corticosteroids in [place], and was instructed to see her OB after discharge and started bleeding before she could follow up  She describes her bleeding as heavy with clots  She denies contractions but states she feel a little bit of pain in her suprapubic area  Abruption visualized on ultrasound  Her current obstetrical history is significant for prior , placental abruption  She transferred care to [address] [place] [address] from [facility] at 24 weeks  She desires delivery by repeat  and signed tubal paperwork in May  Contractions: Frequency: Every 5 minutes, Duration: 45 seconds and Intensity: mild  Leakage of fluid: None  Bleeding: onset: monday and severity: moderate with clots  Fetal movement: present  Review of Systems   Constitutional: Negative  HENT: Negative  Eyes: Negative  Respiratory: Negative  Cardiovascular: Negative  Gastrointestinal: Negative  Endocrine: Negative  Genitourinary: Positive for vaginal bleeding  Musculoskeletal: Negative  Skin: Negative  Neurological: Negative  Psychiatric/Behavioral: Negative  All systems reviewed are negative    PREGNANCY COMPLICATIONS: prior , placental abruption    OB History    Para Term  AB Living   2 1 1     1   SAB TAB Ectopic Multiple Live Births           1      # Outcome Date GA Lbr Kt/2nd Weight Sex Delivery Anes PTL Lv   2 Current            1 Term 14 42w0d  4082 g (9 lb) F CS-LTranv Spinal N АНДРЕЙ      Complications: Failure to Progress in Second Stage       Baby complications/comments: Torres IUP, vertex presentation, anterior placenta with visible abruption on lower edge    Historical Information   No past medical history on file  Past Surgical History:   Procedure Laterality Date     SECTION       Social History   Social History     Substance and Sexual Activity   Alcohol Use Never     Social History     Substance and Sexual Activity   Drug Use Never     Social History     Tobacco Use   Smoking Status Never Smoker   Smokeless Tobacco Never Used     Family History: non-contributory    Meds/Allergies      Medications Prior to Admission   Medication    Ascorbic Acid (vitamin C) 1000 MG tablet    cholecalciferol (VITAMIN D3) 1,000 units tablet    Multiple Vitamin (multivitamin) tablet      No Known Allergies    OBJECTIVE:    Vitals: Blood pressure 118/75, pulse 103, temperature (!) 96 6 °F (35 9 °C), temperature source Temporal, resp  rate 20, last menstrual period 2020  There is no height or weight on file to calculate BMI  Physical Exam  Constitutional:       General: She is not in acute distress  Appearance: She is well-developed  She is not diaphoretic  HENT:      Head: Normocephalic and atraumatic  Eyes:      Pupils: Pupils are equal, round, and reactive to light  Neck:      Trachea: No tracheal deviation  Cardiovascular:      Rate and Rhythm: Normal rate and regular rhythm  Heart sounds: Normal heart sounds  No murmur heard     No friction rub  No gallop  Pulmonary:      Effort: Pulmonary effort is normal  No respiratory distress  Breath sounds: Normal breath sounds  No stridor  No wheezing or rales  Abdominal:      General: There is no distension  Palpations: Abdomen is soft  There is no mass  Tenderness: There is no abdominal tenderness  There is no guarding or rebound  Comments: Gravid   Genitourinary:     Cervix: Cervical bleeding (active from os) present  Musculoskeletal:         General: No tenderness or deformity  Normal range of motion  Cervical back: Normal range of motion  Skin:     General: Skin is warm and dry  Coloration: Skin is not pale  Findings: No erythema or rash  Neurological:      Mental Status: She is alert and oriented to person, place, and time        Coordination: Coordination normal    Psychiatric:         Mood and Affect: Mood normal          Behavior: Behavior normal          Fetal heart rate: Baseline: 150 bpm, Variability: Moderate 6 - 25 bpm, Accelerations: Reactive and Decelerations: Absent  Virgilina: irregular, every 5-8 minutes    Prenatal Labs:   Blood Type:   Lab Results   Component Value Date/Time    ABO Grouping A 07/15/2021 01:50 PM     , D (Rh type):   Lab Results   Component Value Date/Time    Rh Factor Positive 07/15/2021 01:50 PM     , Antibody Screen: No results found for: ANTIBODYSCR , HCT/HGB:   Lab Results   Component Value Date/Time    Hematocrit 31 1 (L) 07/15/2021 01:50 PM    Hemoglobin 10 1 (L) 07/15/2021 01:50 PM      , MCV:   Lab Results   Component Value Date/Time    MCV 85 07/15/2021 01:50 PM      , Platelets:   Lab Results   Component Value Date/Time    Platelets 639 16/32/8202 01:50 PM      , 1 hour Glucola:   Lab Results   Component Value Date/Time    Glucose 125 06/29/2021 11:56 AM   , 3 hour GTT: No results found for: [id number], Varicella: No results found for: VARICELLAIGG    , Rubella:   Lab Results   Component Value Date/Time    Rubella IgG Quant >175 0 05/10/2021 09:30 AM        , VDRL/RPR:   Lab Results   Component Value Date/Time    RPR Non-Reactive 06/29/2021 11:56 AM      , Urine Culture/Screen:   Lab Results   Component Value Date/Time    Urine Culture No Growth <1000 cfu/mL 05/10/2021 09:30 AM       , Urine Drug Screen: No results found for: AMPHETUR, BARBTUR, BDZUR, THCUR, COCAINEUR, METHADONEUR, OPIATEUR, PCPUR, MTHAMUR, ECSTASYUR, TRICYCLICSUR, Hep B:   Lab Results   Component Value Date/Time    Hepatitis B Surface Ag Non-reactive 05/10/2021 09:30 AM     , Hep C: No components found for: HEPCSAG, EXTHEPCSAG   , HIV:   Lab Results   Component Value Date/Time    HIV-1/HIV-2 Ab Non-Reactive 05/10/2021 09:30 AM     , Chlamydia: No results found for: EXTCHLAMYDIA  , Gonorrhea:   Lab Results   Component Value Date/Time    N gonorrhoeae, DNA Probe Negative 05/10/2021 08:57 AM     , Group B Strep:  No results found for: STREPGRPB               Assessment/Plan     ASSESSMENT:   30 y o  Barnetta Age  with IUP at 33w5d  gestation with placental abruption    PLAN:   Admit for obs   Current labs   No need to repeat corticosteroids at this time   Continuous EFM   CLD   Formal US tomorrow   Dual IV access    Rich Menjivar MD  7/15/2021  2:50 PM

## 2021-07-16 PROBLEM — O46.93 VAGINAL BLEEDING IN PREGNANCY, THIRD TRIMESTER: Status: ACTIVE | Noted: 2021-07-16

## 2021-07-16 LAB
AMPHETAMINES SERPL QL SCN: NEGATIVE
APTT PPP: 21 SECONDS (ref 23–37)
APTT PPP: 22 SECONDS (ref 23–37)
APTT PPP: 23 SECONDS (ref 23–37)
BARBITURATES UR QL: NEGATIVE
BENZODIAZ UR QL: NEGATIVE
COCAINE UR QL: NEGATIVE
ERYTHROCYTE [DISTWIDTH] IN BLOOD BY AUTOMATED COUNT: 16.5 % (ref 11.6–15.1)
ERYTHROCYTE [DISTWIDTH] IN BLOOD BY AUTOMATED COUNT: 16.8 % (ref 11.6–15.1)
FIBRINOGEN PPP-MCNC: 323 MG/DL (ref 227–495)
FIBRINOGEN PPP-MCNC: 351 MG/DL (ref 227–495)
HCT VFR BLD AUTO: 29.5 % (ref 34.8–46.1)
HCT VFR BLD AUTO: 29.6 % (ref 34.8–46.1)
HGB BLD-MCNC: 9.3 G/DL (ref 11.5–15.4)
HGB BLD-MCNC: 9.6 G/DL (ref 11.5–15.4)
INR PPP: 1.02 (ref 0.84–1.19)
INR PPP: 1.02 (ref 0.84–1.19)
MCH RBC QN AUTO: 27.1 PG (ref 26.8–34.3)
MCH RBC QN AUTO: 27.5 PG (ref 26.8–34.3)
MCHC RBC AUTO-ENTMCNC: 31.5 G/DL (ref 31.4–37.4)
MCHC RBC AUTO-ENTMCNC: 32.4 G/DL (ref 31.4–37.4)
MCV RBC AUTO: 85 FL (ref 82–98)
MCV RBC AUTO: 86 FL (ref 82–98)
METHADONE UR QL: NEGATIVE
OPIATES UR QL SCN: NEGATIVE
OXYCODONE+OXYMORPHONE UR QL SCN: NEGATIVE
PCP UR QL: NEGATIVE
PLATELET # BLD AUTO: 238 THOUSANDS/UL (ref 149–390)
PLATELET # BLD AUTO: 238 THOUSANDS/UL (ref 149–390)
PLATELET # BLD AUTO: 240 THOUSANDS/UL (ref 149–390)
PMV BLD AUTO: 10.8 FL (ref 8.9–12.7)
PMV BLD AUTO: 11 FL (ref 8.9–12.7)
PMV BLD AUTO: 11 FL (ref 8.9–12.7)
PROTHROMBIN TIME: 13.5 SECONDS (ref 11.6–14.5)
PROTHROMBIN TIME: 13.5 SECONDS (ref 11.6–14.5)
PROTHROMBIN TIME: 13.6 SECONDS (ref 11.6–14.5)
RBC # BLD AUTO: 3.43 MILLION/UL (ref 3.81–5.12)
RBC # BLD AUTO: 3.49 MILLION/UL (ref 3.81–5.12)
RPR SER QL: NORMAL
THC UR QL: NEGATIVE
THROMBIN TIME: 17.3 SECONDS (ref 14.7–18.4)
WBC # BLD AUTO: 11.5 THOUSAND/UL (ref 4.31–10.16)
WBC # BLD AUTO: 14.11 THOUSAND/UL (ref 4.31–10.16)

## 2021-07-16 PROCEDURE — 85384 FIBRINOGEN ACTIVITY: CPT | Performed by: OBSTETRICS & GYNECOLOGY

## 2021-07-16 PROCEDURE — 85610 PROTHROMBIN TIME: CPT | Performed by: OBSTETRICS & GYNECOLOGY

## 2021-07-16 PROCEDURE — 85730 THROMBOPLASTIN TIME PARTIAL: CPT | Performed by: OBSTETRICS & GYNECOLOGY

## 2021-07-16 PROCEDURE — 85027 COMPLETE CBC AUTOMATED: CPT | Performed by: OBSTETRICS & GYNECOLOGY

## 2021-07-16 PROCEDURE — 80307 DRUG TEST PRSMV CHEM ANLYZR: CPT | Performed by: OBSTETRICS & GYNECOLOGY

## 2021-07-16 PROCEDURE — 85732 THROMBOPLASTIN TIME PARTIAL: CPT | Performed by: OBSTETRICS & GYNECOLOGY

## 2021-07-16 PROCEDURE — 85611 PROTHROMBIN TEST: CPT | Performed by: OBSTETRICS & GYNECOLOGY

## 2021-07-16 PROCEDURE — 76821 MIDDLE CEREBRAL ARTERY ECHO: CPT | Performed by: OBSTETRICS & GYNECOLOGY

## 2021-07-16 PROCEDURE — 99233 SBSQ HOSP IP/OBS HIGH 50: CPT | Performed by: OBSTETRICS & GYNECOLOGY

## 2021-07-16 PROCEDURE — 76816 OB US FOLLOW-UP PER FETUS: CPT | Performed by: OBSTETRICS & GYNECOLOGY

## 2021-07-16 PROCEDURE — 82728 ASSAY OF FERRITIN: CPT | Performed by: OBSTETRICS & GYNECOLOGY

## 2021-07-16 PROCEDURE — 83540 ASSAY OF IRON: CPT | Performed by: OBSTETRICS & GYNECOLOGY

## 2021-07-16 PROCEDURE — 59025 FETAL NON-STRESS TEST: CPT | Performed by: OBSTETRICS & GYNECOLOGY

## 2021-07-16 PROCEDURE — 85670 THROMBIN TIME PLASMA: CPT | Performed by: OBSTETRICS & GYNECOLOGY

## 2021-07-16 PROCEDURE — 83550 IRON BINDING TEST: CPT | Performed by: OBSTETRICS & GYNECOLOGY

## 2021-07-16 PROCEDURE — 85049 AUTOMATED PLATELET COUNT: CPT | Performed by: OBSTETRICS & GYNECOLOGY

## 2021-07-16 PROCEDURE — NC001 PR NO CHARGE: Performed by: OBSTETRICS & GYNECOLOGY

## 2021-07-16 RX ADMIN — IRON SUCROSE 300 MG: 20 INJECTION, SOLUTION INTRAVENOUS at 09:45

## 2021-07-16 NOTE — PLAN OF CARE
Problem: PAIN - ADULT  Goal: Verbalizes/displays adequate comfort level or baseline comfort level  Description: Interventions:  - Encourage patient to monitor pain and request assistance  - Assess pain using appropriate pain scale  - Administer analgesics based on type and severity of pain and evaluate response  - Implement non-pharmacological measures as appropriate and evaluate response  - Consider cultural and social influences on pain and pain management  - Notify physician/advanced practitioner if interventions unsuccessful or patient reports new pain  Outcome: Progressing     Problem: INFECTION - ADULT  Goal: Absence or prevention of progression during hospitalization  Description: INTERVENTIONS:  - Assess and monitor for signs and symptoms of infection  - Monitor lab/diagnostic results  - Monitor all insertion sites, i e  indwelling lines, tubes, and drains  - Monitor endotracheal if appropriate and nasal secretions for changes in amount and color  - [place] appropriate cooling/warming therapies per order  - Administer medications as ordered  - Instruct and encourage patient and family to use good hand hygiene technique  - Identify and instruct in appropriate isolation precautions for identified infection/condition  Outcome: Progressing  Goal: Absence of fever/infection during neutropenic period  Description: INTERVENTIONS:  - Monitor WBC    Outcome: Progressing     Problem: SAFETY ADULT  Goal: Patient will remain free of falls  Description: INTERVENTIONS:  - Educate patient/family on patient safety including physical limitations  - Instruct patient to call for assistance with activity   - Consult OT/PT to assist with strengthening/mobility   - Keep Call bell within reach  - Keep bed low and locked with side rails adjusted as appropriate  - Keep care items and personal belongings within reach  - Initiate and maintain comfort rounds  Problem: PAIN - ADULT  Goal: Verbalizes/displays adequate comfort level or baseline comfort level  Description: Interventions:  - Encourage patient to monitor pain and request assistance  - Assess pain using appropriate pain scale  - Administer analgesics based on type and severity of pain and evaluate response  - Implement non-pharmacological measures as appropriate and evaluate response  - Consider cultural and social influences on pain and pain management  - Notify physician/advanced practitioner if interventions unsuccessful or patient reports new pain  Outcome: Progressing     Problem: INFECTION - ADULT  Goal: Absence or prevention of progression during hospitalization  Description: INTERVENTIONS:  - Assess and monitor for signs and symptoms of infection  - Monitor lab/diagnostic results  - Monitor all insertion sites, i e  indwelling lines, tubes, and drains  - Monitor endotracheal if appropriate and nasal secretions for changes in amount and color  - [place] appropriate cooling/warming therapies per order  - Administer medications as ordered  - Instruct and encourage patient and family to use good hand hygiene technique  - Identify and instruct in appropriate isolation precautions for identified infection/condition  Outcome: Progressing  Goal: Absence of fever/infection during neutropenic period  Description: INTERVENTIONS:  - Monitor WBC    Outcome: Progressing     Problem: SAFETY ADULT  Goal: Patient will remain free of falls  Description: INTERVENTIONS:  - Educate patient/family on patient safety including physical limitations  - Instruct patient to call for assistance with activity   - Consult OT/PT to assist with strengthening/mobility   - Keep Call bell within reach  - Keep bed low and locked with side rails adjusted as appropriate  - Keep care items and personal belongings within reach  - Initiate and maintain comfort rounds  - Apply yellow socks and bracelet for high fall risk patients  - Consider moving patient to room near nurses station  Outcome: Progressing  Goal: Maintain or return to baseline ADL function  Description: INTERVENTIONS:  -  Assess patient's ability to carry out ADLs; assess patient's baseline for ADL function and identify physical deficits which impact ability to perform ADLs (bathing, care of mouth/teeth, toileting, grooming, dressing, etc )  - Assess/evaluate cause of self-care deficits   - Assess range of motion  - Assess patient's mobility; develop plan if impaired  - Assess patient's need for assistive devices and provide as appropriate  - Encourage maximum independence but intervene and supervise when necessary  - Involve family in performance of ADLs  - Assess for home care needs following discharge   - Consider OT consult to assist with ADL evaluation and planning for discharge  - Provide patient education as appropriate  Outcome: Progressing  Goal: Maintains/Returns to pre admission functional level  Description: INTERVENTIONS:  - Perform BMAT or MOVE assessment daily    - Set and communicate daily mobility goal to care team and patient/family/caregiver  - Collaborate with rehabilitation services on mobility goals if consulte  - Out of bed for toileting  - Record patient progress and toleration of activity level   Outcome: Progressing     Problem: Knowledge Deficit  Goal: Patient/family/caregiver demonstrates understanding of disease process, treatment plan, medications, and discharge instructions  Description: Complete learning assessment and assess knowledge base  Interventions:  - Provide teaching at level of understanding  - Provide teaching via preferred learning methods  Outcome: Progressing  Goal: Verbalizes understanding of labor plan  Description: Assess patient/family/caregiver's baseline knowledge level and ability to understand information  Provide education via patient/family/caregiver's preferred learning method at appropriate level of understanding  1  Provide teaching at level of understanding    2  Provide teaching via preferred learning method(s)  Outcome: Progressing     Problem: DISCHARGE PLANNING  Goal: Discharge to home or other facility with appropriate resources  Description: INTERVENTIONS:  - Identify barriers to discharge w/patient and caregiver  - Arrange for needed discharge resources and transportation as appropriate  - Identify discharge learning needs (meds, wound care, etc )  - Arrange for interpretive services to assist at discharge as needed  - Refer to Case Management Department for coordinating discharge planning if the patient needs post-hospital services based on physician/advanced practitioner order or complex needs related to functional status, cognitive ability, or social support system  Outcome: Progressing     Problem: Labor & Delivery  Goal: Manages discomfort  Description: Assess and monitor for signs and symptoms of discomfort  Assess patient's pain level regularly and per hospital policy  Administer medications as ordered  Support use of nonpharmacological methods to help control pain such as distraction, imagery, relaxation, and application of heat and cold  Collaborate with interdisciplinary team and patient to determine appropriate pain management plan  1  Include patient in decisions related to comfort  2  Offer non-pharmacological pain management interventions  3  Report ineffective pain management to physician  Outcome: Progressing  Goal: Patient vital signs are stable  Description: 1  Assess vital signs - vaginal delivery    Outcome: Progressing     - Apply yellow socks and bracelet for high fall risk patients  - Consider moving patient to room near nurses station  Outcome: Progressing  Goal: Maintain or return to baseline ADL function  Description: INTERVENTIONS:  -  Assess patient's ability to carry out ADLs; assess patient's baseline for ADL function and identify physical deficits which impact ability to perform ADLs (bathing, care of mouth/teeth, toileting, grooming, dressing, etc )  - Assess/evaluate cause of self-care deficits   - Assess range of motion  - Assess patient's mobility; develop plan if impaired  - Assess patient's need for assistive devices and provide as appropriate  - Encourage maximum independence but intervene and supervise when necessary  - Involve family in performance of ADLs  - Assess for home care needs following discharge   - Consider OT consult to assist with ADL evaluation and planning for discharge  - Provide patient education as appropriate  Outcome: Progressing  Goal: Maintains/Returns to pre admission functional level  Description: INTERVENTIONS:  - Perform BMAT or MOVE assessment daily    - Set and communicate daily mobility goal to care team and patient/family/caregiver  - Collaborate with rehabilitation services on mobility goals if consulted  - Out of bed for toileting  - Record patient progress and toleration of activity level   Outcome: Progressing     Problem: Knowledge Deficit  Goal: Patient/family/caregiver demonstrates understanding of disease process, treatment plan, medications, and discharge instructions  Description: Complete learning assessment and assess knowledge base  Interventions:  - Provide teaching at level of understanding  - Provide teaching via preferred learning methods  Outcome: Progressing  Goal: Verbalizes understanding of labor plan  Description: Assess patient/family/caregiver's baseline knowledge level and ability to understand information  Provide education via patient/family/caregiver's preferred learning method at appropriate level of understanding  1  Provide teaching at level of understanding  2  Provide teaching via preferred learning method(s)    Outcome: Progressing     Problem: DISCHARGE PLANNING  Goal: Discharge to home or other facility with appropriate resources  Description: INTERVENTIONS:  - Identify barriers to discharge w/patient and caregiver  - Arrange for needed discharge resources and transportation as appropriate  - Identify discharge learning needs (meds, wound care, etc )  - Arrange for interpretive services to assist at discharge as needed  - Refer to Case Management Department for coordinating discharge planning if the patient needs post-hospital services based on physician/advanced practitioner order or complex needs related to functional status, cognitive ability, or social support system  Outcome: Progressing     Problem: Labor & Delivery  Goal: Manages discomfort  Description: Assess and monitor for signs and symptoms of discomfort  Assess patient's pain level regularly and per hospital policy  Administer medications as ordered  Support use of nonpharmacological methods to help control pain such as distraction, imagery, relaxation, and application of heat and cold  Collaborate with interdisciplinary team and patient to determine appropriate pain management plan  1  Include patient in decisions related to comfort  2  Offer non-pharmacological pain management interventions  3  Report ineffective pain management to physician  Outcome: Progressing  Goal: Patient vital signs are stable  Description: 1  Assess vital signs - vaginal delivery    Outcome: Progressing

## 2021-07-16 NOTE — PROGRESS NOTES
Progress Note - Maternal Fetal Medicine   [facility] Andry Chong 30 y o  female MRN: [id number]  Unit/Bed#: -01 Encounter: [id number]    Assessment:  30 y o  Tonya Mendoza at [address] admitted with bleeding in the setting of an abruption  Hospital day 2    Plan:  1 ) Abruption   -Pad counts   -Fibrinogen: 383->369->323   -Hgb 10 1->9 9->9 3   -KB negative; PT/PTT wnl and stable   -WBC 20 08->16 81->14 11   -UDS: negative   -Blood Type: A pos    2) IUP @ 33w   - CEFM   -s/p BTM at [facility] 45 scan today   -GBS collected and pending    3)FEN   -CLD     4) DVT PPx:    SCD's    Subjective/Objective   Chief Complaint:   Patient reports contractions overnight, that were not painful  She denies any bleeding overnight  She denies any leakage of fluid and denies any signs of infection  Subjective:     Contractions: yes, intermittently throughout the night  Loss of fluid: no  Vaginal bleeding: small dime size of brown discharge on pad  Fetal movement: no    Pain: no  Tolerating PO: yes  Voiding: yes  Ambulating: yes  Headaches: no  Visual changes: no  Chest pain: no  Shortness of breath: no  Nausea: no  Vomiting/Diarrhea: no  Dysuria: no  Leg pain: no    Objective:     Vitals:   Temp:  [96 6 °F (35 9 °C)-98 2 °F (36 8 °C)] 98 2 °F (36 8 °C)  HR:  [] 95  Resp:  [20] 20  BP: (102-118)/(58-80) 102/62       Physical Exam  Vitals reviewed  Constitutional:       Appearance: Normal appearance  HENT:      Head: Normocephalic and atraumatic  Cardiovascular:      Rate and Rhythm: Normal rate and regular rhythm  Heart sounds: No murmur heard  No friction rub  No gallop  Pulmonary:      Effort: Pulmonary effort is normal  No respiratory distress  Breath sounds: No wheezing  Abdominal:      Palpations: Abdomen is soft  Tenderness: There is no abdominal tenderness  Comments: gravid   Skin:     General: Skin is warm and dry     Neurological:      Mental Status: She is alert and oriented to person, place, and time  Psychiatric:         Mood and Affect: Mood normal            Fetal Assessment:  FHT: 140 / Moderate 6 - 25 bpm / accels, reactive  [place]: irregular contractions    Lab, Imaging and other studies: I have personally reviewed pertinent reports        Lab Results   Component Value Date    WBC 14 11 (H) 07/16/2021    HGB 9 3 (L) 07/16/2021    HCT 29 5 (L) 07/16/2021    MCV 86 07/16/2021     07/16/2021       Lab Results   Component Value Date    CALCIUM 8 9 07/15/2021    K 3 8 07/15/2021    CO2 17 (L) 07/15/2021     07/15/2021    BUN 7 07/15/2021    CREATININE 0 71 07/15/2021       Lab Results   Component Value Date    ALT 16 07/15/2021    AST 15 07/15/2021    ALKPHOS 132 (H) 07/15/2021       Veronica Mendoza MD  OBGYN, PGY-3  7/16/2021  6:41 AM

## 2021-07-16 NOTE — UTILIZATION REVIEW
Initial Clinical Review    OBS 07-15-21 @ 1342 CONVERTED TO INPATIENT ADMISSION 07-16-21 @ 0811 FOR CONTINUATION OF CARE @ 33 5/7 WEEKS GESTATION FOR VAGINAL BLEEDING    Inpatient Admission Once     Transfer Service: OB/GYN       Question Answer Comment   Level of Care Med Surg    Estimated length of stay More than 2 Midnights    Certification I certify that inpatient services are medically necessary for this patient for a duration of greater than two midnights  See H&P and MD Progress Notes for additional information about the patient's course of treatment  07/16/21 0811     Admission: Date/Time/Statement:   Admission Orders (From admission, onward)     Ordered        07/16/21 0811  Inpatient Admission  Once         07/15/21 1342  Place in Observation  Once                   Orders Placed This Encounter   Procedures    Place in Observation     Standing Status:   Standing     Number of Occurrences:   1     Order Specific Question:   Level of Care     Answer:   Med Surg [16]    Inpatient Admission     Standing Status:   Standing     Number of Occurrences:   1     Order Specific Question:   Level of Care     Answer:   Med Surg [16]     Order Specific Question:   Estimated length of stay     Answer:   More than 2 Midnights     Order Specific Question:   Certification     Answer:   I certify that inpatient services are medically necessary for this patient for a duration of greater than two midnights  See H&P and MD Progress Notes for additional information about the patient's course of treatment  Initial Presentation:  31 yo G 2 P 1 @ 33 5/7 weeks gestation presented to L&D as observation for vaginal bleeding On US chronic abruption found  Patient states bleeding heavy with clots and a little pain around suprapubic area  She recently transferred care to [facility] from [facility] at 24 weeks  (+) BTM  X 1 course   Plan NST TID,SCD  FHT:  Baseline Rate: 135 bpm  Variability: Moderate 6-25 bpm  Accelerations: 15 x 15 or greater, At variable times  Decelerations: None  FHR Category: Category I     TOCO:   Contraction Frequency (minutes): 7-8  Contraction Duration (seconds):   Contraction Quality: Mild       Date: 07-16-21  Day 2: Converted to inpatient for vaginal bleeding @ 33 6/7 wks   As Per [facility] ultrasound finding overnight of a hypoechoic retroplacental collection, clinical suspicion for chronic abruption is high  She has mild anemia that is likely related to blood loss, without coagulopathy  Will continue to trend labs closely every 12 hours, and give venofer today  Maintain 2 Ivs and active type & screen at all times  Given reassuring fetal tracing and lack of bleeding, she does appear stable enough to transition to a regular diet and intermittent fetal monitoring today   She will remain in house for the forseeable future at this time given high chance of progression and need for delivery   (+) contractions overnight fetal heart tone reactive  continuous external monitoring     ADMITTING  Vitals   Temperature Pulse Respirations Blood Pressure SpO2   07/15/21 1221 07/15/21 1221 07/15/21 1221 07/15/21 1221 07/16/21 0900   (!) 96 6 °F (35 9 °C) 105 20 110/58 98 %      Temp Source Heart Rate Source Patient Position - Orthostatic VS BP Location FiO2 (%)   07/15/21 1221 07/15/21 1221 -- 07/15/21 1221 --   Temporal Monitor  Right arm       Pain Score       07/15/21 1221       No Pain          Wt Readings from Last 1 Encounters:   07/15/21 76 7 kg (169 lb)     Additional Vital Signs:   Date/Time  Temp  Pulse  Resp  BP  SpO2  O2 Device  Cardiac (WDL)   07/16/21 0900  97 2 °F (36 2 °C)Abnormal   88  18  107/59  98 %  None (Room air)  WDL   07/16/21 0027  98 2 °F (36 8 °C)  95  20  102/62  --  --  --   07/15/21 1925  --  --  --  --  --  --  WDL   Comment rows:   OBSERV: pt offers no complaints at this time, she reports + fetal movement at 07/15/21 1925   07/15/21 1542  --  --  --  --  --  --  WDL 07/15/21 1503  97 7 °F (36 5 °C)  101  20  110/80  --  --  --   07/15/21 1400  --  103  --  118/75  --  --  --       Pertinent Labs/Diagnostic Test Results:       Results from last 7 days   Lab Units 07/16/21  0539 07/15/21  2048 07/15/21  1350   WBC Thousand/uL 14 11* 16 81* 20 08*   HEMOGLOBIN g/dL 9 3* 9 9* 10 1*   HEMATOCRIT % 29 5* 30 4* 31 1*   PLATELETS Thousands/uL 240 254  254 273   BANDS PCT %  --   --  1         Results from last 7 days   Lab Units 07/15/21  1350   SODIUM mmol/L 133*   POTASSIUM mmol/L 3 8   CHLORIDE mmol/L 102   CO2 mmol/L 17*   ANION GAP mmol/L 14*   BUN mg/dL 7   CREATININE mg/dL 0 71   EGFR ml/min/1 73sq m 111   CALCIUM mg/dL 8 9     Results from last 7 days   Lab Units 07/15/21  1350   AST U/L 15   ALT U/L 16   ALK PHOS U/L 132*   TOTAL PROTEIN g/dL 6 5   ALBUMIN g/dL 2 5*   TOTAL BILIRUBIN mg/dL 0 25         Results from last 7 days   Lab Units 07/15/21  1350   GLUCOSE RANDOM mg/dL 134     Results from last 7 days   Lab Units 07/16/21  0539 07/15/21  2048 07/15/21  1350   PROTIME seconds 13 5 13 5  13 6 13 4   INR  1 02 1 03 1 01   PTT seconds 22* 21*  21* 20*     Results from last 7 days   Lab Units 07/16/21  0557   AMPH/METH  Negative   BARBITURATE UR  Negative   BENZODIAZEPINE UR  Negative   COCAINE UR  Negative   METHADONE URINE  Negative   OPIATE UR  Negative   PCP UR  Negative   THC UR  Negative     Results from last 7 days   Lab Units 07/15/21  1350   TOTAL COUNTED  100       No past medical history on file  Present on Admission:  **None**      Admitting Diagnosis: 33 weeks gestation of pregnancy [Z3A 33]  Age/Sex: 30 y o  female  Admission Orders:  Scheduled Medications:  No current facility-administered medications for this encounter  Continuous IV Infusions:  No current facility-administered medications for this encounter  PRN Meds:  No current facility-administered medications for this encounter        IP CONSULT TO PERINATOLOGY   NST TID   SCD      Network Utilization Review Department  ATTENTION: Please call with any questions or concerns to [phone] and carefully listen to the prompts so that you are directed to the right person  All voicemails are confidential   Rhona Schmitzider all requests for admission clinical reviews, approved or denied determinations and any other requests to dedicated fax number below belonging to the campus where the patient is receiving treatment   List of dedicated fax numbers for the Facilities:  [address] DENIALS (Administrative/Medical Necessity) [phone]   [address] (Maternity/NICU/Pediatrics) [phone]   [address] [place] [id number] [place] [address] [address] Erazo [address] [place] [address] [phone]

## 2021-07-17 LAB
APTT PPP: 24 SECONDS (ref 23–37)
BASOPHILS # BLD AUTO: 0.03 THOUSANDS/ΜL (ref 0–0.1)
BASOPHILS # BLD AUTO: 0.03 THOUSANDS/ΜL (ref 0–0.1)
BASOPHILS NFR BLD AUTO: 0 % (ref 0–1)
BASOPHILS NFR BLD AUTO: 0 % (ref 0–1)
EOSINOPHIL # BLD AUTO: 0.06 THOUSAND/ΜL (ref 0–0.61)
EOSINOPHIL # BLD AUTO: 0.09 THOUSAND/ΜL (ref 0–0.61)
EOSINOPHIL NFR BLD AUTO: 1 % (ref 0–6)
EOSINOPHIL NFR BLD AUTO: 1 % (ref 0–6)
ERYTHROCYTE [DISTWIDTH] IN BLOOD BY AUTOMATED COUNT: 16.7 % (ref 11.6–15.1)
ERYTHROCYTE [DISTWIDTH] IN BLOOD BY AUTOMATED COUNT: 16.8 % (ref 11.6–15.1)
FERRITIN SERPL-MCNC: 17 NG/ML (ref 8–388)
GP B STREP DNA SPEC QL NAA+PROBE: POSITIVE
HCT VFR BLD AUTO: 31 % (ref 34.8–46.1)
HCT VFR BLD AUTO: 31.6 % (ref 34.8–46.1)
HGB BLD-MCNC: 10.2 G/DL (ref 11.5–15.4)
HGB BLD-MCNC: 10.2 G/DL (ref 11.5–15.4)
IMM GRANULOCYTES # BLD AUTO: 0.26 THOUSAND/UL (ref 0–0.2)
IMM GRANULOCYTES # BLD AUTO: 0.28 THOUSAND/UL (ref 0–0.2)
IMM GRANULOCYTES NFR BLD AUTO: 2 % (ref 0–2)
IMM GRANULOCYTES NFR BLD AUTO: 2 % (ref 0–2)
IRON SATN MFR SERPL: 93 %
IRON SERPL-MCNC: 457 UG/DL (ref 50–170)
LYMPHOCYTES # BLD AUTO: 3.14 THOUSANDS/ΜL (ref 0.6–4.47)
LYMPHOCYTES # BLD AUTO: 3.53 THOUSANDS/ΜL (ref 0.6–4.47)
LYMPHOCYTES NFR BLD AUTO: 27 % (ref 14–44)
LYMPHOCYTES NFR BLD AUTO: 31 % (ref 14–44)
MCH RBC QN AUTO: 27.3 PG (ref 26.8–34.3)
MCH RBC QN AUTO: 27.7 PG (ref 26.8–34.3)
MCHC RBC AUTO-ENTMCNC: 32.3 G/DL (ref 31.4–37.4)
MCHC RBC AUTO-ENTMCNC: 32.9 G/DL (ref 31.4–37.4)
MCV RBC AUTO: 84 FL (ref 82–98)
MCV RBC AUTO: 85 FL (ref 82–98)
MONOCYTES # BLD AUTO: 1.42 THOUSAND/ΜL (ref 0.17–1.22)
MONOCYTES # BLD AUTO: 1.46 THOUSAND/ΜL (ref 0.17–1.22)
MONOCYTES NFR BLD AUTO: 12 % (ref 4–12)
MONOCYTES NFR BLD AUTO: 13 % (ref 4–12)
NEUTROPHILS # BLD AUTO: 6.22 THOUSANDS/ΜL (ref 1.85–7.62)
NEUTROPHILS # BLD AUTO: 6.62 THOUSANDS/ΜL (ref 1.85–7.62)
NEUTS SEG NFR BLD AUTO: 54 % (ref 43–75)
NEUTS SEG NFR BLD AUTO: 57 % (ref 43–75)
NRBC BLD AUTO-RTO: 1 /100 WBCS
NRBC BLD AUTO-RTO: 1 /100 WBCS
PLATELET # BLD AUTO: 246 THOUSANDS/UL (ref 149–390)
PLATELET # BLD AUTO: 256 THOUSANDS/UL (ref 149–390)
PMV BLD AUTO: 10.8 FL (ref 8.9–12.7)
PMV BLD AUTO: 10.8 FL (ref 8.9–12.7)
PROTHROMBIN TIME: 13.1 SECONDS (ref 11.6–14.5)
RBC # BLD AUTO: 3.68 MILLION/UL (ref 3.81–5.12)
RBC # BLD AUTO: 3.74 MILLION/UL (ref 3.81–5.12)
THROMBIN TIME: 15.9 SECONDS (ref 14.7–18.4)
THROMBIN TIME: 19.2 SECONDS (ref 14.7–18.4)
TIBC SERPL-MCNC: 492 UG/DL (ref 250–450)
WBC # BLD AUTO: 11.55 THOUSAND/UL (ref 4.31–10.16)
WBC # BLD AUTO: 11.59 THOUSAND/UL (ref 4.31–10.16)

## 2021-07-17 PROCEDURE — 99231 SBSQ HOSP IP/OBS SF/LOW 25: CPT | Performed by: OBSTETRICS & GYNECOLOGY

## 2021-07-17 PROCEDURE — 85610 PROTHROMBIN TIME: CPT | Performed by: OBSTETRICS & GYNECOLOGY

## 2021-07-17 PROCEDURE — 87086 URINE CULTURE/COLONY COUNT: CPT | Performed by: OBSTETRICS & GYNECOLOGY

## 2021-07-17 PROCEDURE — 81001 URINALYSIS AUTO W/SCOPE: CPT | Performed by: OBSTETRICS & GYNECOLOGY

## 2021-07-17 PROCEDURE — 85384 FIBRINOGEN ACTIVITY: CPT | Performed by: OBSTETRICS & GYNECOLOGY

## 2021-07-17 PROCEDURE — NC001 PR NO CHARGE: Performed by: OBSTETRICS & GYNECOLOGY

## 2021-07-17 PROCEDURE — 59025 FETAL NON-STRESS TEST: CPT | Performed by: OBSTETRICS & GYNECOLOGY

## 2021-07-17 PROCEDURE — 85732 THROMBOPLASTIN TIME PARTIAL: CPT | Performed by: OBSTETRICS & GYNECOLOGY

## 2021-07-17 PROCEDURE — 85025 COMPLETE CBC W/AUTO DIFF WBC: CPT | Performed by: OBSTETRICS & GYNECOLOGY

## 2021-07-17 PROCEDURE — 87147 CULTURE TYPE IMMUNOLOGIC: CPT | Performed by: OBSTETRICS & GYNECOLOGY

## 2021-07-17 RX ORDER — MUSCLE RUB CREAM 100; 150 MG/G; MG/G
CREAM TOPICAL 4 TIMES DAILY PRN
Status: DISCONTINUED | OUTPATIENT
Start: 2021-07-17 | End: 2021-07-19 | Stop reason: HOSPADM

## 2021-07-17 RX ORDER — ACETAMINOPHEN 325 MG/1
650 TABLET ORAL EVERY 6 HOURS PRN
Status: DISCONTINUED | OUTPATIENT
Start: 2021-07-17 | End: 2021-07-19 | Stop reason: HOSPADM

## 2021-07-17 RX ADMIN — ACETAMINOPHEN 650 MG: 325 TABLET, FILM COATED ORAL at 23:06

## 2021-07-17 RX ADMIN — ACETAMINOPHEN 650 MG: 325 TABLET, FILM COATED ORAL at 10:18

## 2021-07-17 RX ADMIN — SODIUM CHLORIDE, SODIUM LACTATE, POTASSIUM CHLORIDE, AND CALCIUM CHLORIDE 1000 ML: .6; .31; .03; .02 INJECTION, SOLUTION INTRAVENOUS at 22:05

## 2021-07-17 RX ADMIN — ACETAMINOPHEN 650 MG: 325 TABLET, FILM COATED ORAL at 16:54

## 2021-07-17 NOTE — PROGRESS NOTES
Progress Note - Maternal Fetal Medicine   Johan Roper Founds 30 y o  female MRN: [id number]  Unit/Bed#: -01 Encounter: [id number]    Assessment:  30 y o  Federico Scherer at 34w0d admitted with bleeding in the setting of likely chronic abruption, now stabilized without further bleeding  Hospital day 3    Plan:  1 ) Abruption              -Pad counts              -Fibrinogen: 383->369->323 -> 351              -Hgb 10 1->9 9->9 3 -> 10 2              -KB negative; PT/PTT wnl and stable              -WBC 20 08->16 81->14 11 -> 11 5              -UDS: negative              -Blood Type: A pos     2) IUP @ 34w with history of  section              - NST               - s/p BTM at [address] [facility] Evelio scan : 5lbs 10oz (75%ile, AC 95%ile), JIHAN 16 9cm, VERTEX, anterior placenta              - GBS collected and pending   - Plan for RLTCS and sterilization, MA31 signed 5/10/21     3) Anemia, related to dilution of pregnancy and blood loss   - Hgb today stable at 10 2   - s/p Venofer   - Maintain 2 IVs and active T&S    4) FEN              - Regular diet     5) DVT PPx:               SCD's    Subjective/Objective   Subjective:   She reports feeling well, although reports a headache (points to the right side of her lower neck) and some occasional right sided pains  No vaginal bleeding    Contractions: no  Loss of fluid: no  Vaginal bleeding: no  Fetal movement: yes    Pain: neck discomfort, as above  Tolerating PO: yes  Voiding: yes  Ambulating: yes  Headaches: no  Visual changes: no  Chest pain: no  Shortness of breath: no  Nausea: no  Vomiting/Diarrhea: no  Dysuria: no  Leg pain: no    Objective:     Vitals:   Temp:  [97 2 °F (36 2 °C)-98 8 °F (37 1 °C)] 97 7 °F (36 5 °C)  HR:  [80-88] 80  Resp:  [16-18] 16  BP: (106-107)/(59-68) 107/68       Physical Exam  Constitutional:       General: She is not in acute distress  Appearance: Normal appearance  She is not ill-appearing or toxic-appearing  HENT:      Head: Normocephalic and atraumatic  Eyes:      General: No scleral icterus  Conjunctiva/sclera: Conjunctivae normal    Cardiovascular:      Rate and Rhythm: Normal rate  Pulses: Normal pulses  Pulmonary:      Effort: Pulmonary effort is normal  No respiratory distress  Abdominal:      General: There is no distension  Palpations: Abdomen is soft  Tenderness: There is no abdominal tenderness  There is no guarding or rebound  Comments: gravid   Musculoskeletal:         General: Normal range of motion  Cervical back: Normal range of motion  Right lower leg: No edema  Left lower leg: No edema  Skin:     General: Skin is warm and dry  Findings: No bruising, erythema or lesion  Neurological:      General: No focal deficit present  Mental Status: She is alert  Mental status is at baseline  Psychiatric:         Mood and Affect: Mood normal          Behavior: Behavior normal            Fetal Assessment:  FHT: 150 / Moderate 6 - 25 bpm / Accelerations present, reactive  [place]: irregular, mild    Lab, Imaging and other studies: I have personally reviewed pertinent reports        Lab Results   Component Value Date    WBC 11 59 (H) 07/17/2021    HGB 10 2 (L) 07/17/2021    HCT 31 6 (L) 07/17/2021    MCV 85 07/17/2021     07/17/2021       Lab Results   Component Value Date    CALCIUM 8 9 07/15/2021    K 3 8 07/15/2021    CO2 17 (L) 07/15/2021     07/15/2021    BUN 7 07/15/2021    CREATININE 0 71 07/15/2021       Lab Results   Component Value Date    ALT 16 07/15/2021    AST 15 07/15/2021    ALKPHOS 132 (H) 07/15/2021       Rubina Christensen MD  OBGYN, R-3  7/17/2021  6:49 AM

## 2021-07-18 LAB
APTT PPP: 23 SECONDS (ref 23–37)
BACTERIA UR QL AUTO: ABNORMAL /HPF
BILIRUB UR QL STRIP: NEGATIVE
CLARITY UR: CLEAR
COLOR UR: YELLOW
FIBRINOGEN PPP-MCNC: 398 MG/DL (ref 227–495)
GLUCOSE UR STRIP-MCNC: ABNORMAL MG/DL
HGB UR QL STRIP.AUTO: ABNORMAL
INR PPP: 1.01 (ref 0.84–1.19)
KETONES UR STRIP-MCNC: NEGATIVE MG/DL
LEUKOCYTE ESTERASE UR QL STRIP: ABNORMAL
NITRITE UR QL STRIP: POSITIVE
NON-SQ EPI CELLS URNS QL MICRO: ABNORMAL /HPF
PH UR STRIP.AUTO: 6 [PH]
PROT UR STRIP-MCNC: NEGATIVE MG/DL
PROTHROMBIN TIME: 13.5 SECONDS (ref 11.6–14.5)
RBC #/AREA URNS AUTO: ABNORMAL /HPF
SP GR UR STRIP.AUTO: 1.01 (ref 1–1.03)
UROBILINOGEN UR QL STRIP.AUTO: 1 E.U./DL
WBC #/AREA URNS AUTO: ABNORMAL /HPF

## 2021-07-18 PROCEDURE — 99231 SBSQ HOSP IP/OBS SF/LOW 25: CPT | Performed by: OBSTETRICS & GYNECOLOGY

## 2021-07-18 PROCEDURE — NC001 PR NO CHARGE: Performed by: OBSTETRICS & GYNECOLOGY

## 2021-07-18 PROCEDURE — 59025 FETAL NON-STRESS TEST: CPT | Performed by: OBSTETRICS & GYNECOLOGY

## 2021-07-18 RX ORDER — SODIUM CHLORIDE, SODIUM LACTATE, POTASSIUM CHLORIDE, CALCIUM CHLORIDE 600; 310; 30; 20 MG/100ML; MG/100ML; MG/100ML; MG/100ML
125 INJECTION, SOLUTION INTRAVENOUS CONTINUOUS
Status: DISCONTINUED | OUTPATIENT
Start: 2021-07-18 | End: 2021-07-19 | Stop reason: HOSPADM

## 2021-07-18 RX ADMIN — ACETAMINOPHEN 650 MG: 325 TABLET, FILM COATED ORAL at 12:40

## 2021-07-18 RX ADMIN — SODIUM CHLORIDE, SODIUM LACTATE, POTASSIUM CHLORIDE, AND CALCIUM CHLORIDE 125 ML/HR: .6; .31; .03; .02 INJECTION, SOLUTION INTRAVENOUS at 08:30

## 2021-07-18 RX ADMIN — ACETAMINOPHEN 650 MG: 325 TABLET, FILM COATED ORAL at 06:34

## 2021-07-18 RX ADMIN — ACETAMINOPHEN 650 MG: 325 TABLET, FILM COATED ORAL at 18:40

## 2021-07-18 RX ADMIN — MENTHOL, METHYL SALICYLATE 1 APPLICATION: 10; 15 CREAM TOPICAL at 15:42

## 2021-07-18 RX ADMIN — MENTHOL, METHYL SALICYLATE: 10; 15 CREAM TOPICAL at 08:22

## 2021-07-18 RX ADMIN — SODIUM CHLORIDE, SODIUM LACTATE, POTASSIUM CHLORIDE, AND CALCIUM CHLORIDE 125 ML/HR: .6; .31; .03; .02 INJECTION, SOLUTION INTRAVENOUS at 00:45

## 2021-07-18 NOTE — PROGRESS NOTES
Progress Note - Maternal Fetal Medicine   Karon Huff Sick 30 y o  female MRN: [id number]  Unit/Bed#: -01 Encounter: [id number]    Assessment:  30 y o  Sunil Granados at 34w1d admitted with bleeding in the setting of a chronic placental abruption  Hospital day #4    Plan:  1) Abruption              -EKA counts              -Fibrinogen: 383->369->323 -> 351 -> 398              -Hgb 10 1->9 9->9 3 -> 10 2              -KB negative; PT/PTT wnl and stable              -WBC 20 08->16 81->14 11 -> 11 5              -UDS: negative              -Blood Type: A pos     2) IUP @ 34w with history of  section              - NST TID              - s/p BTM at [address]- Growth scan : 5lbs 10oz (75%ile, AC 95%ile), JIHAN 16 9cm, VERTEX, anterior placenta              - GBS Positive              - Plan for RLTCS and sterilization, MA31 signed 5/10/21     3) Anemia, related to dilution of pregnancy and blood loss              - Hgb today stable at 10 2              - s/p Venofer              - Maintain 2 IVs and active T&S     4) Fetal tachycardia              - Resolved with IV fluid bolus   - maternal temperature 99 1   - UA w/ leuks and nitrites, f/u urine culture     5) DVT PPx:               - SCD's    6) Diet   - Regular    Subjective/Objective     Subjective:   Patient reports continued head and neck pain on the right side that did initially improve with the heating pad; denies any visual changes, does note some lightheadedness when she sits up  Has some mild tightening in her lower abdomen, but it is not very painful  She denies any vaginal bleeding or other vaginal discharge  She reports normal fetal movement      [employer]  [id number] used for translation for this encounter    Objective:     Vitals:   Temp:  [98 1 °F (36 7 °C)-99 6 °F (37 6 °C)] 98 1 °F (36 7 °C)  HR:  [] 88  Resp:  [18-20] 18  BP: ()/(50-69) 102/59     Physical Exam:     Physical Exam  Constitutional: Appearance: She is well-developed  HENT:      Head: Normocephalic and atraumatic  Comments: Tenderness on right side behind ear  Eyes:      Conjunctiva/sclera: Conjunctivae normal    Cardiovascular:      Rate and Rhythm: Normal rate and regular rhythm  Heart sounds: Normal heart sounds  Pulmonary:      Effort: Pulmonary effort is normal  No respiratory distress  Breath sounds: Normal breath sounds  Abdominal:      Palpations: Abdomen is soft  Tenderness: There is no abdominal tenderness  There is no guarding or rebound  Genitourinary:     Vagina: No vaginal discharge  Musculoskeletal:         General: No tenderness  Normal range of motion  Cervical back: Normal range of motion and neck supple  Skin:     General: Skin is warm and dry  Neurological:      Mental Status: She is alert and oriented to person, place, and time  Psychiatric:         Behavior: Behavior normal          Thought Content: Thought content normal        Fetal Assessment:  FHT: 150 bpm / Moderate 6 - 25 bpm / no decels, reactive  [place]: q5-15 min    Lab, Imaging and other studies: I have personally reviewed pertinent reports        Lab Results   Component Value Date    WBC 11 55 (H) 07/17/2021    HGB 10 2 (L) 07/17/2021    HCT 31 0 (L) 07/17/2021    MCV 84 07/17/2021     07/17/2021       Lab Results   Component Value Date    CALCIUM 8 9 07/15/2021    K 3 8 07/15/2021    CO2 17 (L) 07/15/2021     07/15/2021    BUN 7 07/15/2021    CREATININE 0 71 07/15/2021       Lab Results   Component Value Date    ALT 16 07/15/2021    AST 15 07/15/2021    ALKPHOS 132 (H) 07/15/2021       Luisa Johnson MD  OBGYN, PGY-3  7/18/2021  9:21 AM

## 2021-07-18 NOTE — QUICK NOTE
OB HOSPITALIST ON CALL    Called to patient's bedside by nursing staff for fetal tachycardia to 180  Moderate variability, no decelerations  Mother reports no shortness of breath/nausea/vomit/abdominal pain/vaginal bleeding  /69 (BP Location: Left arm)   Pulse 99   Temp 99 1 °F (37 3 °C) (Oral)   Resp 20   Ht 5' (1 524 m)   Wt 76 7 kg (169 lb)   LMP 11/21/2020 (Exact Date)   SpO2 98%   BMI 33 01 kg/m²     Cor - regular, tachycardic  Lungs - clear to auscultation  Abdomen - soft, nontender, nondistended, gravid uterus which is nontender  Extremities - +2 pedal pulses, no edema, calves nontender    FHTs - previously 180s, now down to 160s, moderate variability, reactive, no decelerations  toco - quiet    Assessment:  IUP at 34W0D with chronic placental abruption now with fetal tachycardia, maternal tachycardia and low grade fever 99 1 F -  No localizing source for infection; no vaginal bleeding or abdominal pain suggestive of worsening placental abruption    Plan:  1  Maternal low grade fever and maternal/fetal tachycardia -   IVF bolus one liter, tylenol, check CBC with diff and urinalysis with culture    2  Placental abruption - no evidence of bleeding, no abdominal pain - check coags now    3   34W0D gestation - will keep on continuous fetal monitoring until fetal tachycardia resolves    Tigertexted to Dr [facility] MD  OB/GYN Hospitalist  [phone]  7/17/2021   11:27 PM

## 2021-07-19 ENCOUNTER — TELEPHONE (OUTPATIENT)
Dept: OBGYN CLINIC | Facility: CLINIC | Age: 36
End: 2021-07-19

## 2021-07-19 VITALS
BODY MASS INDEX: 33.18 KG/M2 | HEART RATE: 92 BPM | HEIGHT: 60 IN | WEIGHT: 169 LBS | OXYGEN SATURATION: 96 % | SYSTOLIC BLOOD PRESSURE: 105 MMHG | DIASTOLIC BLOOD PRESSURE: 65 MMHG | TEMPERATURE: 98.3 F | RESPIRATION RATE: 18 BRPM

## 2021-07-19 PROBLEM — Z3A.34 34 WEEKS GESTATION OF PREGNANCY: Status: ACTIVE | Noted: 2021-05-18

## 2021-07-19 LAB
ABO GROUP BLD: NORMAL
BACTERIA UR CULT: ABNORMAL
BACTERIA UR CULT: ABNORMAL
BLD GP AB SCN SERPL QL: NEGATIVE
RH BLD: POSITIVE
SPECIMEN EXPIRATION DATE: NORMAL

## 2021-07-19 PROCEDURE — 86850 RBC ANTIBODY SCREEN: CPT | Performed by: OBSTETRICS & GYNECOLOGY

## 2021-07-19 PROCEDURE — 59025 FETAL NON-STRESS TEST: CPT | Performed by: OBSTETRICS & GYNECOLOGY

## 2021-07-19 PROCEDURE — NC001 PR NO CHARGE: Performed by: OBSTETRICS & GYNECOLOGY

## 2021-07-19 PROCEDURE — 86900 BLOOD TYPING SEROLOGIC ABO: CPT | Performed by: OBSTETRICS & GYNECOLOGY

## 2021-07-19 PROCEDURE — 86901 BLOOD TYPING SEROLOGIC RH(D): CPT | Performed by: OBSTETRICS & GYNECOLOGY

## 2021-07-19 PROCEDURE — 99231 SBSQ HOSP IP/OBS SF/LOW 25: CPT | Performed by: OBSTETRICS & GYNECOLOGY

## 2021-07-19 RX ORDER — AMOXICILLIN 875 MG/1
875 TABLET, COATED ORAL 2 TIMES DAILY
Qty: 10 TABLET | Refills: 0 | Status: SHIPPED | OUTPATIENT
Start: 2021-07-19 | End: 2021-08-07 | Stop reason: HOSPADM

## 2021-07-19 NOTE — PROGRESS NOTES
Progress Note - Maternal Fetal Medicine   [facility] Othelia Grief 30 y o  female MRN: [id number]  Unit/Bed#: -01 Encounter: [id number]    Assessment:  30 y o   at 34w2d admitted with bleeding in the setting of a chronic placental abruption  Hospital day #5    Plan:  1) Chronic Abruption              -Pad counts              -Fibrinogen: 383->369->323 -> 351 -> 398              -Hgb 10 1->9 9->9 3 -> 10 2              -KB negative; PT/PTT wnl and stable              -WBC 20 08->16 81->14 11 -> 11 5              -UDS: negative              -Blood Type: A pos     2) IUP @ 34w with history of  section              - NST TID              - s/p BTM at [address]- Growth scan : 5lbs 10oz (75%ile, AC 95%ile), JIHAN 16 9cm, VERTEX, anterior placenta              - GBS Positive              - Plan for RLTCS and sterilization, MA31 signed 5/10/21     3) Anemia, related to dilution of pregnancy and blood loss              - Hgb today stable at 10 2              - s/p Venofer              - Maintain 2 IVs and active T&S     4) Fetal tachycardia on               - Resolved with IV fluid bolus   - Maternal temperature 99 1   - UA w/ leuks and nitrites, f/u urine culture    -Will start antibiotics today    5) DVT PPx:               - SCD's    6) Diet   - Regular    Subjective/Objective     Subjective:   Patient has no complaints this morning  She denies fevers/chills, N/V, chest pain/ SOB, RUQ pain, back pain, or calf tenderness  She reports no bleeding overnight  She denies decreased fetal movement, leakage of fluid, or contractions  [employer]  [id number] used for translation for this encounter    Objective:     Vitals:   Temp:  [98 1 °F (36 7 °C)-98 8 °F (37 1 °C)] 98 8 °F (37 1 °C)  HR:  [86-88] 86  Resp:  [18] 18  BP: (102-129)/(54-61) 102/54     Physical Exam:     Physical Exam  Constitutional:       Appearance: She is well-developed     HENT:      Head: Normocephalic and atraumatic  Comments: Tenderness on right side behind ear  Eyes:      Conjunctiva/sclera: Conjunctivae normal    Cardiovascular:      Rate and Rhythm: Normal rate and regular rhythm  Heart sounds: Normal heart sounds  Pulmonary:      Effort: Pulmonary effort is normal  No respiratory distress  Breath sounds: Normal breath sounds  Abdominal:      Palpations: Abdomen is soft  Tenderness: There is no abdominal tenderness  There is no guarding or rebound  Genitourinary:     Vagina: No vaginal discharge  Musculoskeletal:         General: No tenderness  Normal range of motion  Cervical back: Normal range of motion and neck supple  Skin:     General: Skin is warm and dry  Neurological:      Mental Status: She is alert and oriented to person, place, and time  Psychiatric:         Behavior: Behavior normal          Thought Content: Thought content normal        Fetal Assessment:  FHT: 150 bpm / Moderate 6 - 25 bpm / no decels, reactive [id number]  [place]: q5-15 min    Lab, Imaging and other studies: I have personally reviewed pertinent reports        Lab Results   Component Value Date    WBC 11 55 (H) 07/17/2021    HGB 10 2 (L) 07/17/2021    HCT 31 0 (L) 07/17/2021    MCV 84 07/17/2021     07/17/2021       Lab Results   Component Value Date    CALCIUM 8 9 07/15/2021    K 3 8 07/15/2021    CO2 17 (L) 07/15/2021     07/15/2021    BUN 7 07/15/2021    CREATININE 0 71 07/15/2021       Lab Results   Component Value Date    ALT 16 07/15/2021    AST 15 07/15/2021    ALKPHOS 132 (H) 07/15/2021       Savana Orellana MD  OBGYN, PGY-3  7/19/2021  6:32 AM

## 2021-07-19 NOTE — PLAN OF CARE
Problem: PAIN - ADULT  Goal: Verbalizes/displays adequate comfort level or baseline comfort level  Description: Interventions:  - Encourage patient to monitor pain and request assistance  - Assess pain using appropriate pain scale  - Administer analgesics based on type and severity of pain and evaluate response  - Implement non-pharmacological measures as appropriate and evaluate response  - Consider cultural and social influences on pain and pain management  - Notify physician/advanced practitioner if interventions unsuccessful or patient reports new pain  Outcome: Progressing     Problem: INFECTION - ADULT  Goal: Absence or prevention of progression during hospitalization  Description: INTERVENTIONS:  - Assess and monitor for signs and symptoms of infection  - Monitor lab/diagnostic results  - Monitor all insertion sites, i e  indwelling lines, tubes, and drains  - Monitor endotracheal if appropriate and nasal secretions for changes in amount and color  - [place] appropriate cooling/warming therapies per order  - Administer medications as ordered  - Instruct and encourage patient and family to use good hand hygiene technique  - Identify and instruct in appropriate isolation precautions for identified infection/condition  Outcome: Progressing  Goal: Absence of fever/infection during neutropenic period  Description: INTERVENTIONS:  - Monitor WBC    Outcome: Progressing     Problem: SAFETY ADULT  Goal: Patient will remain free of falls  Description: INTERVENTIONS:  - Educate patient/family on patient safety including physical limitations  - Instruct patient to call for assistance with activity   - Consult OT/PT to assist with strengthening/mobility   - Keep Call bell within reach  - Keep bed low and locked with side rails adjusted as appropriate  - Keep care items and personal belongings within reach  - Initiate and maintain comfort rounds  - Make Fall Risk Sign visible to staff  - Apply yellow socks and bracelet for high fall risk patients  - Consider moving patient to room near nurses station  Outcome: Progressing  Goal: Maintain or return to baseline ADL function  Description: INTERVENTIONS:  -  Assess patient's ability to carry out ADLs; assess patient's baseline for ADL function and identify physical deficits which impact ability to perform ADLs (bathing, care of mouth/teeth, toileting, grooming, dressing, etc )  - Assess/evaluate cause of self-care deficits   - Assess range of motion  - Assess patient's mobility; develop plan if impaired  - Assess patient's need for assistive devices and provide as appropriate  - Encourage maximum independence but intervene and supervise when necessary  - Involve family in performance of ADLs  - Assess for home care needs following discharge   - Consider OT consult to assist with ADL evaluation and planning for discharge  - Provide patient education as appropriate  Outcome: Progressing  Goal: Maintains/Returns to pre admission functional level  Description: INTERVENTIONS:  - Perform BMAT or MOVE assessment daily    - Set and communicate daily mobility goal to care team and patient/family/caregiver  - Collaborate with rehabilitation services on mobility goals if consulted  - Out of bed for toileting  - Record patient progress and toleration of activity level   Outcome: Progressing     Problem: Knowledge Deficit  Goal: Patient/family/caregiver demonstrates understanding of disease process, treatment plan, medications, and discharge instructions  Description: Complete learning assessment and assess knowledge base  Interventions:  - Provide teaching at level of understanding  - Provide teaching via preferred learning methods  Outcome: Progressing  Goal: Verbalizes understanding of labor plan  Description: Assess patient/family/caregiver's baseline knowledge level and ability to understand information    Provide education via patient/family/caregiver's preferred learning method at appropriate level of understanding  1  Provide teaching at level of understanding  2  Provide teaching via preferred learning method(s)  Outcome: Progressing     Problem: DISCHARGE PLANNING  Goal: Discharge to home or other facility with appropriate resources  Description: INTERVENTIONS:  - Identify barriers to discharge w/patient and caregiver  - Arrange for needed discharge resources and transportation as appropriate  - Identify discharge learning needs (meds, wound care, etc )  - Arrange for interpretive services to assist at discharge as needed  - Refer to Case Management Department for coordinating discharge planning if the patient needs post-hospital services based on physician/advanced practitioner order or complex needs related to functional status, cognitive ability, or social support system  Outcome: Progressing     Problem: Labor & Delivery  Goal: Manages discomfort  Description: Assess and monitor for signs and symptoms of discomfort  Assess patient's pain level regularly and per hospital policy  Administer medications as ordered  Support use of nonpharmacological methods to help control pain such as distraction, imagery, relaxation, and application of heat and cold  Collaborate with interdisciplinary team and patient to determine appropriate pain management plan  1  Include patient in decisions related to comfort  2  Offer non-pharmacological pain management interventions  3  Report ineffective pain management to physician  Outcome: Progressing  Goal: Patient vital signs are stable  Description: 1  Assess vital signs - vaginal delivery    Outcome: Progressing

## 2021-07-19 NOTE — PLAN OF CARE
Problem: PAIN - ADULT  Goal: Verbalizes/displays adequate comfort level or baseline comfort level  Description: Interventions:  - Encourage patient to monitor pain and request assistance  - Assess pain using appropriate pain scale  - Administer analgesics based on type and severity of pain and evaluate response  - Implement non-pharmacological measures as appropriate and evaluate response  - Consider cultural and social influences on pain and pain management  - Notify physician/advanced practitioner if interventions unsuccessful or patient reports new pain  7/19/2021 1507 by Sharita Mckeon RN  Outcome: Adequate for Discharge  7/19/2021 1140 by Sharita Mckeon RN  Outcome: Progressing     Problem: INFECTION - ADULT  Goal: Absence or prevention of progression during hospitalization  Description: INTERVENTIONS:  - Assess and monitor for signs and symptoms of infection  - Monitor lab/diagnostic results  - Monitor all insertion sites, i e  indwelling lines, tubes, and drains  - Monitor endotracheal if appropriate and nasal secretions for changes in amount and color  - [place] appropriate cooling/warming therapies per order  - Administer medications as ordered  - Instruct and encourage patient and family to use good hand hygiene technique  - Identify and instruct in appropriate isolation precautions for identified infection/condition  7/19/2021 1507 by Sharita Mckeon RN  Outcome: Adequate for Discharge  7/19/2021 1140 by Sharita Mckeon RN  Outcome: Progressing  Goal: Absence of fever/infection during neutropenic period  Description: INTERVENTIONS:  - Monitor WBC    7/19/2021 1507 by Sharita Mckeon RN  Outcome: Adequate for Discharge  7/19/2021 1140 by Sharita Mckeon RN  Outcome: Progressing     Problem: SAFETY ADULT  Goal: Patient will remain free of falls  Description: INTERVENTIONS:  - Educate patient/family on patient safety including physical limitations  - Instruct patient to call for assistance with activity   - Consult OT/PT to assist with strengthening/mobility   - Keep Call bell within reach  - Keep bed low and locked with side rails adjusted as appropriate  - Keep care items and personal belongings within reach  - Initiate and maintain comfort rounds  - Make Fall Risk Sign visible to staff  - Apply yellow socks and bracelet for high fall risk patients  - Consider moving patient to room near nurses station  7/19/2021 1507 by Daniella Gonzalez RN  Outcome: Adequate for Discharge  7/19/2021 1140 by Daniella Gonzalez RN  Outcome: Progressing  Goal: Maintain or return to baseline ADL function  Description: INTERVENTIONS:  -  Assess patient's ability to carry out ADLs; assess patient's baseline for ADL function and identify physical deficits which impact ability to perform ADLs (bathing, care of mouth/teeth, toileting, grooming, dressing, etc )  - Assess/evaluate cause of self-care deficits   - Assess range of motion  - Assess patient's mobility; develop plan if impaired  - Assess patient's need for assistive devices and provide as appropriate  - Encourage maximum independence but intervene and supervise when necessary  - Involve family in performance of ADLs  - Assess for home care needs following discharge   - Consider OT consult to assist with ADL evaluation and planning for discharge  - Provide patient education as appropriate  7/19/2021 1507 by Daniella Gonzalez RN  Outcome: Adequate for Discharge  7/19/2021 1140 by Daniella Gonzalez RN  Outcome: Progressing  Goal: Maintains/Returns to pre admission functional level  Description: INTERVENTIONS:  - Perform BMAT or MOVE assessment daily    - Set and communicate daily mobility goal to care team and patient/family/caregiver     - Collaborate with rehabilitation services on mobility goals if consult  - Out of bed for toileting  - Record patient progress and toleration of activity level   7/19/2021 1507 by Angelia Thrasher RN  Outcome: Adequate for Discharge  7/19/2021 1140 by Angelia Thrasher RN  Outcome: Progressing     Problem: Knowledge Deficit  Goal: Patient/family/caregiver demonstrates understanding of disease process, treatment plan, medications, and discharge instructions  Description: Complete learning assessment and assess knowledge base  Interventions:  - Provide teaching at level of understanding  - Provide teaching via preferred learning methods  7/19/2021 1507 by Angelia Thrasher RN  Outcome: Adequate for Discharge  7/19/2021 1140 by Angelia Thrasher RN  Outcome: Progressing  Goal: Verbalizes understanding of labor plan  Description: Assess patient/family/caregiver's baseline knowledge level and ability to understand information  Provide education via patient/family/caregiver's preferred learning method at appropriate level of understanding  1  Provide teaching at level of understanding  2  Provide teaching via preferred learning method(s)    7/19/2021 1507 by Angelia Thrasher RN  Outcome: Adequate for Discharge  7/19/2021 1140 by Angelia Thrasher RN  Outcome: Progressing     Problem: DISCHARGE PLANNING  Goal: Discharge to home or other facility with appropriate resources  Description: INTERVENTIONS:  - Identify barriers to discharge w/patient and caregiver  - Arrange for needed discharge resources and transportation as appropriate  - Identify discharge learning needs (meds, wound care, etc )  - Arrange for interpretive services to assist at discharge as needed  - Refer to Case Management Department for coordinating discharge planning if the patient needs post-hospital services based on physician/advanced practitioner order or complex needs related to functional status, cognitive ability, or social support system  7/19/2021 1507 by Angelia Thrasher RN  Outcome: Adequate for Discharge  7/19/2021 1140 by Sharon Frank RN  Outcome: Progressing     Problem: Labor & Delivery  Goal: Manages discomfort  Description: Assess and monitor for signs and symptoms of discomfort  Assess patient's pain level regularly and per hospital policy  Administer medications as ordered  Support use of nonpharmacological methods to help control pain such as distraction, imagery, relaxation, and application of heat and cold  Collaborate with interdisciplinary team and patient to determine appropriate pain management plan  1  Include patient in decisions related to comfort  2  Offer non-pharmacological pain management interventions  3  Report ineffective pain management to physician  7/19/2021 1507 by Sharon Frank RN  Outcome: Adequate for Discharge  7/19/2021 1140 by Sharon Frank RN  Outcome: Progressing  Goal: Patient vital signs are stable  Description: 1  Assess vital signs - vaginal delivery    7/19/2021 1507 by Sharon Frank RN  Outcome: Adequate for Discharge  7/19/2021 1140 by Sharon Frank RN  Outcome: Progressing

## 2021-07-19 NOTE — TELEPHONE ENCOUNTER
No shown for pn visit 7-13-21  Spoke to pt and she stated she was in the hospital in [place]  Pt will call to r/s once she's discharged

## 2021-07-19 NOTE — DISCHARGE SUMMARY
Kahlil Julio 30 y o  female MRN: [id number]  Unit/Bed#: -01 Encounter: [id number]    Admission Date: 7/15/2021   Discharge Date: 21    Attending Physician:   Dr Laura Fagan Physician(s):   Maternal Fetal Medicine    Admitting Diagnosis:   Chronic placental abruption  Anemia    Discharge Diagnosis:   Chronic placental abruption  Urinary tract infection    Procedures Performed:   None    HPI:  28-year-old  001 at 33 weeks gestation presenting with vaginal bleeding in the setting of a chronic placental abruption  She was admitted from  to  at [facility] is vaginal bleeding and was diagnosed with a placental abruption at that time  She was given a course of betamethasone  Hospital Course - Problem Based:  Patient is a 28-year-old  at 35 weeks who presented initially with an episode of vaginal bleeding in the setting of a chronic placental abruption  She was admitted for continuous fetal monitoring and formal ultrasound by Maternal-Fetal Medicine  On hospital day to her formal growth scan was performed and there were no signs of placental abruption noted an estimated fetal weight was noted to be in the 75th percentile  On hospital day 3 in to hospital day for a was noted that patient was having fetal tachycardia that resolved with IV fluids  UA showed signs of urinary tract infection, however patient denied any symptoms  On hospital day 5 patient was stable for discharge  She was discharged home on 5 days of amoxicillin for UTI showing group B strep  She was scheduled for follow-up in the office with her obstetrician weekly and twice weekly  testing  Her repeat  was scheduled for       Lab Results:   Lab Results   Component Value Date    WBC 11 55 (H) 2021    HGB 10 2 (L) 2021    HCT 31 0 (L) 2021    MCV 84 2021     2021     Lab Results   Component Value Date CALCIUM 8 9 07/15/2021    K 3 8 07/15/2021    CO2 17 (L) 07/15/2021     07/15/2021    BUN 7 07/15/2021    CREATININE 0 71 07/15/2021     No results found for: POCGLU  Lab Results   Component Value Date    PTT 23 07/17/2021     Lab Results   Component Value Date    INR 1 01 07/17/2021    INR 1 02 07/16/2021    INR 1 02 07/16/2021    PROTIME 13 5 07/17/2021    PROTIME 13 1 07/16/2021    PROTIME 13 6 05/65/9633       Complications:   None    Condition at Discharge:   good     Discharge Medications:   See after visit summary for reconciled discharge medications provided to patient and family  Discharge instructions: See after visit summary for complete information  Provisions for Follow-Up Care:   See after visit summary for information related to follow-up care and any pertinent home health orders  Disposition: Home  Planned Readmission: Yes due to nature of patient's pregnancy    Code Status:  Full Code  Discharge Statement   I spent 20 minutes discharging the patient  This time was spent on the day of discharge  I had direct contact with the patient on the day of discharge  Additional documentation is required if more than 30 minutes were spent on discharge

## 2021-07-20 ENCOUNTER — TELEPHONE (OUTPATIENT)
Dept: PERINATAL CARE | Facility: CLINIC | Age: 36
End: 2021-07-20

## 2021-07-20 ENCOUNTER — TELEPHONE (OUTPATIENT)
Dept: OBGYN CLINIC | Facility: CLINIC | Age: 36
End: 2021-07-20

## 2021-07-20 NOTE — TELEPHONE ENCOUNTER
Called patient WITH LANGUAGE LINE to schedule follow up appointment per staff message:    Kavon Jarvis MD  [address] [place] [address]  Cc: Pepper Hodges MD  Formerly [facility],   This patient is currently hospitalized at [facility] and will be discharged today   She needs twice weekly  fetal surveillance (NST's) at the [facility]  Jamilah Delvalle you can reach out to schedule that would be greatly appreciated, patient is [place] speaking  Thanks,   Kavon Jarvis       Spoke with patient and scheduled appointment - 21  3:15  [facility]

## 2021-07-20 NOTE — TELEPHONE ENCOUNTER
----- Message from Dariela Johnson MD sent at 7/19/2021  9:46 AM EDT -----  Regarding: Prenatal appt scheduling  Hello,  This pt is currently hospitalized and will be discharged today  She needs follow up weekly until delivery  She can be scheduled with me every Thursday afternoon, starting this Thursday  She is [place] speaking    Thanks,  Eagle Poon

## 2021-07-23 ENCOUNTER — ROUTINE PRENATAL (OUTPATIENT)
Dept: PERINATAL CARE | Facility: CLINIC | Age: 36
End: 2021-07-23
Payer: COMMERCIAL

## 2021-07-23 VITALS
HEIGHT: 60 IN | BODY MASS INDEX: 34.16 KG/M2 | SYSTOLIC BLOOD PRESSURE: 113 MMHG | DIASTOLIC BLOOD PRESSURE: 69 MMHG | HEART RATE: 102 BPM | WEIGHT: 174 LBS

## 2021-07-23 DIAGNOSIS — O46.93 VAGINAL BLEEDING IN PREGNANCY, THIRD TRIMESTER: ICD-10-CM

## 2021-07-23 DIAGNOSIS — Z3A.34 34 WEEKS GESTATION OF PREGNANCY: Primary | ICD-10-CM

## 2021-07-23 PROCEDURE — 59025 FETAL NON-STRESS TEST: CPT | Performed by: OBSTETRICS & GYNECOLOGY

## 2021-07-23 NOTE — LETTER
NST sleeve cover sheet    Patient name: Haley Foy  : 1985  MRN: [id number]    RUEL: Estimated Date of Delivery: 21    Obstetrician: __[facility]_____________    Reason(s) for testing:  __________________________________________      Testing frequency:    ___ 2x/wk  ___ 1x/wk  ___ Dopplers  ___ BPP?       Last growth scan: __________________________________________

## 2021-07-23 NOTE — LETTER
July 23, 2021     Garrett Nyhan, MD  [address] [address]    Patient: Elliott Li   YOB: 1985   Date of Visit: 7/23/2021       Dear Dr Ella Turner: Thank you for referring Elliott Li to me for evaluation  Below are my notes for this consultation  If you have questions, please do not hesitate to call me  I look forward to following your patient along with you  Sincerely,        Aman Danielle MD        CC: No Recipients  Aman Danielle MD  7/23/2021  4:19 PM  Sign when Signing Visit  [address] [facility] [[email or web address] [address]: Ms Valeriano Yoon was seen today at 34w6d for NST (found under the pregnancy episode) which I reviewed the RN assessment and agree    Please don't hesitate to contact our office with any concerns or questions   -Aman Danielle MD

## 2021-07-23 NOTE — PROGRESS NOTES
[address]  Meseret ID # [id number]:  Rooming questions answered and explained NST equipment, procedure and expected outcome  Patient reports doing daily fetal kick counts  Denies contractions, no cramping, no LOF , no bleeding

## 2021-07-23 NOTE — LETTER
NST sleeve cover sheet    Patient name: Toya Witt  : 1985  MRN: [id number]    RUEL: Estimated Date of Delivery: 21    Obstetrician: ______SW_________    Reason(s) for testing:  _____________Bleeding____________      Testing frequency:    __x_ 2x/wk  ___ 1x/wk  ___ Dopplers  ___ BPP?       Last growth scan: __________________________________________

## 2021-07-23 NOTE — PROGRESS NOTES
[address] [facility] [address]: Ms Beto Noyola was seen today at 34w6d for NST (found under the pregnancy episode) which I reviewed the RN assessment and agree    Please don't hesitate to contact our office with any concerns or questions   -Khai Lewis MD

## 2021-07-23 NOTE — LETTER
NST sleeve cover sheet    Patient name: General Meiers  : 1985  MRN: [id number]    RUEL: Estimated Date of Delivery: 21    Obstetrician: ___[facility]____________    Reason(s) for testing:  __________________________________________      Testing frequency:    ___ 2x/wk  ___ 1x/wk  ___ Dopplers  ___ BPP?       Last growth scan: __________________________________________

## 2021-07-23 NOTE — PATIENT INSTRUCTIONS
Kick Counts in Pregnancy   AMBULATORY CARE:   Kick counts  measure how much your baby is moving in your womb  A kick from your baby can be felt as a twist, turn, swish, roll, or jab  It is common to feel your baby kicking at 26 to 28 weeks of pregnancy  You may feel your baby kick as early as 20 weeks of pregnancy  You may want to start counting at 28 weeks  Contact your healthcare provider immediately if:   · You feel a change in the number of kicks or movements of your baby  · You feel fewer than 10 kicks within 2 hours  · You have questions or concerns about your baby's movements  Why measure kick counts:  Your baby's movement may provide information about your baby's health  He or she may move less, or not at all, if there are problems  Your baby may move less if he or she is not getting enough oxygen or nutrition from the placenta  Do not smoke while you are pregnant  Smoking decreases the amount of oxygen that gets to your baby  Talk to your healthcare provider if you need help to quit smoking  Tell your healthcare provider as soon as you feel a change in your baby's movements  When to measure kick counts:   · Measure kick counts at the same time every day  · Measure kick counts when your baby is awake and most active  Your baby may be most active in the evening  How to measure kick counts:  Check that your baby is awake before you measure kick counts  You can wake up your baby by lightly pushing on your belly, walking, or drinking something cold  Your healthcare provider may tell you different ways to measure kick counts  You may be told to do the following:  · Use a chart or clock to keep track of the time you start and finish counting  · Sit in a chair or lie on your left side  · Place your hands on the largest part of your belly  · Count until you reach 10 kicks  Write down how much time it takes to count 10 kicks  · It may take 30 minutes to 2 hours to count 10 kicks  It should not take more than 2 hours to count 10 kicks  Follow up with your healthcare provider as directed:  Write down your questions so you remember to ask them during your visits  © Copyright [employer] 2021 Information is for End User's use only and may not be sold, redistributed or otherwise used for commercial purposes  All illustrations and images included in CareNotes® are the copyrighted property of A D A M , Inc  or [facility]   The above information is an  only  It is not intended as medical advice for individual conditions or treatments  Talk to your doctor, nurse or pharmacist before following any medical regimen to see if it is safe and effective for you

## 2021-07-24 ENCOUNTER — HOSPITAL ENCOUNTER (INPATIENT)
Facility: HOSPITAL | Age: 36
LOS: 14 days | Discharge: HOME | DRG: 539 | End: 2021-08-07
Attending: OBSTETRICS & GYNECOLOGY | Admitting: OBSTETRICS & GYNECOLOGY
Payer: COMMERCIAL

## 2021-07-24 DIAGNOSIS — O34.219 PREVIOUS CESAREAN SECTION COMPLICATING PREGNANCY: ICD-10-CM

## 2021-07-24 DIAGNOSIS — O23.40 URINARY TRACT INFECTION AFFECTING PREGNANCY: ICD-10-CM

## 2021-07-24 DIAGNOSIS — O45.90 PLACENTA ABRUPTION, DELIVERED, CURRENT HOSPITALIZATION: ICD-10-CM

## 2021-07-24 DIAGNOSIS — Z98.891 S/P REPEAT LOW TRANSVERSE C-SECTION: Primary | ICD-10-CM

## 2021-07-24 DIAGNOSIS — O46.93 VAGINAL BLEEDING IN PREGNANCY, THIRD TRIMESTER: ICD-10-CM

## 2021-07-24 PROBLEM — Z3A.35 35 WEEKS GESTATION OF PREGNANCY: Status: ACTIVE | Noted: 2021-05-18

## 2021-07-24 PROCEDURE — 4A1HXCZ MONITORING OF PRODUCTS OF CONCEPTION, CARDIAC RATE, EXTERNAL APPROACH: ICD-10-PCS | Performed by: OBSTETRICS & GYNECOLOGY

## 2021-07-24 RX ORDER — ONDANSETRON 2 MG/ML
4 INJECTION INTRAMUSCULAR; INTRAVENOUS EVERY 6 HOURS PRN
Status: DISCONTINUED | OUTPATIENT
Start: 2021-07-24 | End: 2021-08-05

## 2021-07-24 RX ORDER — SODIUM CHLORIDE, SODIUM LACTATE, POTASSIUM CHLORIDE, CALCIUM CHLORIDE 600; 310; 30; 20 MG/100ML; MG/100ML; MG/100ML; MG/100ML
100 INJECTION, SOLUTION INTRAVENOUS CONTINUOUS
Status: DISCONTINUED | OUTPATIENT
Start: 2021-07-24 | End: 2021-07-26

## 2021-07-25 LAB
ABO GROUP BLD: NORMAL
BASOPHILS # BLD AUTO: 0.03 THOUSANDS/ΜL (ref 0–0.1)
BASOPHILS NFR BLD AUTO: 0 % (ref 0–1)
BLD GP AB SCN SERPL QL: NEGATIVE
EOSINOPHIL # BLD AUTO: 0.12 THOUSAND/ΜL (ref 0–0.61)
EOSINOPHIL NFR BLD AUTO: 1 % (ref 0–6)
ERYTHROCYTE [DISTWIDTH] IN BLOOD BY AUTOMATED COUNT: 18.3 % (ref 11.6–15.1)
HCT VFR BLD AUTO: 32.3 % (ref 34.8–46.1)
HGB BLD-MCNC: 10.4 G/DL (ref 11.5–15.4)
IMM GRANULOCYTES # BLD AUTO: 0.11 THOUSAND/UL (ref 0–0.2)
IMM GRANULOCYTES NFR BLD AUTO: 1 % (ref 0–2)
LYMPHOCYTES # BLD AUTO: 3.53 THOUSANDS/ΜL (ref 0.6–4.47)
LYMPHOCYTES NFR BLD AUTO: 35 % (ref 14–44)
MCH RBC QN AUTO: 27.9 PG (ref 26.8–34.3)
MCHC RBC AUTO-ENTMCNC: 32.2 G/DL (ref 31.4–37.4)
MCV RBC AUTO: 87 FL (ref 82–98)
MONOCYTES # BLD AUTO: 0.85 THOUSAND/ΜL (ref 0.17–1.22)
MONOCYTES NFR BLD AUTO: 9 % (ref 4–12)
NEUTROPHILS # BLD AUTO: 5.33 THOUSANDS/ΜL (ref 1.85–7.62)
NEUTS SEG NFR BLD AUTO: 54 % (ref 43–75)
NRBC BLD AUTO-RTO: 0 /100 WBCS
PLATELET # BLD AUTO: 228 THOUSANDS/UL (ref 149–390)
PMV BLD AUTO: 10.8 FL (ref 8.9–12.7)
RBC # BLD AUTO: 3.73 MILLION/UL (ref 3.81–5.12)
RH BLD: POSITIVE
SPECIMEN EXPIRATION DATE: NORMAL
WBC # BLD AUTO: 9.97 THOUSAND/UL (ref 4.31–10.16)

## 2021-07-25 PROCEDURE — 99253 IP/OBS CNSLTJ NEW/EST LOW 45: CPT | Performed by: OBSTETRICS & GYNECOLOGY

## 2021-07-25 PROCEDURE — NC001 PR NO CHARGE: Performed by: OBSTETRICS & GYNECOLOGY

## 2021-07-25 PROCEDURE — 99214 OFFICE O/P EST MOD 30 MIN: CPT

## 2021-07-25 PROCEDURE — 85025 COMPLETE CBC W/AUTO DIFF WBC: CPT | Performed by: OBSTETRICS & GYNECOLOGY

## 2021-07-25 PROCEDURE — 86850 RBC ANTIBODY SCREEN: CPT | Performed by: OBSTETRICS & GYNECOLOGY

## 2021-07-25 PROCEDURE — 76815 OB US LIMITED FETUS(S): CPT | Performed by: OBSTETRICS & GYNECOLOGY

## 2021-07-25 PROCEDURE — 86900 BLOOD TYPING SEROLOGIC ABO: CPT | Performed by: OBSTETRICS & GYNECOLOGY

## 2021-07-25 PROCEDURE — 86901 BLOOD TYPING SEROLOGIC RH(D): CPT | Performed by: OBSTETRICS & GYNECOLOGY

## 2021-07-25 RX ADMIN — SODIUM CHLORIDE, SODIUM LACTATE, POTASSIUM CHLORIDE, AND CALCIUM CHLORIDE 100 ML/HR: .6; .31; .03; .02 INJECTION, SOLUTION INTRAVENOUS at 06:14

## 2021-07-25 RX ADMIN — IRON SUCROSE 200 MG: 20 INJECTION, SOLUTION INTRAVENOUS at 14:27

## 2021-07-25 RX ADMIN — SODIUM CHLORIDE, SODIUM LACTATE, POTASSIUM CHLORIDE, AND CALCIUM CHLORIDE 100 ML/HR: .6; .31; .03; .02 INJECTION, SOLUTION INTRAVENOUS at 03:36

## 2021-07-25 RX ADMIN — SODIUM CHLORIDE, SODIUM LACTATE, POTASSIUM CHLORIDE, AND CALCIUM CHLORIDE 999.9 ML/HR: .6; .31; .03; .02 INJECTION, SOLUTION INTRAVENOUS at 02:40

## 2021-07-25 NOTE — CONSULTS
Consultation - Maternal Fetal Medicine   Summer Sai Desaia Eth 30 y o  female MRN: [id number]  Unit/Bed#: -01 Encounter: [id number]    Assessment/Plan   Ms Stephen Nicolas is a 30y o  year-old [place] at [address] Day: 2, readmitted for vaginal bleeding  By issue: * Vaginal bleeding in pregnancy, third trimester  Assessment & Plan  Please see my note from  (day of discharge) during her hospitalization when she was admitted on   After discharge home she did well but re-presented this morning with vaginal bleeding  Patient Vitals for the past 24 hrs:   BP Temp Temp src Pulse Resp SpO2 Height Weight   21 0913 110/65 98 4 °F (36 9 °C) Oral 98 18 98 % -- --   21 104/66 98 2 °F (36 8 °C) Oral 102 16 98 % 5' (1 524 m) 78 9 kg (174 lb)   21 -- -- -- (!) 106 -- -- -- --   21 109/64 98 4 °F (36 9 °C) Oral 102 16 100 % -- --     NST 21 Time: 0805 - present (9:17 AM)    Baseline: 140  Variability: moderate  Accelerations: present, 15x15  Decelerations: absent  Contractions: occasional, infrequent, not regular  Assessment: reactive; not suggestive of abruption  Plan: ok to continue currently; later anticipate switch to TID/PRN    Recent Labs     21  0236   HGB 10 4*     Lab Results   Component Value Date    FERRITIN 17 2021     Can give IV iron dose inpatient  Continue IV access and add second line today with IV iron infusion  She is being managed as a "chronic abruption" despite absence of fetal monitoring data to suggest that she is abrupting - vaginal bleeding at this time is of unknown etiology  Bleeding is not ongoing and she is hemodynamically stable  Therefore can plan delivery at 37 weeks - via  given prior - or sooner if clinical status changes  May consider transvaginal ultrasound tomorrow depending on clinical course over the day today    Chief to do JIHAN today given pt complaint of blood down leg last night with minimal blood on admission  History of  delivery  Assessment & Plan    She is planning a repeat  delivery which may be able to occur at 37 weeks but I advised inpatient until delivery  Currently it is scheduled for 21 which is 36w5d, this may need to be changed pending clinical course inpatient  History of Present Illness     Chief Complaint: Bleeding    Dear Dr Alexa Ritchie,    Thank you for referring patient Barbie Melendez for Maternal-Fetal Medicine consultation regarding bleeding  HPI: As you know, Ms Gordo Shah is a 30y o  year-old  with an RUEL of 2021, by Last Menstrual Period at [address] Day: 2, admitted for bleeding in the setting of prior   Her current obstetrical history is significant for recent admission 7/15- for an episode bleeding which did not recur inpatient  Other history is as follows:    Historical Information   OB History    Para Term  AB Living   2 1 1     1   SAB TAB Ectopic Multiple Live Births           1      # Outcome Date GA Lbr Kt/2nd Weight Sex Delivery Anes PTL Lv   2 Current            1 Term 14 42w0d  4082 g (9 lb) F CS-LTranv Spinal N АНДРЕЙ      Complications: Failure to Progress in Second Stage       No past medical history on file  Past Surgical History:   Procedure Laterality Date     SECTION       Social History   Social History     Substance and Sexual Activity   Alcohol Use Never     Social History     Substance and Sexual Activity   Drug Use Never     Social History     Tobacco Use   Smoking Status Never Smoker   Smokeless Tobacco Never Used     Meds/Allergies   Prior to Admission Medications   Prescriptions Last Dose Informant Patient Reported? Taking?    Ascorbic Acid (vitamin C) 1000 MG tablet 2021 at 0900 Self No Yes   Sig: Take 1 tablet (1,000 mg total) by mouth 2 (two) times a day   Multiple Vitamin (multivitamin) tablet 7/24/2021 at 0900 Self No Yes   Sig: Take 1 tablet by mouth daily   amoxicillin (AMOXIL) 875 mg tablet 7/24/2021 at 0900  No Yes   Sig: Take 1 tablet (875 mg total) by mouth 2 (two) times a day for 5 days   cholecalciferol (VITAMIN D3) 1,000 units tablet 7/24/2021 at 0900 Self No Yes   Sig: Take 2 tablets (2,000 Units total) by mouth daily      Facility-Administered Medications: None     Medication Administration - last 24 hours from 07/24/2021 0934 to 07/25/2021 7499       Date/Time Order Dose Route Action Action by     07/25/2021 0614 lactated ringers infusion 100 mL/hr Intravenous New 178 Meagan Cedillo, RN     07/25/2021 9480 lactated ringers infusion 0 mL/hr Intravenous Stopped Stephanie HavanaWOOD fraire     07/25/2021 0336 lactated ringers infusion 100 mL/hr Intravenous New 178 Meagan Cedillo, RN     07/25/2021 0240 lactated ringers infusion 999 9 mL/hr Intravenous New Bag Raimundo Villalta RN        Current Facility-Administered Medications   Medication Dose Route Frequency    lactated ringers infusion  100 mL/hr Intravenous Continuous    ondansetron (ZOFRAN) injection 4 mg  4 mg Intravenous Q6H PRN     No Known Allergies    Objective   Patient Vitals for the past 24 hrs:   BP Temp Temp src Pulse Resp SpO2 Height Weight   07/25/21 0913 110/65 98 4 °F (36 9 °C) Oral 98 18 98 % -- --   07/24/21 2138 104/66 98 2 °F (36 8 °C) Oral 102 16 98 % 5' (1 524 m) 78 9 kg (174 lb)   07/24/21 2137 -- -- -- (!) 106 -- -- -- --   07/24/21 2059 109/64 98 4 °F (36 9 °C) Oral 102 16 100 % -- --     Vitals: Blood pressure 110/65, pulse 98, temperature 98 4 °F (36 9 °C), temperature source Oral, resp  rate 18, height 5' (1 524 m), weight 78 9 kg (174 lb), last menstrual period 11/21/2020, SpO2 98 %  Body mass index is 33 98 kg/m²    Constitutional: well-developed, well-nourished, in no acute distress, with vitals documented above  Neuro: awake, alert and oriented x3  Pulmonary exam: symmetric air entry, clear to auscultation bilaterally, without use of accessory respiratory muscles, without rhonchi crackles or rales   Cardiovascular exam: regular rate and rhythm, with a normal S1 and S2, without S3 or S4, with no murmurs rubs or gallops   Abdominal exam: soft and nontender throughout, with no rebound or guarding, gravid  Extremity exam: nonedematous, symmetric, and nontender bilaterally  Invasive Devices     Peripheral Intravenous Line            Peripheral IV 07/25/21 Right Hand <1 day              At the conclusion of today's encounter (which was conducted entirely via [employer] telephone  present), all questions were answered to her satisfaction  Thank you very much for this kind referral and please do not hesitate to contact me with any further questions or concerns      Sincerely,    Kayla Huerta MD  Attending Physician, [place]

## 2021-07-25 NOTE — H&P
H&P Exam - Obstetrics   General Perez 30 y o  female MRN: [id number]  Unit/Bed#: LD TRIAGE 4 Encounter: [id number]    ASSESSMENT:  31yo  at 35w0d weeks gestation with chronic placental abruption diagnosed at an outside hospital who is being admitted after vaginal bleeding with passage of clots at home earlier tonight; not actively bleeding at this time  NST reactive  Status post full course of betamethasone  PLAN:  Pregnancy at 35w0d  Admit  Follow up CBC, Type andScreen  Continuous fetal monitoring  GBS positive  To be delivered by repeat low transverse  section with bilateral salpingectomy    Plan of care discussed with Dr Hunter Watkins  This patient will be an INPATIENT  and I certify the anticipated length of stay is >2 Midnights  History of Present Illness     Chief Complaint: Vaginal bleeding    HPI:  General Perez is a 30 y o   female with an RUEL of 2021, by Last Menstrual Period at 35w0d weeks gestation who presents after passage of clots and vaginal bleeding at home  Bleeding has since subsided and the pad she were on the way to the hospital completely dry  Contractions: no  Loss of fluid: no  Fetal movement: normal    ROS otherwise negative  Obstetrician:  Li Veloz    PREGNANCY COMPLICATIONS:   1) Placental abruption    OB History    Para Term  AB Living   2 1 1     1   SAB TAB Ectopic Multiple Live Births           1      # Outcome Date GA Lbr Kt/2nd Weight Sex Delivery Anes PTL Lv   2 Current            1 Term 14 42w0d  4082 g (9 lb) F CS-LTranv Spinal N АНДРЕЙ      Complications: Failure to Progress in Second Stage       Baby complications/comments:  EFW 5 lb 10 oz 2563 g (75 percentile) on 2021    Review of Systems  12-point ROS negative unless states in HPI  Historical Information   No past medical history on file    Past Surgical History:   Procedure Laterality Date     SECTION       Social History   Social History     Substance and Sexual Activity   Alcohol Use Never     Social History     Substance and Sexual Activity   Drug Use Never     Social History     Tobacco Use   Smoking Status Never Smoker   Smokeless Tobacco Never Used     Family History: non-contributory    Meds/Allergies      Medications Prior to Admission   Medication    amoxicillin (AMOXIL) 875 mg tablet    Ascorbic Acid (vitamin C) 1000 MG tablet    cholecalciferol (VITAMIN D3) 1,000 units tablet    Multiple Vitamin (multivitamin) tablet      No Known Allergies    OBJECTIVE:  Vitals: Blood pressure 104/66, pulse 102, temperature 98 2 °F (36 8 °C), temperature source Oral, resp  rate 16, height 5' (1 524 m), weight 78 9 kg (174 lb), last menstrual period 2020, SpO2 98 %  Body mass index is 33 98 kg/m²  Physical Exam  Vitals reviewed  Constitutional:       Appearance: Normal appearance  She is obese  Pulmonary:      Effort: Pulmonary effort is normal    Abdominal:      Palpations: Abdomen is soft  Tenderness: There is no abdominal tenderness  There is no guarding  Genitourinary:     Comments: Cervix were grossly normal   Evidence of old bleeding  Not actively bleeding  Musculoskeletal:         General: Normal range of motion  Skin:     General: Skin is dry  Neurological:      Mental Status: She is alert  Psychiatric:         Mood and Affect: Mood normal          Behavior: Behavior normal           Fetal heart rate:   Baseline Rate: 150 bpm  Variability: Moderate 6-25 bpm  Accelerations: 15 x 15 or greater  Decelerations: None  FHR Category: Category I  Pryor:   Contraction Frequency (minutes): occasional  Contraction Duration (seconds):   Contraction Quality: Mild  GBS: positive  Prenatal Labs: I have personally reviewed pertinent reports  Invasive Devices     None                     Krupa Olivera MD  PGY-II, OBGYN  2021, 11:05 PM

## 2021-07-25 NOTE — ASSESSMENT & PLAN NOTE
She is planning a repeat  delivery which may be able to occur at 37 weeks but I advised inpatient until delivery  Currently it is scheduled for 21 which is 36w5d, this may need to be changed pending clinical course inpatient

## 2021-07-25 NOTE — ASSESSMENT & PLAN NOTE
Please see my note from  (day of discharge) during her hospitalization when she was admitted on   After discharge home she did well but re-presented this morning with vaginal bleeding  Patient Vitals for the past 24 hrs:   BP Temp Temp src Pulse Resp SpO2 Height Weight   21 0913 110/65 98 4 °F (36 9 °C) Oral 98 18 98 % -- --   21 104/66 98 2 °F (36 8 °C) Oral 102 16 98 % 5' (1 524 m) 78 9 kg (174 lb)   21 -- -- -- (!) 106 -- -- -- --   21 109/64 98 4 °F (36 9 °C) Oral 102 16 100 % -- --     NST 21 Time: 0805 - present (9:17 AM)    Baseline: 140  Variability: moderate  Accelerations: present, 15x15  Decelerations: absent  Contractions: occasional, infrequent, not regular  Assessment: reactive; not suggestive of abruption  Plan: ok to continue currently; later anticipate switch to TID/PRN    Recent Labs     21  0236   HGB 10 4*     Lab Results   Component Value Date    FERRITIN 17 2021     Can give IV iron dose inpatient  Continue IV access and add second line today with IV iron infusion  She is being managed as a "chronic abruption" despite absence of fetal monitoring data to suggest that she is abrupting - vaginal bleeding at this time is of unknown etiology  Bleeding is not ongoing and she is hemodynamically stable  Therefore can plan delivery at 37 weeks - via  given prior - or sooner if clinical status changes  May consider transvaginal ultrasound tomorrow depending on clinical course over the day today  Chief to do JIHAN today given pt complaint of blood down leg last night with minimal blood on admission

## 2021-07-25 NOTE — CONSULTS
Consultation - [facility]  Uzma Berry manuelito Rodriguez 30 y o  female MRN: [id number]  Unit/Bed#: LD TRIAGE 4-01 Encounter: [id number]      Inpatient consult to Perinatology  Consult performed by: Gerry Gr MD  Consult ordered by: Gerry Gr MD          Chief Complaint   Patient presents with    Vaginal Bleeding     previously diagnosed placental abruption         Assessment/Plan     ASSESSMENT:  30 y o  Elesa Blocker with IUP at 35w1d  gestation with chronic placental abruption, now admitted for the third time with vaginal bleeding  PLAN:    1  Placental abruption   - Admit until delivery, likely 36-37 weeks or earlier if indicated  - Patient is stable now but if one further episode of vaginal bleeding, will deliver via RLTCS with BS (MA-31 form signed in clinic on 5/10/21)   - Continuous fetal monitoring for now  - Hemoglobin 10 2, Type and screen done     2  IUP at 35w1d  - S[facility] (discharged 7/14/21) for vaginal bleeding   - GBS positive   - Growth scan done here on 7/16 showed EFW 2563g (75%) with anterior placenta  3  FEN  - NPO for now until bleeding reevaluated today     4  DVT ppx: SCDs        History of Present Illness   Physician Requesting Consult: Dr Junie Mcfadden   Reason for Consult / Principal Problem: Vaginal bleeding   Subspeciality: Perinatology    HPI:  Matias Schaumann is a 30 y o  Elesa Blocker female with an RUEL of 8/28/2021, by Last Menstrual Period at 35w1d gestation who is being admitted for vaginal bleeding in the setting of chronic abruption  She was first admitted to [facility] in [place] from 7/13-1/14 and received a full course of Betamethasone  She wsa then admitted to [facility] from 7/15-7/19 and ultimately discharge when bleeding was stable  She once again presents with vaginal bleeding at home with passage of plum sized clots  She denies any abdominal or pelvic pain  No leakage of flood and reports good fetal movement         Review of Systems  All systems reviewed are negative aside from above     PREGNANCY COMPLICATIONS: placental abruption, prior  in , obesity, h/o COVID     OB History    Para Term  AB Living   2 1 1     1   SAB TAB Ectopic Multiple Live Births           1      # Outcome Date GA Lbr Kt/2nd Weight Sex Delivery Anes PTL Lv   2 Current            1 Term 14 42w0d  4082 g (9 lb) F CS-LTranv Spinal N АНДРЕЙ      Complications: Failure to Progress in Second Stage       Baby complications/comments: None    Historical Information   No past medical history on file  Past Surgical History:   Procedure Laterality Date     SECTION       Social History   Social History     Substance and Sexual Activity   Alcohol Use Never     Social History     Substance and Sexual Activity   Drug Use Never     Social History     Tobacco Use   Smoking Status Never Smoker   Smokeless Tobacco Never Used     Family History: non-contributory    Meds/Allergies      Medications Prior to Admission   Medication    [] amoxicillin (AMOXIL) 875 mg tablet    Ascorbic Acid (vitamin C) 1000 MG tablet    cholecalciferol (VITAMIN D3) 1,000 units tablet    Multiple Vitamin (multivitamin) tablet      No Known Allergies    OBJECTIVE:    Vitals: Blood pressure 104/66, pulse 102, temperature 98 2 °F (36 8 °C), temperature source Oral, resp  rate 16, height 5' (1 524 m), weight 78 9 kg (174 lb), last menstrual period 2020, SpO2 98 %  Body mass index is 33 98 kg/m²  Physical Exam  Constitutional:       General: She is not in acute distress  Appearance: She is not ill-appearing or toxic-appearing  HENT:      Head: Normocephalic and atraumatic  Pulmonary:      Effort: Pulmonary effort is normal  No respiratory distress  Abdominal:      General: There is no distension  Palpations: Abdomen is soft  Tenderness: There is no abdominal tenderness  There is no guarding or rebound     Genitourinary:     General: Normal vulva  Comments: Speculum exam revealed small amount of blood in vaginal canal, no active bleeding   Skin:     General: Skin is warm and dry  Neurological:      General: No focal deficit present  Mental Status: She is alert and oriented to person, place, and time  Mental status is at baseline  Psychiatric:         Thought Content:  Thought content normal          Judgment: Judgment normal            Fetal heart rate: 140 BPM, reactive with moderate variability   Porum: None                 Jeison Colby MD  7/25/2021  12:39 AM

## 2021-07-25 NOTE — PROGRESS NOTES
Hospital day 3 of t his admission for this antepartum patient  Admitted with ongoing vaginal bleeding, now at 35 w 1 d  Was discharged with same earlier this week  No evidence of abruption on US but no other obvious cause for bleeding, so presumed chronic abruption        PMH:  No past medical history on file  PSH:  Past Surgical History:   Procedure Laterality Date     SECTION           Meds:  No current facility-administered medications on file prior to encounter  Current Outpatient Medications on File Prior to Encounter   Medication Sig Dispense Refill    [] amoxicillin (AMOXIL) 875 mg tablet Take 1 tablet (875 mg total) by mouth 2 (two) times a day for 5 days 10 tablet 0    Ascorbic Acid (vitamin C) 1000 MG tablet Take 1 tablet (1,000 mg total) by mouth 2 (two) times a day 60 tablet 0    cholecalciferol (VITAMIN D3) 1,000 units tablet Take 2 tablets (2,000 Units total) by mouth daily 60 tablet 0    Multiple Vitamin (multivitamin) tablet Take 1 tablet by mouth daily 30 tablet 0       Allergies:  No Known Allergies    Physical Exam:  /65 (BP Location: Left arm)   Pulse 98   Temp 98 4 °F (36 9 °C) (Oral)   Resp 18   Ht 5' (1 524 m)   Wt 78 9 kg (174 lb)   LMP 2020 (Exact Date)   SpO2 98%   BMI 33 98 kg/m²     Abdomen soft nontender, appropriate for gestational age no contractions fht cat 1  Heart RRR  Lungs clear  Ext without edema, bernard's or familia' signs,   Neuro  a/a/o x 3            Assessment: /Plan    35 w 1 d iup with ongoing vaginal bleeding, believed to be due to chronic abruption  Hx prior c/section and desires repeat    Plan will be for 37 weeks unless change in maternal or fetal status in interim to force earlier delivery  S/p YAHIR

## 2021-07-25 NOTE — PROGRESS NOTES
Pt's primary language is [place]  Check in done via [employer]  [address] [facility] #[id number]

## 2021-07-25 NOTE — PROCEDURES
Debra De Leon, guzman [place] at 35w1d with an RUEL of 8/28/2021, by Last Menstrual Period, was seen at [address] for the following procedure(s): $Procedure Type: JIHAN]         4 Quadrant JIHAN  JIHAN Q1 (cm): 2 1 cm  JIHAN Q2 (cm): 1 8 cm  JIHAN Q3 (cm): 4 1 cm  JIHAN Q4 (cm): 3 7 cm  JIHAN TOTAL (cm): 11 7 cm              Interpretation  Nonstress Test Interpretation: Gerardo Ortega MD

## 2021-07-26 LAB
BASOPHILS # BLD AUTO: 0.01 THOUSANDS/ΜL (ref 0–0.1)
BASOPHILS NFR BLD AUTO: 0 % (ref 0–1)
EOSINOPHIL # BLD AUTO: 0.07 THOUSAND/ΜL (ref 0–0.61)
EOSINOPHIL NFR BLD AUTO: 1 % (ref 0–6)
ERYTHROCYTE [DISTWIDTH] IN BLOOD BY AUTOMATED COUNT: 18.6 % (ref 11.6–15.1)
HCT VFR BLD AUTO: 33.3 % (ref 34.8–46.1)
HGB BLD-MCNC: 10.6 G/DL (ref 11.5–15.4)
IMM GRANULOCYTES # BLD AUTO: 0.07 THOUSAND/UL (ref 0–0.2)
IMM GRANULOCYTES NFR BLD AUTO: 1 % (ref 0–2)
LYMPHOCYTES # BLD AUTO: 3.12 THOUSANDS/ΜL (ref 0.6–4.47)
LYMPHOCYTES NFR BLD AUTO: 35 % (ref 14–44)
MCH RBC QN AUTO: 27.8 PG (ref 26.8–34.3)
MCHC RBC AUTO-ENTMCNC: 31.8 G/DL (ref 31.4–37.4)
MCV RBC AUTO: 87 FL (ref 82–98)
MONOCYTES # BLD AUTO: 0.67 THOUSAND/ΜL (ref 0.17–1.22)
MONOCYTES NFR BLD AUTO: 8 % (ref 4–12)
NEUTROPHILS # BLD AUTO: 4.92 THOUSANDS/ΜL (ref 1.85–7.62)
NEUTS SEG NFR BLD AUTO: 55 % (ref 43–75)
NRBC BLD AUTO-RTO: 0 /100 WBCS
PLATELET # BLD AUTO: 217 THOUSANDS/UL (ref 149–390)
PMV BLD AUTO: 11.6 FL (ref 8.9–12.7)
RBC # BLD AUTO: 3.81 MILLION/UL (ref 3.81–5.12)
WBC # BLD AUTO: 8.86 THOUSAND/UL (ref 4.31–10.16)

## 2021-07-26 PROCEDURE — NC001 PR NO CHARGE: Performed by: OBSTETRICS & GYNECOLOGY

## 2021-07-26 PROCEDURE — 76817 TRANSVAGINAL US OBSTETRIC: CPT | Performed by: OBSTETRICS & GYNECOLOGY

## 2021-07-26 PROCEDURE — 99233 SBSQ HOSP IP/OBS HIGH 50: CPT | Performed by: OBSTETRICS & GYNECOLOGY

## 2021-07-26 PROCEDURE — 76815 OB US LIMITED FETUS(S): CPT | Performed by: OBSTETRICS & GYNECOLOGY

## 2021-07-26 PROCEDURE — 85025 COMPLETE CBC W/AUTO DIFF WBC: CPT | Performed by: OBSTETRICS & GYNECOLOGY

## 2021-07-26 PROCEDURE — 59025 FETAL NON-STRESS TEST: CPT | Performed by: OBSTETRICS & GYNECOLOGY

## 2021-07-26 RX ORDER — ACETAMINOPHEN 325 MG/1
650 TABLET ORAL EVERY 6 HOURS PRN
Status: DISCONTINUED | OUTPATIENT
Start: 2021-07-26 | End: 2021-08-05

## 2021-07-26 RX ORDER — CALCIUM CARBONATE 200(500)MG
500 TABLET,CHEWABLE ORAL DAILY PRN
Status: DISCONTINUED | OUTPATIENT
Start: 2021-07-26 | End: 2021-08-05

## 2021-07-26 RX ADMIN — Medication 1 TABLET: at 14:19

## 2021-07-26 NOTE — UTILIZATION REVIEW
Initial Clinical Review    Admission: Date/Time/Statement:   Admission Orders (From admission, onward)     Ordered        21 2239  Inpatient Admission  Once                   Orders Placed This Encounter   Procedures    Inpatient Admission     Standing Status:   Standing     Number of Occurrences:   1     Order Specific Question:   Level of Care     Answer:   Med Surg [16]     Order Specific Question:   Estimated length of stay     Answer:   More than 2 Midnights     Order Specific Question:   Certification     Answer:   I certify that inpatient services are medically necessary for this patient for a duration of greater than two midnights  See H&P and MD Progress Notes for additional information about the patient's course of treatment  ED Arrival Information     Expected Arrival Acuity    2021 20:58 -         Means of arrival Escorted by Service Admission type    Walk-In Spouse OB/GYN Elective         Arrival complaint    VAGINAL BLEEDING (35 WKS PREGNANT)        Chief Complaint   Patient presents with    Vaginal Bleeding     previously diagnosed placental abruption       Initial Presentation: on 2021 36yo  at 35w0d weeks gestation with chronic placental abruption diagnosed at an outside hospital admit with 3rd event due to  Vaginal bleeding; vaginal bleeding with passage of clots occurred at home earlier tonight; not actively bleeding on arrival  NST reactive  Exam: Cervix were grossly normal   Evidence of old bleeding  Not actively bleeding  Status post full course of betamethasone  Admit labs; continuous fetal monitoring; consult [facility]     Date:    Day 2:   [facility] Placental abruption   - Admit until delivery, likely 36-37 weeks or earlier if indicated, currently stable but if one further episode of vaginal bleeding, will deliver via RLTCS with BS (MA-31 form signed in clinic on 5/10/21) EXAM: Speculum exam revealed small amount of blood in vaginal canal, no active bleeding - Continuous fetal monitoring, Hemoglobin 10 2, Type and screen done  BTM at [facility] admission DC  NPO until bleeding re evaluated  AM note MFM update: Continue IV access and add second line today with IV iron infusion  Managed as a "chronic abruption" despite absence of fetal monitoring data to suggest that she is abrupting - vaginal bleeding at this time is of unknown etiology  Bleeding is not ongoing and hemodynamically stable  Plan delivery at 37 weeks - via  given prior - or sooner if clinical status changes  DATE  DAY 3  MFM   30 y o   at 35w2d admitted with suspected chronic placental abruption,  third time with vaginal bleeding, continue inpatient  until delivery   A transvaginal ultrasound  performed to assess for placenta previa  No placenta previa is noted  Some fluid high within the cervical canal   This likely represents a clot  Unclear where bleeding is coming from  Placenta is grade 2  Cannot fully rule out an accreta  placenta is not low-lying  Hgb10 4-->10 6 this AM, s/p two doses of Venofer on last admission   - Plan for delivery via RLTCS with BS at 37 weeks or earlier if clinically indicated   - NST TID     Triage Vitals   Temperature Pulse Respirations Blood Pressure SpO2   21   98 4 °F (36 9 °C) 102 16 109/64 100 %      Temp Source Heart Rate Source Patient Position - Orthostatic VS BP Location FiO2 (%)   21 --   Oral Monitor Lying Right arm       Pain Score       21       No Pain          Wt Readings from Last 1 Encounters:   21 78 9 kg (174 lb)     Additional Vital Signs:   Date/Time  Temp  Pulse  Resp  BP  SpO2  O2 Device  Cardiac (WDL)  Patient Position - Orthostatic VS   21 1000  --  --  --  --  --  --  --  --   Comment rows:   OBSERV: according to the patient, baby is active, denies s/s of labor   at 21 1000 07/26/21 0830  98 5 °F (36 9 °C)  99  20  112/72  99 %  --  --  --   07/26/21 0430  98 4 °F (36 9 °C)  87  18  104/58  98 %  None (Room air)  --  Lying   07/26/21 0024  98 °F (36 7 °C)  89  18  110/52  98 %  None (Room air)  --  Lying   07/25/21 2201  --  --  --  --  --  None (Room air)  WDL  --   07/25/21 2100  98 2 °F (36 8 °C)  98  18  115/64  98 %  None (Room air)  --  Lying   07/25/21 1700  98 5 °F (36 9 °C)  93  18  112/62  96 %  None (Room air)  --  Lying   07/25/21 1248  98 4 °F (36 9 °C)  95  18  101/59  95 %  None (Room air)  --  Lying   07/25/21 1000  --  --  --  --  --  None (Room air)  --  --   07/25/21 0913  98 4 °F (36 9 °C)  98  18  110/65  98 %  None (Room air)  --  Lying   07/25/21 0400  --  --  --  --  --  None (Room air)  WDL  --   07/24/21 2138  98 2 °F (36 8 °C)  102  16  104/66  98 %  --  --  Lying   07/24/21 2137  --  106Abnormal   --  --  --  --  --  --   07/24/21 2059  98 4 °F (36 9 °C)  102  16  109/64  100 %  --  --  --      Weights (last 14 days)    Date/Time  Weight  Height   07/24/21 2138  78 9 kg (174 lb)  5' (1 524 m)     Pertinent Labs/Diagnostic Test Results:       Results from last 7 days   Lab Units 07/26/21  0509 07/25/21  0236   WBC Thousand/uL 8 86 9 97   HEMOGLOBIN g/dL 10 6* 10 4*   HEMATOCRIT % 33 3* 32 3*   PLATELETS Thousands/uL 217 228   NEUTROS ABS Thousands/µL 4 92 5 33            ED Treatment:   Medication Administration from 07/24/2021 2058 to 07/26/2021 1438       Date/Time Order Dose Route Action     07/25/2021 2238 lactated ringers infusion 900 mL/hr Intravenous Restarted     07/25/2021 4504 lactated ringers infusion 100 mL/hr Intravenous New Bag     07/25/2021 0336 lactated ringers infusion 100 mL/hr Intravenous New Bag     07/25/2021 0240 lactated ringers infusion 999 9 mL/hr Intravenous New Bag     07/25/2021 1427 iron sucrose (VENOFER) 200 mg in sodium chloride 0 9 % 100 mL IVPB 200 mg Intravenous New Bag     07/26/2021 1419 prenatal multivitamin tablet 1 tablet 1 tablet Oral Given        No past medical history on file  Present on Admission:   Vaginal bleeding in pregnancy, third trimester      Admitting Diagnosis: Vaginal bleeding in pregnancy [O46 90]  35 weeks gestation of pregnancy [Z3A 35]  35 weeks gestation of pregnancy [Z3A 35]  Age/Sex: 30 y o  female  Admission Orders:  Continual uterine contraction & fetal HR monitoring   NST TID     Scheduled Medications:  prenatal multivitamin, 1 tablet, Oral, Daily      Continuous IV Infusions:   IV lactated ringers infusion 7/25 0240 DC 7/26 1249  Rate: 100 mL/hr Dose: 100 mL/hr  Freq: Continuous Route: IV    PRN Meds:  acetaminophen, 650 mg, Oral, Q6H PRN  calcium carbonate, 500 mg, Oral, Daily PRN  ondansetron, 4 mg, Intravenous, Q6H PRN  IP CONSULT TO PERINATOLOGY  Network Utilizaton Review Department  ATTENTION: Please call with any questions or concerns to [phone] and carefully listen to the prompts so that you are directed to the right person  All voicemails are confidential   Gael Mccormick all requests for admission clinical reviews, approved or denied determinations and any other requests to dedicated fax number below belonging to the campus where the patient is receiving treatment   List of dedicated fax numbers for the Facilities:  [address] DENIALS (Administrative/Medical Necessity) [phone]   [address] (Maternity/NICU/Pediatrics) [phone]   [address] Abdi Petersen [address] [address] [phone]   [place] [address] [place] [address] [phone]

## 2021-07-26 NOTE — PROGRESS NOTES
Progress Note - Maternal Fetal Medicine   Stephanie Nam 30 y o  female MRN: [id number]  Unit/Bed#: -01 Encounter: [id number]    Assessment:  30 y o  Kam Cueva at 35w2d admitted with suspected chronic placental abruption, admitted for the third time with vaginal bleeding, now admitted until delivery  Plan:    1  Chronic suspected abruption  - Previous KB negative on last admission  - A positive  - Hgb10 4-->10 6 this AM, s/p two doses of Venofer on last admission   - Plan for delivery via RLTCS with BS at 37 weeks or earlier if clinically indicated   - NST TID    2  IUP at 35w2d  - Last growth scan was 7/16/21 with EWF 75%, anterior placenta, nop previa  - Plan for scan and TVUS today  - S/p BTM 7/13 - 7/14 at [facility]     3  DVT ppx: SCDs    4  FEN: Regular diet         Subjective:      *[place]se interpretor used*    Patient states that bleeding has been minimal overnight and no clots  She is feeling some occasional tightening of the abdomen when the baby moves, but otherwise no regular contractions  Denies LOF and reports good fetal movement  She understands that she will be admitted until delivery and is ok with that plan  Objective:     Vitals:   Temp:  [98 °F (36 7 °C)-98 5 °F (36 9 °C)] 98 4 °F (36 9 °C)  HR:  [87-98] 87  Resp:  [18] 18  BP: (101-115)/(52-65) 104/58       Physical Exam  Constitutional:       General: She is not in acute distress  Appearance: She is well-developed  Pulmonary:      Effort: Pulmonary effort is normal  No respiratory distress  Abdominal:      Tenderness: There is no abdominal tenderness  There is no guarding  Comments: Gravid uterus   Musculoskeletal:         General: No tenderness  Neurological:      General: No focal deficit present  Mental Status: She is alert and oriented to person, place, and time  Mental status is at baseline     Psychiatric:         Mood and Affect: Mood normal          Behavior: Behavior normal          Thought Content:  Thought content normal          Judgment: Judgment normal        Fetal Assessment:  FHT: 155 BPM, reactive with moderate variability   [place]: Occasional contractions         Lab Results   Component Value Date    WBC 8 86 07/26/2021    HGB 10 6 (L) 07/26/2021    HCT 33 3 (L) 07/26/2021    MCV 87 07/26/2021     07/26/2021       Lab Results   Component Value Date    CALCIUM 8 9 07/15/2021    K 3 8 07/15/2021    CO2 17 (L) 07/15/2021     07/15/2021    BUN 7 07/15/2021    CREATININE 0 71 07/15/2021       Lab Results   Component Value Date    ALT 16 07/15/2021    AST 15 07/15/2021    ALKPHOS 132 (H) 07/15/2021       Kevon Morrison MD  OBGYN, PGY-3  7/26/2021  7:03 AM

## 2021-07-26 NOTE — PROGRESS NOTES
[address] [address] Attending:     I met with the patient in person  History and physical as documented  I reviewed the care of the patient  I supervised the evaluation and management documented, including all key and critical components  S/Small of blood on toilet tissue  Rare cramp   (+)FM  O/98 5F, /72, HR 99  Alert comfortable, NAD  Fundus NT    Lab/    A+/-    Hgb 10 4 -> 10 6    Plates 610     A/P  33yo  at 35 2 weeks gestational age with chronic abruption  (1) Chronic abruption  Anterior placenta, no previa  History of prior  section  Presented with bleeding again  Small amount  Hgb stable  --> Continue inpatient observation until delivery  --> For repeat MFM sono today   --> For delivery at 37 if undelivered  (2) Prematurity  35 2 weeks  Got betamethasone  and    --> NICU to attend delivery  (3) History of prior  section  LTCS  Has been counseled on options and wants RCS   --> For RCs for delivery  (4) Fetal status reassuring   --> Continue NST tid      Lady Amna MD

## 2021-07-27 PROCEDURE — 59025 FETAL NON-STRESS TEST: CPT | Performed by: OBSTETRICS & GYNECOLOGY

## 2021-07-27 PROCEDURE — NC001 PR NO CHARGE: Performed by: OBSTETRICS & GYNECOLOGY

## 2021-07-27 PROCEDURE — 99232 SBSQ HOSP IP/OBS MODERATE 35: CPT | Performed by: OBSTETRICS & GYNECOLOGY

## 2021-07-27 RX ADMIN — Medication 1 TABLET: at 09:36

## 2021-07-27 NOTE — PLAN OF CARE
Problem: PAIN - ADULT  Goal: Verbalizes/displays adequate comfort level or baseline comfort level  Description: Interventions:  - Encourage patient to monitor pain and request assistance  - Assess pain using appropriate pain scale  - Administer analgesics based on type and severity of pain and evaluate response  - Implement non-pharmacological measures as appropriate and evaluate response  - Consider cultural and social influences on pain and pain management  - Notify physician/advanced practitioner if interventions unsuccessful or patient reports new pain  Outcome: Progressing     Problem: INFECTION - ADULT  Goal: Absence or prevention of progression during hospitalization  Description: INTERVENTIONS:  - Assess and monitor for signs and symptoms of infection  - Monitor lab/diagnostic results  - Monitor all insertion sites, i e  indwelling lines, tubes, and drains  - Monitor endotracheal if appropriate and nasal secretions for changes in amount and color  - [place] appropriate cooling/warming therapies per order  - Administer medications as ordered  - Instruct and encourage patient and family to use good hand hygiene technique  - Identify and instruct in appropriate isolation precautions for identified infection/condition  Outcome: Progressing  Goal: Absence of fever/infection during neutropenic period  Description: INTERVENTIONS:  - Monitor WBC    Outcome: Progressing     Problem: SAFETY ADULT  Goal: Patient will remain free of falls  Description: INTERVENTIONS:  - Educate patient/family on patient safety including physical limitations  - Instruct patient to call for assistance with activity   - Consult OT/PT to assist with strengthening/mobility   - Keep Call bell within reach  - Keep bed low and locked with side rails adjusted as appropriate  - Keep care items and personal belongings within reach  - Initiate and maintain comfort rounds  - Make Fall Risk Sign visible to staff  - Offer Toileting every  Hours, in advance of need  - Initiate/Maintain alarm  - Obtain necessary fall risk management equipment:   - Apply yellow socks and bracelet for high fall risk patients  - Consider moving patient to room near nurses station  Outcome: Progressing  Goal: Maintain or return to baseline ADL function  Description: INTERVENTIONS:  -  Assess patient's ability to carry out ADLs; assess patient's baseline for ADL function and identify physical deficits which impact ability to perform ADLs (bathing, care of mouth/teeth, toileting, grooming, dressing, etc )  - Assess/evaluate cause of self-care deficits   - Assess range of motion  - Assess patient's mobility; develop plan if impaired  - Assess patient's need for assistive devices and provide as appropriate  - Encourage maximum independence but intervene and supervise when necessary  - Involve family in performance of ADLs  - Assess for home care needs following discharge   - Consider OT consult to assist with ADL evaluation and planning for discharge  - Provide patient education as appropriate  Outcome: Progressing  Goal: Maintains/Returns to pre admission functional level  Description: INTERVENTIONS:  - Perform BMAT or MOVE assessment daily    - Set and communicate daily mobility goal to care team and patient/family/caregiver  - Collaborate with rehabilitation services on mobility goals if consulted  - Perform Range of Motion  times a day  - Reposition patient every  hours    - Dangle patient  times a day  - Stand patient times a day  - Ambulate patient  times a day  - Out of bed to chair  times a day   - Out of bed for meals times a day  - Out of bed for toileting  - Record patient progress and toleration of activity level   Outcome: Progressing     Problem: Knowledge Deficit  Goal: Patient/family/caregiver demonstrates understanding of disease process, treatment plan, medications, and discharge instructions  Description: Complete learning assessment and assess knowledge base   Interventions:  - Provide teaching at level of understanding  - Provide teaching via preferred learning methods  Outcome: Progressing     Problem: DISCHARGE PLANNING  Goal: Discharge to home or other facility with appropriate resources  Description: INTERVENTIONS:  - Identify barriers to discharge w/patient and caregiver  - Arrange for needed discharge resources and transportation as appropriate  - Identify discharge learning needs (meds, wound care, etc )  - Arrange for interpretive services to assist at discharge as needed  - Refer to Case Management Department for coordinating discharge planning if the patient needs post-hospital services based on physician/advanced practitioner order or complex needs related to functional status, cognitive ability, or social support system  Outcome: Progressing

## 2021-07-27 NOTE — PLAN OF CARE
Problem: PAIN - ADULT  Goal: Verbalizes/displays adequate comfort level or baseline comfort level  Description: Interventions:  - Encourage patient to monitor pain and request assistance  - Assess pain using appropriate pain scale  - Administer analgesics based on type and severity of pain and evaluate response  - Implement non-pharmacological measures as appropriate and evaluate response  - Consider cultural and social influences on pain and pain management  - Notify physician/advanced practitioner if interventions unsuccessful or patient reports new pain  Outcome: Progressing     Problem: INFECTION - ADULT  Goal: Absence or prevention of progression during hospitalization  Description: INTERVENTIONS:  - Assess and monitor for signs and symptoms of infection  - Monitor lab/diagnostic results  - Monitor all insertion sites, i e  indwelling lines, tubes, and drains  - Monitor endotracheal if appropriate and nasal secretions for changes in amount and color  - [place] appropriate cooling/warming therapies per order  - Administer medications as ordered  - Instruct and encourage patient and family to use good hand hygiene technique  - Identify and instruct in appropriate isolation precautions for identified infection/condition  Outcome: Progressing  Goal: Absence of fever/infection during neutropenic period  Description: INTERVENTIONS:  - Monitor WBC    Outcome: Progressing     Problem: SAFETY ADULT  Goal: Patient will remain free of falls  Description: INTERVENTIONS:  - Educate patient/family on patient safety including physical limitations  - Instruct patient to call for assistance with activity   - Consult OT/PT to assist with strengthening/mobility   - Keep Call bell within reach  - Keep bed low and locked with side rails adjusted as appropriate  - Keep care items and personal belongings within reach  - Initiate and maintain comfort rounds  - Make Fall Risk Sign visible to staff  -  - Apply yellow socks and bracelet for high fall risk patients  - Consider moving patient to room near nurses station  Outcome: Progressing  Goal: Maintain or return to baseline ADL function  Description: INTERVENTIONS:  -  Assess patient's ability to carry out ADLs; assess patient's baseline for ADL function and identify physical deficits which impact ability to perform ADLs (bathing, care of mouth/teeth, toileting, grooming, dressing, etc )  - Assess/evaluate cause of self-care deficits   - Assess range of motion  - Assess patient's mobility; develop plan if impaired  - Assess patient's need for assistive devices and provide as appropriate  - Encourage maximum independence but intervene and supervise when necessary  - Involve family in performance of ADLs  - Assess for home care needs following discharge   - Consider OT consult to assist with ADL evaluation and planning for discharge  - Provide patient education as appropriate  Outcome: Progressing  Goal: Maintains/Returns to pre admission functional level  Description: INTERVENTIONS:  - Perform BMAT or MOVE assessment daily    - Set and communicate daily mobility goal to care team and patient/family/caregiver  - COutcome: Progressing     Problem: Knowledge Deficit  Goal: Patient/family/caregiver demonstrates understanding of disease process, treatment plan, medications, and discharge instructions  Description: Complete learning assessment and assess knowledge base    Interventions:  - Provide teaching at level of understanding  - Provide teaching via preferred learning methods  Outcome: Progressing     Problem: DISCHARGE PLANNING  Goal: Discharge to home or other facility with appropriate resources  Description: INTERVENTIONS:  - Identify barriers to discharge w/patient and caregiver  - Arrange for needed discharge resources and transportation as appropriate  - Identify discharge learning needs (meds, wound care, etc )  - Arrange for interpretive services to assist at discharge as needed  - Refer to Case Management Department for coordinating discharge planning if the patient needs post-hospital services based on physician/advanced practitioner order or complex needs related to functional status, cognitive ability, or social support system  Outcome: Progressing

## 2021-07-27 NOTE — QUICK NOTE
30 y o   at 35w3d with suspected chronic placental abruption  Patient is resting comfortably without signs of labor  Ros is positive for FM and small amount of vaginal spotting  ROS is nagative for LOF, fever, Abdominal pain, or regular CTX  [place] presentation and [id number]  We will continue to follow with MFM      Lab Results   Component Value Date    WBC 8 86 2021    HGB 10 6 (L) 2021    HCT 33 3 (L) 2021    MCV 87 2021     2021

## 2021-07-28 PROCEDURE — 59025 FETAL NON-STRESS TEST: CPT | Performed by: OBSTETRICS & GYNECOLOGY

## 2021-07-28 PROCEDURE — 99231 SBSQ HOSP IP/OBS SF/LOW 25: CPT | Performed by: OBSTETRICS & GYNECOLOGY

## 2021-07-28 PROCEDURE — NC001 PR NO CHARGE: Performed by: OBSTETRICS & GYNECOLOGY

## 2021-07-28 RX ADMIN — Medication 1 TABLET: at 09:09

## 2021-07-28 NOTE — PLAN OF CARE
Problem: PAIN - ADULT  Goal: Verbalizes/displays adequate comfort level or baseline comfort level  Description: Interventions:  - Encourage patient to monitor pain and request assistance  - Assess pain using appropriate pain scale  - Administer analgesics based on type and severity of pain and evaluate response  - Implement non-pharmacological measures as appropriate and evaluate response  - Consider cultural and social influences on pain and pain management  - Notify physician/advanced practitioner if interventions unsuccessful or patient reports new pain  Outcome: Progressing     Problem: INFECTION - ADULT  Goal: Absence or prevention of progression during hospitalization  Description: INTERVENTIONS:  - Assess and monitor for signs and symptoms of infection  - Monitor lab/diagnostic results  - Monitor all insertion sites, i e  indwelling lines, tubes, and drains  - Monitor endotracheal if appropriate and nasal secretions for changes in amount and color  - [place] appropriate cooling/warming therapies per order  - Administer medications as ordered  - Instruct and encourage patient and family to use good hand hygiene technique  - Identify and instruct in appropriate isolation precautions for identified infection/condition  Outcome: Progressing  Goal: Absence of fever/infection during neutropenic period  Description: INTERVENTIONS:  - Monitor WBC    Outcome: Progressing     Problem: SAFETY ADULT  Goal: Patient will remain free of falls  Description: INTERVENTIONS:  - Educate patient/family on patient safety including physical limitations  - Instruct patient to call for assistance with activity   - Consult OT/PT to assist with strengthening/mobility   - Keep Call bell within reach  - Keep bed low and locked with side rails adjusted as appropriate  - Keep care items and personal belongings within reach  - Initiate and maintain comfort rounds  - Make Fall Risk Sign visible to staff  - Apply yellow socks and bracelet for high fall risk patients  - Consider moving patient to room near nurses station  Outcome: Progressing  Goal: Maintain or return to baseline ADL function  Description: INTERVENTIONS:  -  Assess patient's ability to carry out ADLs; assess patient's baseline for ADL function and identify physical deficits which impact ability to perform ADLs (bathing, care of mouth/teeth, toileting, grooming, dressing, etc )  - Assess/evaluate cause of self-care deficits   - Assess range of motion  - Assess patient's mobility; develop plan if impaired  - Assess patient's need for assistive devices and provide as appropriate  - Encourage maximum independence but intervene and supervise when necessary  - Involve family in performance of ADLs  - Assess for home care needs following discharge   - Consider OT consult to assist with ADL evaluation and planning for discharge  - Provide patient education as appropriate  Outcome: Progressing  Goal: Maintains/Returns to pre admission functional level  Description: INTERVENTIONS:  - Perform BMAT or MOVE assessment daily    - Set and communicate daily mobility goal to care team and patient/family/caregiver  - Collaborate with rehabilitation services on mobility goals if consulted  Outcome: Progressing     Problem: Knowledge Deficit  Goal: Patient/family/caregiver demonstrates understanding of disease process, treatment plan, medications, and discharge instructions  Description: Complete learning assessment and assess knowledge base    Interventions:  - Provide teaching at level of understanding  - Provide teaching via preferred learning methods  Outcome: Progressing     Problem: DISCHARGE PLANNING  Goal: Discharge to home or other facility with appropriate resources  Description: INTERVENTIONS:  - Identify barriers to discharge w/patient and caregiver  - Arrange for needed discharge resources and transportation as appropriate  - Identify discharge learning needs (meds, wound care, etc )  - Arrange for interpretive services to assist at discharge as needed  - Refer to Case Management Department for coordinating discharge planning if the patient needs post-hospital services based on physician/advanced practitioner order or complex needs related to functional status, cognitive ability, or social support system  Outcome: Progressing

## 2021-07-28 NOTE — PROGRESS NOTES
Progress Note - Maternal Fetal Medicine   Viviana Leavittsandra Roca Furrow 30 y o  female MRN: [id number]  Unit/Bed#: -01 Encounter: [id number]    Assessment:  30 y o  [place] at 35w4d with suspected chronic placental abruption, admitted for the third time with vaginal bleeding, will stay until delivery       Plan:     1  Chronic suspected abruption  - Previous KB negative on last admission  - A positive, last T&S 7/25/21--> T&S ordered for tomorrow   - Hgb10 4-->10 6, s/p two doses of Venofer on last admission  - NST TID   - Plan for delivery via RLTCS with BS on 8/5/21        2  IUP at 35w4d  - Last growth scan was 7/16/21 with EWF 75%, anterior placenta, no previa  - Repeat TVUS and TAUS 7/27 confirmed anterior placenta and no previa with JIHAN 13 5  - S/p B[facility] 7/13 - [facility]       3  DVT ppx: SCDs     4  FEN: Regular diet         Subjective:   Patient is not having any vaginal bleeding  Denies feeling any abdominal pain, contractions this morning, or leakage of fluid this morning  Excellent fetal movement  Objective:     Vitals:   Temp:  [97 9 °F (36 6 °C)-99 3 °F (37 4 °C)] 97 9 °F (36 6 °C)  HR:  [86-99] 95  Resp:  [18-20] 18  BP: ()/(52-75) 98/52       Physical Exam  Constitutional:       General: She is not in acute distress  Appearance: She is not ill-appearing or toxic-appearing  Comments: Lying in bed comfortably    HENT:      Head: Normocephalic and atraumatic  Pulmonary:      Effort: Pulmonary effort is normal  No respiratory distress  Neurological:      General: No focal deficit present  Mental Status: She is alert and oriented to person, place, and time  Mental status is at baseline  Psychiatric:         Mood and Affect: Mood normal          Thought Content:  Thought content normal          Judgment: Judgment normal          Fetal Assessment:  FHT:   NST this AM was 145 BPM, reactive with moderate variability and no decelerations  NST last night with periods of tachycardia 155-180, reactive with moderate variability and no decelerations     [place]: Occasional       Lab Results   Component Value Date    WBC 8 86 07/26/2021    HGB 10 6 (L) 07/26/2021    HCT 33 3 (L) 07/26/2021    MCV 87 07/26/2021     07/26/2021       Lab Results   Component Value Date    CALCIUM 8 9 07/15/2021    K 3 8 07/15/2021    CO2 17 (L) 07/15/2021     07/15/2021    BUN 7 07/15/2021    CREATININE 0 71 07/15/2021       Lab Results   Component Value Date    ALT 16 07/15/2021    AST 15 07/15/2021    ALKPHOS 132 (H) 07/15/2021       Oneyda Epperson MD  OBGYN, PGY-3  7/28/2021  7:53 AM

## 2021-07-28 NOTE — QUICK NOTE
Patient continues to do well with no vaginal bleeding or contractions  She feels the baby move  Patient is a 30 y o   at 35w4d, EFW 2563g on 21, with suspected chronic placental abruption   Plan is for RLTCS + BS on

## 2021-07-29 LAB
ABO GROUP BLD: NORMAL
BLD GP AB SCN SERPL QL: NEGATIVE
RH BLD: POSITIVE
SPECIMEN EXPIRATION DATE: NORMAL

## 2021-07-29 PROCEDURE — 76815 OB US LIMITED FETUS(S): CPT | Performed by: OBSTETRICS & GYNECOLOGY

## 2021-07-29 PROCEDURE — 86900 BLOOD TYPING SEROLOGIC ABO: CPT | Performed by: OBSTETRICS & GYNECOLOGY

## 2021-07-29 PROCEDURE — NC001 PR NO CHARGE: Performed by: OBSTETRICS & GYNECOLOGY

## 2021-07-29 PROCEDURE — 59025 FETAL NON-STRESS TEST: CPT | Performed by: OBSTETRICS & GYNECOLOGY

## 2021-07-29 PROCEDURE — 86901 BLOOD TYPING SEROLOGIC RH(D): CPT | Performed by: OBSTETRICS & GYNECOLOGY

## 2021-07-29 PROCEDURE — 86850 RBC ANTIBODY SCREEN: CPT | Performed by: OBSTETRICS & GYNECOLOGY

## 2021-07-29 PROCEDURE — 99231 SBSQ HOSP IP/OBS SF/LOW 25: CPT | Performed by: OBSTETRICS & GYNECOLOGY

## 2021-07-29 RX ADMIN — Medication 1 TABLET: at 11:31

## 2021-07-29 NOTE — PLAN OF CARE
Problem: PAIN - ADULT  Goal: Verbalizes/displays adequate comfort level or baseline comfort level  Description: Interventions:  - Encourage patient to monitor pain and request assistance  - Assess pain using appropriate pain scale  - Administer analgesics based on type and severity of pain and evaluate response  - Implement non-pharmacological measures as appropriate and evaluate response  - Consider cultural and social influences on pain and pain management  - Notify physician/advanced practitioner if interventions unsuccessful or patient reports new pain  Outcome: Progressing     Problem: INFECTION - ADULT  Goal: Absence or prevention of progression during hospitalization  Description: INTERVENTIONS:  - Assess and monitor for signs and symptoms of infection  - Monitor lab/diagnostic results  - Monitor all insertion sites, i e  indwelling lines, tubes, and drains  - Monitor endotracheal if appropriate and nasal secretions for changes in amount and color  - [place] appropriate cooling/warming therapies per order  - Administer medications as ordered  - Instruct and encourage patient and family to use good hand hygiene technique  - Identify and instruct in appropriate isolation precautions for identified infection/condition  Outcome: Progressing  Goal: Absence of fever/infection during neutropenic period  Description: INTERVENTIONS:  - Monitor WBC    Outcome: Progressing     Problem: SAFETY ADULT  Goal: Patient will remain free of falls  Description: INTERVENTIONS:  - Educate patient/family on patient safety including physical limitations  - Instruct patient to call for assistance with activity   - Consult OT/PT to assist with strengthening/mobility   - Keep Call bell within reach  - Keep bed low and locked with side rails adjusted as appropriate  - Keep care items and personal belongings within reach  - Initiate and maintain comfort rounds  - Make Fall Risk Sign visible to staff  - Apply yellow socks and bracelet for high fall risk patients  - Consider moving patient to room near nurses station  Outcome: Progressing  Goal: Maintain or return to baseline ADL function  Description: INTERVENTIONS:  -  Assess patient's ability to carry out ADLs; assess patient's baseline for ADL function and identify physical deficits which impact ability to perform ADLs (bathing, care of mouth/teeth, toileting, grooming, dressing, etc )  - Assess/evaluate cause of self-care deficits   - Assess range of motion  - Assess patient's mobility; develop plan if impaired  - Assess patient's need for assistive devices and provide as appropriate  - Encourage maximum independence but intervene and supervise when necessary  - Involve family in performance of ADLs  - Assess for home care needs following discharge   - Consider OT consult to assist with ADL evaluation and planning for discharge  - Provide patient education as appropriate  Outcome: Progressing  Goal: Maintains/Returns to pre admission functional level  Description: INTERVENTIONS:  - Perform BMAT or MOVE assessment daily    - Set and communicate daily mobility goal to care team and patient/family/caregiver  - Collaborate with rehabilitation services on mobility goals if consulted  - Out of bed for toileting  - Record patient progress and toleration of activity level   Outcome: Progressing     Problem: Knowledge Deficit  Goal: Patient/family/caregiver demonstrates understanding of disease process, treatment plan, medications, and discharge instructions  Description: Complete learning assessment and assess knowledge base    Interventions:  - Provide teaching at level of understanding  - Provide teaching via preferred learning methods  Outcome: Progressing     Problem: DISCHARGE PLANNING  Goal: Discharge to home or other facility with appropriate resources  Description: INTERVENTIONS:  - Identify barriers to discharge w/patient and caregiver  - Arrange for needed discharge resources and transportation as appropriate  - Identify discharge learning needs (meds, wound care, etc )  - Arrange for interpretive services to assist at discharge as needed  - Refer to Case Management Department for coordinating discharge planning if the patient needs post-hospital services based on physician/advanced practitioner order or complex needs related to functional status, cognitive ability, or social support system  Outcome: Progressing

## 2021-07-29 NOTE — PROGRESS NOTES
Progress Note - Maternal Fetal Medicine   Elda Rodriguez manuelito Barriga 30 y o  female MRN: [id number]  Unit/Bed#: -01 Encounter: [id number]    Assessment:  30 y o  Kadie [place] at 35w5d with suspected chronic placental abruption, admitted for the third time with vaginal bleeding, will stay until delivery       Plan:     1  Chronic suspected abruption  - Previous KB negative on last admission  - A positive, last T&S 7/25/21--> T&S repeated today   - Hgb10 4-->10 6, s/p two doses of Venofer on last admission  - NST TID   - Plan for delivery via RLTCS with BS on 8/5/21        2  IUP at 35w5d  - Last growth scan was 7/16/21 with EWF 75%, anterior placenta, no previa  - Repeat TVUS and TAUS 7/27 confirmed anterior placenta and no previa with JIHAN 13 5  - S/p BTM 7/13 - 7/14 at [facility]       3  DVT ppx: SCDs     4  FEN: Regular diet         Subjective:   Patient is not having any current vaginal bleeding  Denies feeling any abdominal pain, contractions this morning, or leakage of fluid this morning  Excellent fetal movement  Objective:     Vitals:   Temp:  [97 8 °F (36 6 °C)-99 1 °F (37 3 °C)] 98 5 °F (36 9 °C)  HR:  [] 87  Resp:  [18] 18  BP: ()/(63-77) 111/69       Physical Exam  Constitutional:       General: She is not in acute distress  Appearance: She is not ill-appearing or toxic-appearing  Comments: Lying in bed comfortably    HENT:      Head: Normocephalic and atraumatic  Pulmonary:      Effort: Pulmonary effort is normal  No respiratory distress  Neurological:      General: No focal deficit present  Mental Status: She is alert and oriented to person, place, and time  Mental status is at baseline  Psychiatric:         Mood and Affect: Mood normal          Thought Content:  Thought content normal          Judgment: Judgment normal          Fetal Assessment:  FHT: 145 BPM, reactive with moderate variability and no decelerations    [place]: Occasional       Lab Results   Component Value Date    WBC 8 86 07/26/2021    HGB 10 6 (L) 07/26/2021    HCT 33 3 (L) 07/26/2021    MCV 87 07/26/2021     07/26/2021       Lab Results   Component Value Date    CALCIUM 8 9 07/15/2021    K 3 8 07/15/2021    CO2 17 (L) 07/15/2021     07/15/2021    BUN 7 07/15/2021    CREATININE 0 71 07/15/2021       Lab Results   Component Value Date    ALT 16 07/15/2021    AST 15 07/15/2021    ALKPHOS 132 (H) 07/15/2021       Alexi Carpio MD  OBGYN, PGY-3  7/29/2021  7:30 AM

## 2021-07-30 PROCEDURE — NC001 PR NO CHARGE: Performed by: OBSTETRICS & GYNECOLOGY

## 2021-07-30 PROCEDURE — 99231 SBSQ HOSP IP/OBS SF/LOW 25: CPT | Performed by: OBSTETRICS & GYNECOLOGY

## 2021-07-30 PROCEDURE — 59025 FETAL NON-STRESS TEST: CPT | Performed by: OBSTETRICS & GYNECOLOGY

## 2021-07-30 RX ADMIN — Medication 1 TABLET: at 09:43

## 2021-07-30 NOTE — PLAN OF CARE
Problem: PAIN - ADULT  Goal: Verbalizes/displays adequate comfort level or baseline comfort level  Description: Interventions:  - Encourage patient to monitor pain and request assistance  - Assess pain using appropriate pain scale  - Administer analgesics based on type and severity of pain and evaluate response  - Implement non-pharmacological measures as appropriate and evaluate response  - Consider cultural and social influences on pain and pain management  - Notify physician/advanced practitioner if interventions unsuccessful or patient reports new pain  Outcome: Progressing     Problem: INFECTION - ADULT  Goal: Absence or prevention of progression during hospitalization  Description: INTERVENTIONS:  - Assess and monitor for signs and symptoms of infection  - Monitor lab/diagnostic results  - Monitor all insertion sites, i e  indwelling lines, tubes, and drains  - Monitor endotracheal if appropriate and nasal secretions for changes in amount and color  - [place] appropriate cooling/warming therapies per order  - Administer medications as ordered  - Instruct and encourage patient and family to use good hand hygiene technique  - Identify and instruct in appropriate isolation precautions for identified infection/condition  Outcome: Progressing  Goal: Absence of fever/infection during neutropenic period  Description: INTERVENTIONS:  - Monitor WBC    Outcome: Progressing     Problem: SAFETY ADULT  Goal: Patient will remain free of falls  Description: INTERVENTIONS:  - Educate patient/family on patient safety including physical limitations  - Instruct patient to call for assistance with activity   - Consult OT/PT to assist with strengthening/mobility   - Keep Call bell within reach  - Keep bed low and locked with side rails adjusted as appropriate  - Keep care items and personal belongings within reach  - Initiate and maintain comfort rounds  - Make Fall Risk Sign visible to staff  - Apply yellow socks and bracelet for high fall risk patients  - Consider moving patient to room near nurses station  Outcome: Progressing  Goal: Maintain or return to baseline ADL function  Description: INTERVENTIONS:  -  Assess patient's ability to carry out ADLs; assess patient's baseline for ADL function and identify physical deficits which impact ability to perform ADLs (bathing, care of mouth/teeth, toileting, grooming, dressing, etc )  - Assess/evaluate cause of self-care deficits   - Assess range of motion  - Assess patient's mobility; develop plan if impaired  - Assess patient's need for assistive devices and provide as appropriate  - Encourage maximum independence but intervene and supervise when necessary  - Involve family in performance of ADLs  - Assess for home care needs following discharge   - Consider OT consult to assist with ADL evaluation and planning for discharge  - Provide patient education as appropriate  Outcome: Progressing  Goal: Maintains/Returns to pre admission functional level  Description: INTERVENTIONS:  - Perform BMAT or MOVE assessment daily    - Set and communicate daily mobility goal to care team and patient/family/caregiver  - Collaborate with rehabilitation services on mobility goals if consulted  - Out of bed for toileting  - Record patient progress and toleration of activity level   Outcome: Progressing     Problem: Knowledge Deficit  Goal: Patient/family/caregiver demonstrates understanding of disease process, treatment plan, medications, and discharge instructions  Description: Complete learning assessment and assess knowledge base    Interventions:  - Provide teaching at level of understanding  - Provide teaching via preferred learning methods  Outcome: Progressing     Problem: DISCHARGE PLANNING  Goal: Discharge to home or other facility with appropriate resources  Description: INTERVENTIONS:  - Identify barriers to discharge w/patient and caregiver  - Arrange for needed discharge resources and transportation as appropriate  - Identify discharge learning needs (meds, wound care, etc )  - Arrange for interpretive services to assist at discharge as needed  - Refer to Case Management Department for coordinating discharge planning if the patient needs post-hospital services based on physician/advanced practitioner order or complex needs related to functional status, cognitive ability, or social support system  Outcome: Progressing

## 2021-07-30 NOTE — PROGRESS NOTES
Progress Note - Maternal Fetal Medicine   Rosalie [place] 30 y o  female MRN: [id number]  Unit/Bed#: -01 Encounter: [id number]    Assessment:  30 y o  Jaime Hand at 35w6d with suspected chronic placental abruption, admitted for the third time with vaginal bleeding, will stay until delivery       Plan:     1  Chronic suspected abruption  - Previous KB negative on last admission  - A positive, last T&S 7/25/21--> T&S repeated today   - Hgb10 4-->10 6, s/p two doses of Venofer on last admission  - NST TID   - Plan for delivery via RLTCS with BS on 8/5/21        2  IUP at 35w6d  - Last growth scan was 7/16/21 with EWF 75%, anterior placenta, no previa  - Repeat TVUS and TAUS 7/27 confirmed anterior placenta and no previa with JIHAN 13 5  - S/p BTM 7/13 - 7/14 at [place]       3  DVT ppx: SCDs     4  FEN: Regular diet         Subjective:   Patient is not having any current vaginal bleeding  Denies feeling any abdominal pain, contractions this morning, or leakage of fluid this morning  Excellent fetal movement  Objective:     Vitals:   Temp:  [98 3 °F (36 8 °C)-98 8 °F (37 1 °C)] 98 5 °F (36 9 °C)  HR:  [] 110  Resp:  [18-20] 18  BP: (105-117)/(56-73) 106/66       Physical Exam  Constitutional:       General: She is not in acute distress  Appearance: She is not ill-appearing or toxic-appearing  Comments: Lying in bed comfortably    HENT:      Head: Normocephalic and atraumatic  Pulmonary:      Effort: Pulmonary effort is normal  No respiratory distress  Neurological:      General: No focal deficit present  Mental Status: She is alert and oriented to person, place, and time  Mental status is at baseline  Psychiatric:         Mood and Affect: Mood normal          Thought Content:  Thought content normal          Judgment: Judgment normal          Fetal Assessment:  FHT: 150 BPM, reactive with moderate variability and no decelerations    [place]: None overnight       Lab Results Component Value Date    WBC 8 86 07/26/2021    HGB 10 6 (L) 07/26/2021    HCT 33 3 (L) 07/26/2021    MCV 87 07/26/2021     07/26/2021       Lab Results   Component Value Date    CALCIUM 8 9 07/15/2021    K 3 8 07/15/2021    CO2 17 (L) 07/15/2021     07/15/2021    BUN 7 07/15/2021    CREATININE 0 71 07/15/2021       Lab Results   Component Value Date    ALT 16 07/15/2021    AST 15 07/15/2021    ALKPHOS 132 (H) 07/15/2021       Hever Narayan MD  OBGYN, PGY-3  7/30/2021  6:50 AM

## 2021-07-30 NOTE — PROGRESS NOTES
Hospital day 7 for this antepartum patient  Admitted with presumed ongoing  chronic abruption in light of bleeding with no other obvious source  No  bleeding overnight  Cat 1 tracing with occ ctx        PMH:  Past Medical History:   Diagnosis Date    Varicella     vaccine       PSH:  Past Surgical History:   Procedure Laterality Date     SECTION           Meds:  No current facility-administered medications on file prior to encounter       Current Outpatient Medications on File Prior to Encounter   Medication Sig Dispense Refill    Ascorbic Acid (vitamin C) 1000 MG tablet Take 1 tablet (1,000 mg total) by mouth 2 (two) times a day 60 tablet 0    cholecalciferol (VITAMIN D3) 1,000 units tablet Take 2 tablets (2,000 Units total) by mouth daily 60 tablet 0    Multiple Vitamin (multivitamin) tablet Take 1 tablet by mouth daily 30 tablet 0       Allergies:  No Known Allergies    Physical Exam:  /56   Pulse 85   Temp 98 4 °F (36 9 °C) (Oral)   Resp 20   Ht 5' (1 524 m)   Wt 78 9 kg (174 lb)   LMP 2020 (Exact Date)   SpO2 98%   BMI 33 98 kg/m²     Abdomen soft nontender, appropriate for gestational age no contractions fht cat 1  Heart RRR  Lungs clear  Ext without edema, bernard's or familia' signs,   Neuro  a/a/o x 3            Assessment: /Plan    35 w 6 d IUP with chronic abruption, third admission  Will be admitted until delivery, planned Repeat c/s with BS on   Growth scan AGA, normal fluid  T&S today  S/p BMZ,

## 2021-07-31 PROCEDURE — 99231 SBSQ HOSP IP/OBS SF/LOW 25: CPT | Performed by: OBSTETRICS & GYNECOLOGY

## 2021-07-31 PROCEDURE — NC001 PR NO CHARGE: Performed by: OBSTETRICS & GYNECOLOGY

## 2021-07-31 PROCEDURE — 59025 FETAL NON-STRESS TEST: CPT | Performed by: OBSTETRICS & GYNECOLOGY

## 2021-07-31 RX ADMIN — Medication 1 TABLET: at 09:38

## 2021-07-31 NOTE — PROGRESS NOTES
[facility] Progress Note   Nnamdi Major 30 y o  female MRN: [id number]  Unit/Bed#: -01 Encounter: [id number]    Assessment:  30 y o  Jerry Oliver at 36w0d admitted with vaginal bleeding in the setting of chronic abruption  Plan:  #1  Chronic suspected abruption  - Previous KB negative on last admission  - A positive, last T&S 7/29/21--> T&S every 3 days  - Hgb10 4-->10 6, s/p two doses of Venofer on last admission  - NST TID   - Plan for delivery via RLTCS with BS on 8/5/21         #2  IUP at 36w0d  - Last growth scan was 7/16/21 with EWF 75%, anterior placenta, no previa  - Repeat TVUS and TAUS 7/27 confirmed anterior placenta and no previa with JIHAN 13 5  - S/p BTM 7/13 - 7/14   - NST TID     FEN: Regular diet   DVT ppx: SCDs       Subjective:   Doing well this AM  Denies vaginal bleeding  Denies LOF or contractions  Feeling good fetal movement  Objective:     Vitals:   Temp:  [98 1 °F (36 7 °C)-99 2 °F (37 3 °C)] 98 4 °F (36 9 °C)  HR:  [] 91  Resp:  [18-20] 18  BP: (101-120)/(56-86) 106/75       Physical Exam  Constitutional:       General: She is not in acute distress  Appearance: She is normal weight  She is not ill-appearing  Cardiovascular:      Rate and Rhythm: Normal rate  Pulmonary:      Effort: Pulmonary effort is normal  No respiratory distress  Skin:     General: Skin is warm and dry  Neurological:      Mental Status: She is alert and oriented to person, place, and time  Psychiatric:         Mood and Affect: Mood normal          Behavior: Behavior normal            Fetal Assessment:  FHT: 150 / Moderate 6 - 25 bpm / no decels, reactive  [place]: quiet    Lab, Imaging and other studies: I have personally reviewed pertinent reports  No results found for this or any previous visit (from the past 12 hour(s))        Colleen Shaffer MD  OB/GYN PGY-4  7/31/2021  4:54 AM

## 2021-08-01 PROBLEM — O34.219 PREGNANCY WITH HISTORY OF CESAREAN SECTION, ANTEPARTUM: Status: ACTIVE | Noted: 2021-05-18

## 2021-08-01 PROCEDURE — 59025 FETAL NON-STRESS TEST: CPT | Performed by: OBSTETRICS & GYNECOLOGY

## 2021-08-01 PROCEDURE — 99231 SBSQ HOSP IP/OBS SF/LOW 25: CPT | Performed by: OBSTETRICS & GYNECOLOGY

## 2021-08-01 PROCEDURE — NC001 PR NO CHARGE: Performed by: OBSTETRICS & GYNECOLOGY

## 2021-08-01 RX ADMIN — Medication 1 TABLET: at 10:25

## 2021-08-01 NOTE — PROGRESS NOTES
Progress Note - Maternal Fetal Medicine   Lisco Pepe Johnson 30 y o  female MRN: [id number]  Unit/Bed#: -01 Encounter: [id number]    Assessment:  30 y o  Mer Dejesus at 36w1d admitted with chronic placental abruption  Plan:  Plan:  #1  Chronic suspected abruption  - Previous KB negative on last admission  - A positive, last T&S 7/29/21--> T&S every 3 days  - Hgb10 4-->10 6, s/p two doses of Venofer on last admission  - NST TID   - Plan for delivery via RLTCS with BS on 8/5/21         #2  IUP at 36w0d  - Last growth scan was 7/16/21 with EWF 75%, anterior placenta, no previa  - Repeat TVUS and TAUS 7/27 confirmed anterior placenta and no previa with JIHAN 13 5  - S/p BTM 7/13 - 7/14   - NST TID     FEN: Regular diet   DVT ppx: SCDs    Subjective/Objective   Doing well  Denies bleeding or pain, endorses good fetal movement  Subjective:     Contractions: no  Loss of fluid: no  Vaginal bleeding: no, not since admission  Fetal movement: yes    Pain: no  Tolerating PO: yes  Voiding: yes  Ambulating: yes  Headaches: no  Visual changes: no  Chest pain: no  Shortness of breath: no  Nausea: no  Vomiting/Diarrhea: no  Dysuria: no  Leg pain: no    Objective:     Vitals:   Temp:  [98 2 °F (36 8 °C)-99 2 °F (37 3 °C)] 99 °F (37 2 °C)  HR:  [] 98  Resp:  [18-20] 18  BP: (108-117)/(58-70) 117/64       Physical Exam  Constitutional:       General: She is not in acute distress  Appearance: She is well-developed  She is not diaphoretic  HENT:      Head: Normocephalic and atraumatic  Eyes:      Pupils: Pupils are equal, round, and reactive to light  Neck:      Trachea: No tracheal deviation  Cardiovascular:      Rate and Rhythm: Normal rate and regular rhythm  Pulses: Normal pulses  Heart sounds: Normal heart sounds  Pulmonary:      Effort: Pulmonary effort is normal  No respiratory distress  Breath sounds: Normal breath sounds  No stridor     Abdominal:      General: There is no distension  Palpations: Abdomen is soft  There is no mass  Tenderness: There is no abdominal tenderness  There is no guarding or rebound  Musculoskeletal:         General: No tenderness or deformity  Normal range of motion  Cervical back: Normal range of motion  Skin:     General: Skin is warm and dry  Coloration: Skin is not pale  Findings: No erythema or rash  Neurological:      Mental Status: She is alert and oriented to person, place, and time  Coordination: Coordination normal    Psychiatric:         Mood and Affect: Mood normal          Behavior: Behavior normal            Fetal Assessment:  FHT: 155 / Moderate 6 - 25 bpm / 15x15 accelerations, no decelerations, reactive  [place]: none    Lab, Imaging and other studies: I have personally reviewed pertinent reports        Lab Results   Component Value Date    WBC 8 86 07/26/2021    HGB 10 6 (L) 07/26/2021    HCT 33 3 (L) 07/26/2021    MCV 87 07/26/2021     07/26/2021       Lab Results   Component Value Date    CALCIUM 8 9 07/15/2021    K 3 8 07/15/2021    CO2 17 (L) 07/15/2021     07/15/2021    BUN 7 07/15/2021    CREATININE 0 71 07/15/2021       Lab Results   Component Value Date    ALT 16 07/15/2021    AST 15 07/15/2021    ALKPHOS 132 (H) 07/15/2021       Katie Frank MD  OBGYN, PGY-4  8/1/2021  4:55 AM

## 2021-08-01 NOTE — PROGRESS NOTES
Progress Note - OB HOSPITALIST ON CALL  Lorna Laura 30 y o  female MRN: [id number]  Unit/Bed#: -01 Encounter: [id number]      Radha Hawk has no current complaints  Denies contraction; has  no LOF, and no VB  Good FM  /64 (BP Location: Left arm)   Pulse 93   Temp 98 9 °F (37 2 °C) (Oral)   Resp 18   Ht 5' (1 524 m)   Wt 78 9 kg (174 lb)   LMP 2020 (Exact Date)   SpO2 98%   BMI 33 98 kg/m²     Physical Exam:   General: AAOx3  No acute distress  Heart: Regular rate & rhythm  Lungs: Clear bilaterally  Normal effort  Abdominal: Soft, non-tender gravid uterus  Extremities: No TTP  Lower limbs symmetric bilaterally  FHT:  Baseline Rate: 150 bpm  Variability: Moderate 6-25 bpm  Accelerations: 15 x 15 or greater  Decelerations: None  FHR Category: Category I  TOCO:   Contraction Frequency (minutes): 0  Contraction Duration (seconds): 70-80  Contraction Quality: Mild    Lab Results   Component Value Date    WBC 8 86 2021    HGB 10 6 (L) 2021    HCT 33 3 (L) 2021     2021     Lab Results   Component Value Date    K 3 8 07/15/2021     07/15/2021    CO2 17 (L) 07/15/2021    BUN 7 07/15/2021    CREATININE 0 71 07/15/2021    AST 15 07/15/2021    ALT 16 07/15/2021       A/P: Radha Hawk is a 30 y o   at 36w0d admitted with chronic abruption  Currently in stable condition  Hospital Day: 8  Inpatient management until delivery - scheduled for 2021    Bell SARABIA   [facility] MD  OB/GYN Hospitalist  [phone]  2021   11:25 PM

## 2021-08-01 NOTE — PROGRESS NOTES
[id number] [address] Attending:      I met with the patient in person  Mirela Epperson and physical as documented  I reviewed the care of the patient  I reviewed the MFM recommendations  I supervised the evaluation and management documented, including all key and critical components      S/Feels well, no complaints  No UC, no VB/LoF   (+)FM  O/98 6F, /61, HR 87  Alert comfortable, NAD  Fundus NT     Lab/    A+/- ()    Hgb 10 4 -> 10 6 ()     A/P  33yo  at 36 1 weeks gestational age HD#9 as admission with chronic abruption  (1) Chronic abruption   Anterior placenta, no previa   History of prior  section   Presented 21 with recurrent bleeding episode and was admitted with plan for inpatient management until delivery   Hgb stable  --> Continue inpatient observation until delivery  --> CBC, T&S q3days    --> For delivery at 36 5 (21)      (2) Prematurity   36 1 weeks   Got betamethasone  and    --> Rescue BMZ not indicated      (3) History of prior  section   Prior LTCS for arrest of second stage   Has been counseled on options and wants RCS   --> For RCS for delivery      (4) Fetal status reassuring   --> Continue NST laxmi Beltran MD

## 2021-08-02 PROCEDURE — 99233 SBSQ HOSP IP/OBS HIGH 50: CPT | Performed by: OBSTETRICS & GYNECOLOGY

## 2021-08-02 PROCEDURE — 86900 BLOOD TYPING SEROLOGIC ABO: CPT | Performed by: OBSTETRICS & GYNECOLOGY

## 2021-08-02 PROCEDURE — 86850 RBC ANTIBODY SCREEN: CPT | Performed by: OBSTETRICS & GYNECOLOGY

## 2021-08-02 PROCEDURE — 59025 FETAL NON-STRESS TEST: CPT | Performed by: OBSTETRICS & GYNECOLOGY

## 2021-08-02 PROCEDURE — 86923 COMPATIBILITY TEST ELECTRIC: CPT

## 2021-08-02 PROCEDURE — NC001 PR NO CHARGE: Performed by: OBSTETRICS & GYNECOLOGY

## 2021-08-02 PROCEDURE — 86901 BLOOD TYPING SEROLOGIC RH(D): CPT | Performed by: OBSTETRICS & GYNECOLOGY

## 2021-08-02 RX ADMIN — Medication 1 TABLET: at 09:57

## 2021-08-02 NOTE — PROGRESS NOTES
[facility] Progress Note   Ricky De Anda 30 y o  female MRN: [id number]  Unit/Bed#: -01 Encounter: [id number]    Assessment:  30 y o  Lencho Mercer at 36w2d admitted with vaginal bleeding in the setting of chronic abruption  Plan:  #1  Chronic suspected abruption  - Previous KB negative on last admission  - A positive, last T&S 7/29/21--> T&S every 3 days  - Hgb10 4-->10 6, s/p two doses of Venofer on last admission  - NST TID   - Plan for delivery via RLTCS with BS on 8/5/21         #2  IUP at 36w2d  - Last growth scan was 7/16/21 with EWF 75%, anterior placenta, no previa  - Repeat TVUS and TAUS 7/27 confirmed anterior placenta and no previa with JIHAN 13 5  - S/p BTM 7/13 - 7/14   - NST TID     FEN: Regular diet   DVT ppx: SCDs       Subjective:   Doing well this AM  Denies vaginal bleeding  Denies LOF or contractions  Feeling good fetal movement  Objective:     Vitals:   Temp:  [98 °F (36 7 °C)-99 °F (37 2 °C)] 98 9 °F (37 2 °C)  HR:  [] 94  Resp:  [18-20] 18  BP: (101-109)/(58-78) 103/58       Physical Exam  Constitutional:       General: She is not in acute distress  Appearance: She is normal weight  She is not ill-appearing  Cardiovascular:      Rate and Rhythm: Normal rate  Pulmonary:      Effort: Pulmonary effort is normal  No respiratory distress  Skin:     General: Skin is warm and dry  Neurological:      Mental Status: She is alert and oriented to person, place, and time  Psychiatric:         Mood and Affect: Mood normal          Behavior: Behavior normal          Fetal Assessment:  FHT: 150 / Moderate 6 - 25 bpm / no decels, reactive  [place]: quiet    Lab, Imaging and other studies: I have personally reviewed pertinent reports        Recent Results (from the past 12 hour(s))   Type and screen    Collection Time: 08/02/21  6:07 AM   Result Value Ref Range    ABO Grouping A     Rh Factor Positive     Antibody Screen Negative     Specimen Expiration Date [id number] Nandini Todd MD  OB/GYN PGY-4  8/2/2021  8:30 AM

## 2021-08-02 NOTE — PLAN OF CARE
Problem: PAIN - ADULT  Goal: Verbalizes/displays adequate comfort level or baseline comfort level  Description: Interventions:  - Encourage patient to monitor pain and request assistance  - Assess pain using appropriate pain scale  - Administer analgesics based on type and severity of pain and evaluate response  - Implement non-pharmacological measures as appropriate and evaluate response  - Consider cultural and social influences on pain and pain management  - Notify physician/advanced practitioner if interventions unsuccessful or patient reports new pain  Outcome: Progressing     Problem: INFECTION - ADULT  Goal: Absence or prevention of progression during hospitalization  Description: INTERVENTIONS:  - Assess and monitor for signs and symptoms of infection  - Monitor lab/diagnostic results  - Monitor all insertion sites, i e  indwelling lines, tubes, and drains  - Monitor endotracheal if appropriate and nasal secretions for changes in amount and color  - [place] appropriate cooling/warming therapies per order  - Administer medications as ordered  - Instruct and encourage patient and family to use good hand hygiene technique  - Identify and instruct in appropriate isolation precautions for identified infection/condition  Outcome: Progressing  Goal: Absence of fever/infection during neutropenic period  Description: INTERVENTIONS:  - Monitor WBC    Outcome: Progressing     Problem: SAFETY ADULT  Goal: Patient will remain free of falls  Description: INTERVENTIONS:  - Educate patient/family on patient safety including physical limitations  - Instruct patient to call for assistance with activity   - Consult OT/PT to assist with strengthening/mobility   - Keep Call bell within reach  - Keep bed low and locked with side rails adjusted as appropriate  - Keep care items and personal belongings within reach  - Initiate and maintain comfort rounds  - Make Fall Risk Sign visible to staff  - Apply yellow socks and bracelet for high fall risk patients  - Consider moving patient to room near nurses station  Outcome: Progressing  Goal: Maintain or return to baseline ADL function  Description: INTERVENTIONS:  -  Assess patient's ability to carry out ADLs; assess patient's baseline for ADL function and identify physical deficits which impact ability to perform ADLs (bathing, care of mouth/teeth, toileting, grooming, dressing, etc )  - Assess/evaluate cause of self-care deficits   - Assess range of motion  - Assess patient's mobility; develop plan if impaired  - Assess patient's need for assistive devices and provide as appropriate  - Encourage maximum independence but intervene and supervise when necessary  - Involve family in performance of ADLs  - Assess for home care needs following discharge   - Consider OT consult to assist with ADL evaluation and planning for discharge  - Provide patient education as appropriate  Outcome: Progressing  Goal: Maintains/Returns to pre admission functional level  Description: INTERVENTIONS:  - Perform BMAT or MOVE assessment daily    - Set and communicate daily mobility goal to care team and patient/family/caregiver  - Collaborate with rehabilitation services on mobility goals if consult  - Out of bed for toileting  - Record patient progress and toleration of activity level   Outcome: Progressing     Problem: Knowledge Deficit  Goal: Patient/family/caregiver demonstrates understanding of disease process, treatment plan, medications, and discharge instructions  Description: Complete learning assessment and assess knowledge base    Interventions:  - Provide teaching at level of understanding  - Provide teaching via preferred learning methods  Outcome: Progressing     Problem: DISCHARGE PLANNING  Goal: Discharge to home or other facility with appropriate resources  Description: INTERVENTIONS:  - Identify barriers to discharge w/patient and caregiver  - Arrange for needed discharge resources and transportation as appropriate  - Identify discharge learning needs (meds, wound care, etc )  - Arrange for interpretive services to assist at discharge as needed  - Refer to Case Management Department for coordinating discharge planning if the patient needs post-hospital services based on physician/advanced practitioner order or complex needs related to functional status, cognitive ability, or social support system  Outcome: Progressing

## 2021-08-03 LAB — FERRITIN SERPL-MCNC: 141 NG/ML (ref 8–388)

## 2021-08-03 PROCEDURE — 99231 SBSQ HOSP IP/OBS SF/LOW 25: CPT | Performed by: OBSTETRICS & GYNECOLOGY

## 2021-08-03 PROCEDURE — 82728 ASSAY OF FERRITIN: CPT | Performed by: OBSTETRICS & GYNECOLOGY

## 2021-08-03 RX ADMIN — Medication 1 TABLET: at 10:11

## 2021-08-03 NOTE — PLAN OF CARE
Problem: PAIN - ADULT  Goal: Verbalizes/displays adequate comfort level or baseline comfort level  Description: Interventions:  - Encourage patient to monitor pain and request assistance  - Assess pain using appropriate pain scale  - Administer analgesics based on type and severity of pain and evaluate response  - Implement non-pharmacological measures as appropriate and evaluate response  - Consider cultural and social influences on pain and pain management  - Notify physician/advanced practitioner if interventions unsuccessful or patient reports new pain  Outcome: Progressing     Problem: INFECTION - ADULT  Goal: Absence or prevention of progression during hospitalization  Description: INTERVENTIONS:  - Assess and monitor for signs and symptoms of infection  - Monitor lab/diagnostic results  - Monitor all insertion sites, i e  indwelling lines, tubes, and drains  - Monitor endotracheal if appropriate and nasal secretions for changes in amount and color  - [place] appropriate cooling/warming therapies per order  - Administer medications as ordered  - Instruct and encourage patient and family to use good hand hygiene technique  - Identify and instruct in appropriate isolation precautions for identified infection/condition  Outcome: Progressing  Goal: Absence of fever/infection during neutropenic period  Description: INTERVENTIONS:  - Monitor WBC    Outcome: Progressing     Problem: SAFETY ADULT  Goal: Patient will remain free of falls  Description: INTERVENTIONS:  - Educate patient/family on patient safety including physical limitations  - Instruct patient to call for assistance with activity   - Consult OT/PT to assist with strengthening/mobility   - Keep Call bell within reach  - Keep bed low and locked with side rails adjusted as appropriate  - Keep care items and personal belongings within reach  - Initiate and maintain comfort rounds  - Make Fall Risk Sign visible to staff  - Apply yellow socks and bracelet for high fall risk patients  - Consider moving patient to room near nurses station  Outcome: Progressing  Goal: Maintain or return to baseline ADL function  Description: INTERVENTIONS:  -  Assess patient's ability to carry out ADLs; assess patient's baseline for ADL function and identify physical deficits which impact ability to perform ADLs (bathing, care of mouth/teeth, toileting, grooming, dressing, etc )  - Assess/evaluate cause of self-care deficits   - Assess range of motion  - Assess patient's mobility; develop plan if impaired  - Assess patient's need for assistive devices and provide as appropriate  - Encourage maximum independence but intervene and supervise when necessary  - Involve family in performance of ADLs  - Assess for home care needs following discharge   - Consider OT consult to assist with ADL evaluation and planning for discharge  - Provide patient education as appropriate  Outcome: Progressing  Goal: Maintains/Returns to pre admission functional level  Description: INTERVENTIONS:  - Perform BMAT or MOVE assessment daily    - Set and communicate daily mobility goal to care team and patient/family/caregiver  - Collaborate with rehabilitation services on mobility goals if consulted  - Out of bed for toileting  - Record patient progress and toleration of activity level   Outcome: Progressing     Problem: Knowledge Deficit  Goal: Patient/family/caregiver demonstrates understanding of disease process, treatment plan, medications, and discharge instructions  Description: Complete learning assessment and assess knowledge base    Interventions:  - Provide teaching at level of understanding  - Provide teaching via preferred learning methods  Outcome: Progressing     Problem: DISCHARGE PLANNING  Goal: Discharge to home or other facility with appropriate resources  Description: INTERVENTIONS:  - Identify barriers to discharge w/patient and caregiver  - Arrange for needed discharge resources and transportation as appropriate  - Identify discharge learning needs (meds, wound care, etc )  - Arrange for interpretive services to assist at discharge as needed  - Refer to Case Management Department for coordinating discharge planning if the patient needs post-hospital services based on physician/advanced practitioner order or complex needs related to functional status, cognitive ability, or social support system  Outcome: Progressing

## 2021-08-03 NOTE — PROGRESS NOTES
MFM Progress Note   General Meiers 30 y o  female MRN: [id number]  Unit/Bed#: -01 Encounter: [id number]    Assessment:  30 y o   at 36w3d admitted with vaginal bleeding in the setting of chronic abruption  Plan:  #1  Chronic suspected abruption:  - Previous KB negative on last admission  - A positive, last T&S 8/3/21--> T&S every 3 days  - Hgb10 4-->10 6, s/p two doses of Venofer on last admission  - NST daily 6am  - Plan for delivery via RLTCS with BS on 21         #2  IUP at 36w3d  - Last growth scan was 21 with EWF 75%, anterior placenta, no previa  - Repeat TVUS and TAUS  confirmed anterior placenta and no previa with JIHAN 13 5  - S/p BTM  -       FEN: Regular diet   DVT ppx: SCDs       Subjective:   Doing well this AM    Denies vaginal bleeding  Denies LOF or contractions  Feeling good fetal movement  Objective:     Vitals:   Temp:  [98 °F (36 7 °C)-99 2 °F (37 3 °C)] 98 1 °F (36 7 °C)  HR:  [] 101  Resp:  [18] 18  BP: (101-113)/(66-77) 109/66       Physical Exam  Constitutional:       General: She is not in acute distress  Appearance: She is normal weight  She is not ill-appearing  Cardiovascular:      Rate and Rhythm: Normal rate  Pulmonary:      Effort: Pulmonary effort is normal  No respiratory distress  Skin:     General: Skin is warm and dry  Neurological:      Mental Status: She is alert and oriented to person, place, and time  Psychiatric:         Mood and Affect: Mood normal          Behavior: Behavior normal          Fetal Assessment:  FHT: 150 / Moderate 6 - 25 bpm / no decels, reactive  [place]: quiet    Lab, Imaging and other studies: I have personally reviewed pertinent reports  No results found for this or any previous visit (from the past 12 hour(s))        Audra Randall MD  OB/GYN PGY-4  8/3/2021  6:57 AM

## 2021-08-04 ENCOUNTER — IMMUNIZATIONS (INPATIENT)
Dept: FAMILY MEDICINE CLINIC | Facility: HOSPITAL | Age: 36
DRG: 539 | End: 2021-08-04
Payer: COMMERCIAL

## 2021-08-04 DIAGNOSIS — Z23 ENCOUNTER FOR IMMUNIZATION: Primary | ICD-10-CM

## 2021-08-04 PROCEDURE — 0001A SARS-COV-2 / COVID-19 MRNA VACCINE (PFIZER-BIONTECH) 30 MCG: CPT

## 2021-08-04 PROCEDURE — 91300 SARS-COV-2 / COVID-19 MRNA VACCINE (PFIZER-BIONTECH) 30 MCG: CPT

## 2021-08-04 PROCEDURE — 59025 FETAL NON-STRESS TEST: CPT | Performed by: OBSTETRICS & GYNECOLOGY

## 2021-08-04 PROCEDURE — 99231 SBSQ HOSP IP/OBS SF/LOW 25: CPT | Performed by: OBSTETRICS & GYNECOLOGY

## 2021-08-04 RX ADMIN — Medication 1 TABLET: at 08:21

## 2021-08-04 NOTE — ASSESSMENT & PLAN NOTE
I have been trying to arrange her covid vaccine that she requested through Tino Gilman from pharmacy  Patient is aware she may get her vaccine at some time during her stay and then she can get her second shot by walking in to the [facility] to the [facility] access hospital on Tuesdays 3-6 pm and Saturday 7-10 am  She can also sign up for appt by using her phone and looking up covid vaccine [address]  This info will need to be placed in her discharge instructions

## 2021-08-04 NOTE — PLAN OF CARE
Problem: PAIN - ADULT  Goal: Verbalizes/displays adequate comfort level or baseline comfort level  Description: Interventions:  - Encourage patient to monitor pain and request assistance  - Assess pain using appropriate pain scale  - Administer analgesics based on type and severity of pain and evaluate response  - Implement non-pharmacological measures as appropriate and evaluate response  - Consider cultural and social influences on pain and pain management  - Notify physician/advanced practitioner if interventions unsuccessful or patient reports new pain  Outcome: Progressing     Problem: INFECTION - ADULT  Goal: Absence or prevention of progression during hospitalization  Description: INTERVENTIONS:  - Assess and monitor for signs and symptoms of infection  - Monitor lab/diagnostic results  - Monitor all insertion sites, i e  indwelling lines, tubes, and drains  - Monitor endotracheal if appropriate and nasal secretions for changes in amount and color  - [place] appropriate cooling/warming therapies per order  - Administer medications as ordered  - Instruct and encourage patient and family to use good hand hygiene technique  - Identify and instruct in appropriate isolation precautions for identified infection/condition  Outcome: Progressing  Goal: Absence of fever/infection during neutropenic period  Description: INTERVENTIONS:  - Monitor WBC    Outcome: Progressing     Problem: SAFETY ADULT  Goal: Patient will remain free of falls  Description: INTERVENTIONS:  - Educate patient/family on patient safety including physical limitations  - Instruct patient to call for assistance with activity   - Consult OT/PT to assist with strengthening/mobility   - Keep Call bell within reach  - Keep bed low and locked with side rails adjusted as appropriate  - Keep care items and personal belongings within reach  - Initiate and maintain comfort rounds  - Make Fall Risk Sign visible to staff  - Offer Toileting every ** Hours, in advance of need  - Initiate/Maintain **alarm  - Obtain necessary fall risk management equipment: **  - Apply yellow socks and bracelet for high fall risk patients  - Consider moving patient to room near nurses station  Outcome: Progressing  Goal: Maintain or return to baseline ADL function  Description: INTERVENTIONS:  -  Assess patient's ability to carry out ADLs; assess patient's baseline for ADL function and identify physical deficits which impact ability to perform ADLs (bathing, care of mouth/teeth, toileting, grooming, dressing, etc )  - Assess/evaluate cause of self-care deficits   - Assess range of motion  - Assess patient's mobility; develop plan if impaired  - Assess patient's need for assistive devices and provide as appropriate  - Encourage maximum independence but intervene and supervise when necessary  - Involve family in performance of ADLs  - Assess for home care needs following discharge   - Consider OT consult to assist with ADL evaluation and planning for discharge  - Provide patient education as appropriate  Outcome: Progressing  Goal: Maintains/Returns to pre admission functional level  Description: INTERVENTIONS:  - Perform BMAT or MOVE assessment daily    - Set and communicate daily mobility goal to care team and patient/family/caregiver  - Collaborate with rehabilitation services on mobility goals if consulted  - Perform Range of Motion ** times a day  - Reposition patient every ** hours    - Dangle patient ** times a day  - Stand patient ** times a day  - Ambulate patient ** times a day  - Out of bed to chair ** times a day   - Out of bed for meals ** times a day  - Out of bed for toileting  - Record patient progress and toleration of activity level   Outcome: Progressing     Problem: Knowledge Deficit  Goal: Patient/family/caregiver demonstrates understanding of disease process, treatment plan, medications, and discharge instructions  Description: Complete learning assessment and assess knowledge base    Interventions:  - Provide teaching at level of understanding  - Provide teaching via preferred learning methods  Outcome: Progressing     Problem: DISCHARGE PLANNING  Goal: Discharge to home or other facility with appropriate resources  Description: INTERVENTIONS:  - Identify barriers to discharge w/patient and caregiver  - Arrange for needed discharge resources and transportation as appropriate  - Identify discharge learning needs (meds, wound care, etc )  - Arrange for interpretive services to assist at discharge as needed  - Refer to Case Management Department for coordinating discharge planning if the patient needs post-hospital services based on physician/advanced practitioner order or complex needs related to functional status, cognitive ability, or social support system  Outcome: Progressing

## 2021-08-04 NOTE — PROGRESS NOTES
MFM Progress Note   Jersey Enriquez 30 y o  female MRN: [id number]  Unit/Bed#: -01 Encounter: [id number]    Assessment:  30 y o   at 36w4d admitted with vaginal bleeding in the setting of chronic abruption  Plan:  #1  Chronic suspected abruption:  - Previous KB negative on last admission  - A positive, last T&S 8/3/21--> T&S every 3 days  - Hgb10 4-->10 6, s/p two doses of Venofer on last admission  - NST daily 6am  - Plan for delivery via RLTCS with BS on 21         #2  IUP at 36w4d:  - Last growth scan was 21 with EWF 75%, anterior placenta, no previa  - Repeat TVUS and TAUS  confirmed anterior placenta and no previa with JIHAN 13 5  - S/p BTM  -       FEN: Regular diet   DVT ppx: SCDs       Subjective:   No issues this AM       Objective:     Vitals:   Temp:  [98 1 °F (36 7 °C)-98 5 °F (36 9 °C)] 98 5 °F (36 9 °C)  HR:  [] 104  Resp:  [18] 18  BP: (109-125)/(60-78) 109/64       Physical Exam  Constitutional:       General: She is not in acute distress  Appearance: She is normal weight  She is not ill-appearing  Cardiovascular:      Rate and Rhythm: Normal rate  Pulmonary:      Effort: Pulmonary effort is normal  No respiratory distress  Neurological:      Mental Status: She is oriented to person, place, and time  Psychiatric:         Mood and Affect: Mood normal          Behavior: Behavior normal          Fetal Assessment:  FHT: 150 / Moderate 6 - 25 bpm / no decels, reactive  [place]: quiet    Lab, Imaging and other studies: I have personally reviewed pertinent reports  No results found for this or any previous visit (from the past 12 hour(s))        Janet Leigh MD  OB/GYN PGY-4  2021  6:15 AM

## 2021-08-05 ENCOUNTER — ANESTHESIA (INPATIENT)
Dept: LABOR AND DELIVERY | Facility: HOSPITAL | Age: 36
DRG: 539 | End: 2021-08-05
Payer: COMMERCIAL

## 2021-08-05 PROBLEM — Z98.891 S/P REPEAT LOW TRANSVERSE C-SECTION: Status: ACTIVE | Noted: 2021-08-05

## 2021-08-05 PROBLEM — Z90.79 STATUS POST BILATERAL SALPINGECTOMY: Status: ACTIVE | Noted: 2021-08-05

## 2021-08-05 PROBLEM — O45.90 PLACENTAL ABRUPTION: Status: ACTIVE | Noted: 2021-08-05

## 2021-08-05 LAB
ABO GROUP BLD: NORMAL
BASE EXCESS BLDCOA CALC-SCNC: -4 MMOL/L (ref 3–11)
BASE EXCESS BLDCOV CALC-SCNC: -3.6 MMOL/L (ref 1–9)
BLD GP AB SCN SERPL QL: NEGATIVE
ERYTHROCYTE [DISTWIDTH] IN BLOOD BY AUTOMATED COUNT: 18.7 % (ref 11.6–15.1)
FIBRINOGEN PPP-MCNC: 489 MG/DL (ref 227–495)
HCO3 BLDCOA-SCNC: 24.4 MMOL/L (ref 17.3–27.3)
HCO3 BLDCOV-SCNC: 22.9 MMOL/L (ref 12.2–28.6)
HCT VFR BLD AUTO: 36 % (ref 34.8–46.1)
HGB BLD-MCNC: 11.4 G/DL (ref 11.5–15.4)
MCH RBC QN AUTO: 28.1 PG (ref 26.8–34.3)
MCHC RBC AUTO-ENTMCNC: 31.7 G/DL (ref 31.4–37.4)
MCV RBC AUTO: 89 FL (ref 82–98)
O2 CT VFR BLDCOA CALC: 3.3 ML/DL
OXYHGB MFR BLDCOA: 19 %
OXYHGB MFR BLDCOV: 36 %
PCO2 BLDCOA: 59.8 MM[HG] (ref 30–60)
PCO2 BLDCOV: 47 MM HG (ref 27–43)
PH BLDCOA: 7.23 [PH] (ref 7.23–7.43)
PH BLDCOV: 7.3 [PH] (ref 7.19–7.49)
PLATELET # BLD AUTO: 230 THOUSANDS/UL (ref 149–390)
PMV BLD AUTO: 10.8 FL (ref 8.9–12.7)
PO2 BLDCOA: 13.2 MM HG (ref 5–25)
PO2 BLDCOV: 17.9 MM HG (ref 15–45)
RBC # BLD AUTO: 4.06 MILLION/UL (ref 3.81–5.12)
RH BLD: POSITIVE
SAO2 % BLDCOV: 6.6 ML/DL
SPECIMEN EXPIRATION DATE: NORMAL
WBC # BLD AUTO: 9.06 THOUSAND/UL (ref 4.31–10.16)

## 2021-08-05 PROCEDURE — 86901 BLOOD TYPING SEROLOGIC RH(D): CPT | Performed by: OBSTETRICS & GYNECOLOGY

## 2021-08-05 PROCEDURE — 88302 TISSUE EXAM BY PATHOLOGIST: CPT | Performed by: PATHOLOGY

## 2021-08-05 PROCEDURE — 0UB70ZZ EXCISION OF BILATERAL FALLOPIAN TUBES, OPEN APPROACH: ICD-10-PCS | Performed by: OBSTETRICS & GYNECOLOGY

## 2021-08-05 PROCEDURE — 86900 BLOOD TYPING SEROLOGIC ABO: CPT | Performed by: OBSTETRICS & GYNECOLOGY

## 2021-08-05 PROCEDURE — 85384 FIBRINOGEN ACTIVITY: CPT | Performed by: OBSTETRICS & GYNECOLOGY

## 2021-08-05 PROCEDURE — NC001 PR NO CHARGE: Performed by: OBSTETRICS & GYNECOLOGY

## 2021-08-05 PROCEDURE — 59514 CESAREAN DELIVERY ONLY: CPT | Performed by: OBSTETRICS & GYNECOLOGY

## 2021-08-05 PROCEDURE — 82805 BLOOD GASES W/O2 SATURATION: CPT | Performed by: OBSTETRICS & GYNECOLOGY

## 2021-08-05 PROCEDURE — 86850 RBC ANTIBODY SCREEN: CPT | Performed by: OBSTETRICS & GYNECOLOGY

## 2021-08-05 PROCEDURE — 85027 COMPLETE CBC AUTOMATED: CPT | Performed by: OBSTETRICS & GYNECOLOGY

## 2021-08-05 PROCEDURE — 88307 TISSUE EXAM BY PATHOLOGIST: CPT | Performed by: PATHOLOGY

## 2021-08-05 PROCEDURE — 86923 COMPATIBILITY TEST ELECTRIC: CPT

## 2021-08-05 PROCEDURE — 58611 LIGATE OVIDUCT(S) ADD-ON: CPT | Performed by: OBSTETRICS & GYNECOLOGY

## 2021-08-05 RX ORDER — TRANEXAMIC ACID 100 MG/ML
INJECTION, SOLUTION INTRAVENOUS AS NEEDED
Status: DISCONTINUED | OUTPATIENT
Start: 2021-08-05 | End: 2021-08-05

## 2021-08-05 RX ORDER — DEXAMETHASONE SODIUM PHOSPHATE 4 MG/ML
8 INJECTION, SOLUTION INTRA-ARTICULAR; INTRALESIONAL; INTRAMUSCULAR; INTRAVENOUS; SOFT TISSUE ONCE AS NEEDED
Status: DISCONTINUED | OUTPATIENT
Start: 2021-08-05 | End: 2021-08-05 | Stop reason: SDUPTHER

## 2021-08-05 RX ORDER — FENTANYL CITRATE/PF 50 MCG/ML
50 SYRINGE (ML) INJECTION
Status: DISCONTINUED | OUTPATIENT
Start: 2021-08-05 | End: 2021-08-08 | Stop reason: HOSPADM

## 2021-08-05 RX ORDER — OXYTOCIN/RINGER'S LACTATE 30/500 ML
62.5 PLASTIC BAG, INJECTION (ML) INTRAVENOUS CONTINUOUS
Status: ACTIVE | OUTPATIENT
Start: 2021-08-05 | End: 2021-08-05

## 2021-08-05 RX ORDER — MIDAZOLAM HYDROCHLORIDE 2 MG/2ML
INJECTION, SOLUTION INTRAMUSCULAR; INTRAVENOUS AS NEEDED
Status: DISCONTINUED | OUTPATIENT
Start: 2021-08-05 | End: 2021-08-05

## 2021-08-05 RX ORDER — HYDROMORPHONE HCL/PF 1 MG/ML
0.5 SYRINGE (ML) INJECTION EVERY 2 HOUR PRN
Status: DISCONTINUED | OUTPATIENT
Start: 2021-08-05 | End: 2021-08-05 | Stop reason: SDUPTHER

## 2021-08-05 RX ORDER — HYDROMORPHONE HCL/PF 1 MG/ML
0.5 SYRINGE (ML) INJECTION EVERY 2 HOUR PRN
Status: ACTIVE | OUTPATIENT
Start: 2021-08-05 | End: 2021-08-06

## 2021-08-05 RX ORDER — DOCUSATE SODIUM 100 MG/1
100 CAPSULE, LIQUID FILLED ORAL 2 TIMES DAILY
Status: DISCONTINUED | OUTPATIENT
Start: 2021-08-05 | End: 2021-08-08 | Stop reason: HOSPADM

## 2021-08-05 RX ORDER — CALCIUM CARBONATE 200(500)MG
1000 TABLET,CHEWABLE ORAL DAILY PRN
Status: DISCONTINUED | OUTPATIENT
Start: 2021-08-05 | End: 2021-08-08 | Stop reason: HOSPADM

## 2021-08-05 RX ORDER — NALBUPHINE HCL 10 MG/ML
10 AMPUL (ML) INJECTION
Status: DISCONTINUED | OUTPATIENT
Start: 2021-08-05 | End: 2021-08-05

## 2021-08-05 RX ORDER — KETOROLAC TROMETHAMINE 30 MG/ML
INJECTION, SOLUTION INTRAMUSCULAR; INTRAVENOUS AS NEEDED
Status: DISCONTINUED | OUTPATIENT
Start: 2021-08-05 | End: 2021-08-05

## 2021-08-05 RX ORDER — ACETAMINOPHEN 325 MG/1
650 TABLET ORAL EVERY 6 HOURS
Status: DISPENSED | OUTPATIENT
Start: 2021-08-05 | End: 2021-08-06

## 2021-08-05 RX ORDER — ONDANSETRON 2 MG/ML
4 INJECTION INTRAMUSCULAR; INTRAVENOUS ONCE AS NEEDED
Status: DISCONTINUED | OUTPATIENT
Start: 2021-08-05 | End: 2021-08-08 | Stop reason: HOSPADM

## 2021-08-05 RX ORDER — DIPHENHYDRAMINE HYDROCHLORIDE 50 MG/ML
25 INJECTION INTRAMUSCULAR; INTRAVENOUS EVERY 6 HOURS PRN
Status: ACTIVE | OUTPATIENT
Start: 2021-08-05 | End: 2021-08-06

## 2021-08-05 RX ORDER — KETOROLAC TROMETHAMINE 30 MG/ML
30 INJECTION, SOLUTION INTRAMUSCULAR; INTRAVENOUS EVERY 6 HOURS PRN
Status: DISCONTINUED | OUTPATIENT
Start: 2021-08-05 | End: 2021-08-05

## 2021-08-05 RX ORDER — ONDANSETRON 2 MG/ML
INJECTION INTRAMUSCULAR; INTRAVENOUS AS NEEDED
Status: DISCONTINUED | OUTPATIENT
Start: 2021-08-05 | End: 2021-08-05

## 2021-08-05 RX ORDER — IBUPROFEN 600 MG/1
600 TABLET ORAL EVERY 6 HOURS PRN
Status: DISCONTINUED | OUTPATIENT
Start: 2021-08-05 | End: 2021-08-08 | Stop reason: HOSPADM

## 2021-08-05 RX ORDER — SODIUM CHLORIDE, SODIUM LACTATE, POTASSIUM CHLORIDE, CALCIUM CHLORIDE 600; 310; 30; 20 MG/100ML; MG/100ML; MG/100ML; MG/100ML
125 INJECTION, SOLUTION INTRAVENOUS CONTINUOUS
Status: DISCONTINUED | OUTPATIENT
Start: 2021-08-05 | End: 2021-08-07

## 2021-08-05 RX ORDER — NALBUPHINE HCL 10 MG/ML
5 AMPUL (ML) INJECTION
Status: DISCONTINUED | OUTPATIENT
Start: 2021-08-05 | End: 2021-08-08 | Stop reason: HOSPADM

## 2021-08-05 RX ORDER — NALOXONE HYDROCHLORIDE 0.4 MG/ML
0.1 INJECTION, SOLUTION INTRAMUSCULAR; INTRAVENOUS; SUBCUTANEOUS
Status: DISCONTINUED | OUTPATIENT
Start: 2021-08-05 | End: 2021-08-05 | Stop reason: SDUPTHER

## 2021-08-05 RX ORDER — NALOXONE HYDROCHLORIDE 0.4 MG/ML
0.1 INJECTION, SOLUTION INTRAMUSCULAR; INTRAVENOUS; SUBCUTANEOUS
Status: ACTIVE | OUTPATIENT
Start: 2021-08-05 | End: 2021-08-06

## 2021-08-05 RX ORDER — CEFAZOLIN SODIUM 2 G/50ML
2000 SOLUTION INTRAVENOUS ONCE
Status: COMPLETED | OUTPATIENT
Start: 2021-08-05 | End: 2021-08-05

## 2021-08-05 RX ORDER — ACETAMINOPHEN 325 MG/1
650 TABLET ORAL EVERY 6 HOURS PRN
Status: DISCONTINUED | OUTPATIENT
Start: 2021-08-06 | End: 2021-08-08 | Stop reason: HOSPADM

## 2021-08-05 RX ORDER — DEXAMETHASONE SODIUM PHOSPHATE 4 MG/ML
8 INJECTION, SOLUTION INTRA-ARTICULAR; INTRALESIONAL; INTRAMUSCULAR; INTRAVENOUS; SOFT TISSUE ONCE AS NEEDED
Status: ACTIVE | OUTPATIENT
Start: 2021-08-05 | End: 2021-08-06

## 2021-08-05 RX ORDER — DIPHENHYDRAMINE HYDROCHLORIDE 50 MG/ML
25 INJECTION INTRAMUSCULAR; INTRAVENOUS EVERY 6 HOURS PRN
Status: DISCONTINUED | OUTPATIENT
Start: 2021-08-05 | End: 2021-08-05 | Stop reason: SDUPTHER

## 2021-08-05 RX ORDER — OXYCODONE HYDROCHLORIDE 5 MG/1
5 TABLET ORAL EVERY 4 HOURS PRN
Status: DISCONTINUED | OUTPATIENT
Start: 2021-08-06 | End: 2021-08-08 | Stop reason: HOSPADM

## 2021-08-05 RX ORDER — SIMETHICONE 80 MG
80 TABLET,CHEWABLE ORAL 4 TIMES DAILY PRN
Status: DISCONTINUED | OUTPATIENT
Start: 2021-08-05 | End: 2021-08-08 | Stop reason: HOSPADM

## 2021-08-05 RX ORDER — CEFAZOLIN SODIUM 2 G/50ML
SOLUTION INTRAVENOUS AS NEEDED
Status: DISCONTINUED | OUTPATIENT
Start: 2021-08-05 | End: 2021-08-05

## 2021-08-05 RX ORDER — ONDANSETRON 2 MG/ML
4 INJECTION INTRAMUSCULAR; INTRAVENOUS EVERY 4 HOURS PRN
Status: DISCONTINUED | OUTPATIENT
Start: 2021-08-05 | End: 2021-08-05 | Stop reason: SDUPTHER

## 2021-08-05 RX ORDER — ACETAMINOPHEN 325 MG/1
650 TABLET ORAL EVERY 6 HOURS
Status: DISCONTINUED | OUTPATIENT
Start: 2021-08-05 | End: 2021-08-05 | Stop reason: SDUPTHER

## 2021-08-05 RX ORDER — FENTANYL CITRATE 50 UG/ML
INJECTION, SOLUTION INTRAMUSCULAR; INTRAVENOUS AS NEEDED
Status: DISCONTINUED | OUTPATIENT
Start: 2021-08-05 | End: 2021-08-05

## 2021-08-05 RX ORDER — KETOROLAC TROMETHAMINE 30 MG/ML
15 INJECTION, SOLUTION INTRAMUSCULAR; INTRAVENOUS EVERY 6 HOURS
Status: COMPLETED | OUTPATIENT
Start: 2021-08-05 | End: 2021-08-06

## 2021-08-05 RX ORDER — METOCLOPRAMIDE HYDROCHLORIDE 5 MG/ML
5 INJECTION INTRAMUSCULAR; INTRAVENOUS EVERY 6 HOURS PRN
Status: ACTIVE | OUTPATIENT
Start: 2021-08-05 | End: 2021-08-06

## 2021-08-05 RX ORDER — BUPIVACAINE HYDROCHLORIDE 7.5 MG/ML
INJECTION, SOLUTION INTRASPINAL AS NEEDED
Status: DISCONTINUED | OUTPATIENT
Start: 2021-08-05 | End: 2021-08-05

## 2021-08-05 RX ORDER — OXYTOCIN/RINGER'S LACTATE 30/500 ML
PLASTIC BAG, INJECTION (ML) INTRAVENOUS CONTINUOUS PRN
Status: DISCONTINUED | OUTPATIENT
Start: 2021-08-05 | End: 2021-08-05

## 2021-08-05 RX ORDER — SODIUM CHLORIDE, SODIUM LACTATE, POTASSIUM CHLORIDE, CALCIUM CHLORIDE 600; 310; 30; 20 MG/100ML; MG/100ML; MG/100ML; MG/100ML
20 INJECTION, SOLUTION INTRAVENOUS CONTINUOUS
Status: DISCONTINUED | OUTPATIENT
Start: 2021-08-05 | End: 2021-08-07

## 2021-08-05 RX ORDER — ONDANSETRON 2 MG/ML
4 INJECTION INTRAMUSCULAR; INTRAVENOUS EVERY 4 HOURS PRN
Status: ACTIVE | OUTPATIENT
Start: 2021-08-05 | End: 2021-08-06

## 2021-08-05 RX ORDER — DIPHENHYDRAMINE HCL 25 MG
25 TABLET ORAL EVERY 6 HOURS PRN
Status: DISCONTINUED | OUTPATIENT
Start: 2021-08-05 | End: 2021-08-08 | Stop reason: HOSPADM

## 2021-08-05 RX ORDER — MORPHINE SULFATE 0.5 MG/ML
INJECTION, SOLUTION EPIDURAL; INTRATHECAL; INTRAVENOUS AS NEEDED
Status: DISCONTINUED | OUTPATIENT
Start: 2021-08-05 | End: 2021-08-05

## 2021-08-05 RX ORDER — OXYCODONE HYDROCHLORIDE 10 MG/1
10 TABLET ORAL EVERY 4 HOURS PRN
Status: DISCONTINUED | OUTPATIENT
Start: 2021-08-06 | End: 2021-08-08 | Stop reason: HOSPADM

## 2021-08-05 RX ORDER — DIAPER,BRIEF,INFANT-TODD,DISP
1 EACH MISCELLANEOUS DAILY PRN
Status: DISCONTINUED | OUTPATIENT
Start: 2021-08-05 | End: 2021-08-08 | Stop reason: HOSPADM

## 2021-08-05 RX ORDER — SODIUM CHLORIDE, SODIUM LACTATE, POTASSIUM CHLORIDE, CALCIUM CHLORIDE 600; 310; 30; 20 MG/100ML; MG/100ML; MG/100ML; MG/100ML
INJECTION, SOLUTION INTRAVENOUS CONTINUOUS PRN
Status: DISCONTINUED | OUTPATIENT
Start: 2021-08-05 | End: 2021-08-05

## 2021-08-05 RX ADMIN — FENTANYL CITRATE 25 MCG: 50 INJECTION, SOLUTION INTRAMUSCULAR; INTRAVENOUS at 09:30

## 2021-08-05 RX ADMIN — BUPIVACAINE HYDROCHLORIDE IN DEXTROSE 1.7 ML: 7.5 INJECTION, SOLUTION SUBARACHNOID at 08:58

## 2021-08-05 RX ADMIN — SODIUM CHLORIDE, SODIUM LACTATE, POTASSIUM CHLORIDE, AND CALCIUM CHLORIDE: .6; .31; .03; .02 INJECTION, SOLUTION INTRAVENOUS at 09:17

## 2021-08-05 RX ADMIN — SODIUM CHLORIDE, SODIUM LACTATE, POTASSIUM CHLORIDE, AND CALCIUM CHLORIDE 125 ML/HR: .6; .31; .03; .02 INJECTION, SOLUTION INTRAVENOUS at 22:40

## 2021-08-05 RX ADMIN — ONDANSETRON 4 MG: 2 INJECTION INTRAMUSCULAR; INTRAVENOUS at 08:54

## 2021-08-05 RX ADMIN — Medication 250 MILLI-UNITS/MIN: at 09:24

## 2021-08-05 RX ADMIN — MORPHINE SULFATE 1.5 MG: 0.5 INJECTION, SOLUTION EPIDURAL; INTRATHECAL; INTRAVENOUS at 09:30

## 2021-08-05 RX ADMIN — SODIUM CHLORIDE 1000 ML: 0.9 INJECTION, SOLUTION INTRAVENOUS at 08:12

## 2021-08-05 RX ADMIN — KETOROLAC TROMETHAMINE 15 MG: 30 INJECTION, SOLUTION INTRAMUSCULAR; INTRAVENOUS at 16:18

## 2021-08-05 RX ADMIN — NALBUPHINE HYDROCHLORIDE 10 MG: 10 INJECTION, SOLUTION INTRAMUSCULAR; INTRAVENOUS; SUBCUTANEOUS at 11:16

## 2021-08-05 RX ADMIN — PHENYLEPHRINE HYDROCHLORIDE 50 MCG/MIN: 10 INJECTION INTRAVENOUS at 09:00

## 2021-08-05 RX ADMIN — CEFAZOLIN SODIUM 2000 MG: 2 SOLUTION INTRAVENOUS at 09:00

## 2021-08-05 RX ADMIN — SODIUM CHLORIDE, SODIUM LACTATE, POTASSIUM CHLORIDE, AND CALCIUM CHLORIDE: .6; .31; .03; .02 INJECTION, SOLUTION INTRAVENOUS at 10:36

## 2021-08-05 RX ADMIN — TRANEXAMIC ACID 1000 MG: 1 INJECTION, SOLUTION INTRAVENOUS at 09:30

## 2021-08-05 RX ADMIN — FENTANYL CITRATE 10 MCG: 50 INJECTION, SOLUTION INTRAMUSCULAR; INTRAVENOUS at 08:58

## 2021-08-05 RX ADMIN — Medication 62.5 MILLI-UNITS/MIN: at 11:36

## 2021-08-05 RX ADMIN — KETOROLAC TROMETHAMINE 15 MG: 30 INJECTION, SOLUTION INTRAMUSCULAR; INTRAVENOUS at 22:44

## 2021-08-05 RX ADMIN — MIDAZOLAM HYDROCHLORIDE 2 MG: 1 INJECTION, SOLUTION INTRAMUSCULAR; INTRAVENOUS at 10:03

## 2021-08-05 RX ADMIN — KETOROLAC TROMETHAMINE 30 MG: 30 INJECTION, SOLUTION INTRAMUSCULAR at 10:25

## 2021-08-05 RX ADMIN — CEFAZOLIN SODIUM 2000 MG: 2 SOLUTION INTRAVENOUS at 09:10

## 2021-08-05 RX ADMIN — ACETAMINOPHEN 650 MG: 325 TABLET, FILM COATED ORAL at 11:16

## 2021-08-05 RX ADMIN — ACETAMINOPHEN 650 MG: 325 TABLET, FILM COATED ORAL at 22:43

## 2021-08-05 RX ADMIN — MORPHINE SULFATE 0.2 MG: 0.5 INJECTION, SOLUTION EPIDURAL; INTRATHECAL; INTRAVENOUS at 08:58

## 2021-08-05 NOTE — PROGRESS NOTES
Late entry secondary to patient care:     Called by nursing to assess patient due to vaginal bleeding  She passed a clot while in the shower and a subsequent smaller clot  She denies contractions or pain  She reports good fetal movement  She does not have any bleeding on her pad at this time  Will do continuous monitoring overnight (currently reactive) as patient is scheduled for  section tomorrow  Will continue to monitor and if bleeding persists, will consider delivery overnight  [place] Laila Archuleta MD  OB/GYN  2021  11:47 PM

## 2021-08-05 NOTE — ANESTHESIA PREPROCEDURE EVALUATION
Procedure:   SECTION () REPEAT (N/A Uterus)  LIGATION/COAGULATION TUBAL (Bilateral Abdomen)    Relevant Problems   GYN   (+) 36 weeks gestation of pregnancy   (+) Pregnancy with history of  section, antepartum      Other   (+) COVID-19 affecting pregnancy in second trimester   (+) Obesity (BMI 30 0-34 9)   (+) Placental abruption (chronic  )        Physical Exam    Airway    Mallampati score: III  TM Distance: >3 FB  Neck ROM: full     Dental   No notable dental hx     Cardiovascular      Pulmonary      Other Findings        Anesthesia Plan  ASA Score- 3     Anesthesia Type- spinal with ASA Monitors  Additional Monitors:   Airway Plan:           Plan Factors-    Chart reviewed  Existing labs reviewed  Patient summary reviewed  Induction-     Postoperative Plan- Plan for postoperative opioid use  Informed Consent- Anesthetic plan and risks discussed with patient  I personally reviewed this patient with the CRNA  Discussed and agreed on the Anesthesia Plan with the CRNA  Kyree Dixon

## 2021-08-05 NOTE — DISCHARGE SUMMARY
CS Discharge Summary - Maykel Sloan 30 y o  female MRN: [id number]    Unit/Bed#: LD PACU-01 Encounter: [id number]    Admission Date: 2021     Discharge Date: 21    Admitting Diagnosis:   1) Chronic placental abruption  2) Pregnancy at 35w0d  3) History of prior  section  4) GBS bacteriuria  5) Hx of COVID  6) Desire for permanent sterilization    Discharge Diagnosis:   Same, delivered    Procedures:   repeat  section, low transverse incision and bilateral salpingectomy    Admitting Attending: Dr Orlinda Kehr  Delivery Attending: Dr Timmy Ortega  Discharge Attending: Dr Darryl Monae service: Maternal Fetal Medicine  Consult attending: Wilmar Recinos MD    Hospital Course:     Maykel Sloan is a 30 y o  [id number] who was admitted at 35w0d with vaginal bleeding in the setting of chronic placental abruption  She remained inpatient on the antepartum service for fetal monitoring and monitoring of vaginal bleeding  Per maternal-fetal-medicine recommendations, Patient was indicated for delivery at 36w5d for chronic placental abruption  On day of scheduled  delivery, FHT was Category II for fetal tachycardia and patient was noted to pass a large clot  Decision was made to proceed to the OR urgently  She then underwent an uncomplicated  section delivery and bilateral salpingectomy and delivered a viable male  at 56  APGARS were 9, 9 at 1 and 5 minutes, respectively   weighed 7lb 2oz  Placenta was delivered at [phone] and had evidence of abruption   was then transferred to  nursery  Patient tolerated the procedure well and was transferred to recovery in stable condition  The patient's post partum course was remarkable for Intrapratum intramniotic infection requiring 24 hr of unasyn    Preoperative hemaglobin was 11 4, postoperative was 8 5  Her postoperative pain was well controlled with oral analgesics      On day of discharge, she was ambulating and able to reasonably perform all ADLs  She was voiding and had appropriate bowel function  Pain was well controlled  She was discharged home on post-operative day #2 without complications  Patient was instructed to follow up with her OB as an outpatient and was given appropriate warnings to call provider if she develops signs of infection or uncontrolled pain  She is breast feeding   Mom's blood type is A positive  Rhogam was not given  Complications:   None    Condition at discharge:   good     Provisions for Follow-Up Care:  See after visit summary for information related to follow-up care and any pertinent home health orders  Disposition:   Home    Planned Readmission:   No    Discharge Medications:   Prenatal vitamin daily for 6 months or the duration of nursing whichever is longer  Motrin 600 mg orally every 6 hours as needed for pain  Tylenol (over the counter) per bottle directions as needed for pain  Hydrocortisone cream 1% (over the counter) applied 1-2x daily to hemorrhoids as needed  Witch hazel pads for hemorrhoidal discomfort as needed      Discharge instructions :   -Do not place anything (no partner, tampons or douche) in your vagina for 6 weeks  -You may walk for exercise for the first 6 weeks then gradually return to your usual activities    -Please do not drive for 1 week if you have no stitches and for 2 weeks if you have stitches or underwent a  delivery     -You may take baths or shower per your preference    -Please look at your bust (breasts) in the mirror daily and call provider for redness or tenderness or increased warmth  - If you have had a  please look at your incision daily as well and call provider for increasing redness or steady drainage from the incision    -Please call your provider if temperature > 100 4*F or 38* C, worsening pain or a foul discharge

## 2021-08-05 NOTE — ANESTHESIA POSTPROCEDURE EVALUATION
Post-Op Assessment Note    CV Status:  Stable  Pain Score: 0    Pain management: adequate     Mental Status:  Alert and awake   Hydration Status:  Euvolemic   PONV Controlled:  Controlled   Airway Patency:  Patent      Post Op Vitals Reviewed: Yes      Staff: CRNA         No complications documented      /87 (08/05/21 1045)    Temp 98 5 °F (36 9 °C) (08/05/21 1045)    Pulse (!) 106 (08/05/21 1045)   Resp 20 (08/05/21 1045)    SpO2 98 % (08/05/21 1045)

## 2021-08-05 NOTE — OP NOTE
OPERATIVE REPORT  PATIENT NAME: Mundo Williamson    :  1985  MRN: [id number]  Pt Location: [facility] L&D OR ROOM 02    SURGERY DATE: 2021    Indications:   Placental abruption  Category II FHT  Maternal request for repeat  section  Desire for permanent sterilization    Pre-operative Diagnosis:   36w5d pregnancy  Chronic placental abruption  History of prior  section  GBS bacteriuria  Hx of COVID  Desire for permanent sterilization    Post-operative Diagnosis:   RLTCS and bilateral salpingectomy  Placental abruption  Suspicion for intrauterine intraamniotic infeciton    Attending: Mai Goodell, MD and Greg Clayton MD  Resident: Salazar Rosenberg MD and Susanna Barber MD    Maternal Findings:  Normal uterus  Normal tubes and ovaries bilaterally  Adhesion from anterior uterus to left pelvic side wall  No difficulty noted from skin to delivery     Findings:  Viable male weighing 7lbs 2oz;  Apgar scores of 9 at one minute and 9 at five minutes  Bloody, malodorous amniotic fluid  Anterior placenta with passage of large clots at time of hysterotomy consistent with placental abruption    Arterial and Venous Gases:  Umbilical Cord Venous Blood Gas:  Results from last 7 days   Lab Units 21  0927   PH COV  7 305   PCO2 COV mm HG 47 0*   HCO3 COV mmol/L 22 9   BASE EXC COV mmol/L -3 6*   O2 CT CD VB mL/dL 6 6   O2 HGB, VENOUS CORD % 24 8     Umbilical Cord Arterial Blood Gas:  Results from last 7 days   Lab Units 21  0927   PH COA  7 228*   PCO2 COA  59 8   PO2 COA mm HG 13 2   HCO3 COA mmol/L 24 4   BASE EXC COA mmol/L -4 0*   O2 CONTENT CORD ART ml/dl 3 3   O2 HGB, ARTERIAL CORD % 19 0       Specimens: Arterial and venous cord gases, cord blood, segment of umbilical cord, placenta to pathology    Quantitative Blood Loss: 653 mL    Fluids: 1 L NS    Drains: King catheter           Complications:  None; patient tolerated the procedure well  Disposition: PACU            Condition: stable    Procedure Details   The patient was seen prior to the procedure  FHT were Category II for fetal tachycardia and patient had further passage of clots  Decision was made to proceed with  section  Risks, benefits, possible complications, alternate treatment options, and expected outcomes were discussed with the patient  Patient expressed desire for permanent sterilization  The patient agreed with the proposed plan and gave informed consent for a repeat low transverse  section and bilateral tubal ligation  The patient was taken to the [facility] Operating Room at [phone] where she received spinal anesthesia  For infection prophylaxis, she received 2g ancef IV preoperatively  Fetal heart tones in the OR were assessed and noted to be tachycardic and a King catheter and SCDs were placed  The abdomen was prepped with Chloraprep, the vagina was prepped with diluted Chlorhexidine, and following appropriate drying time, the patient was draped in the usual sterile manner  A Time Out was held and the above information confirmed  The patient was identified as Denise Hernández and the procedure verified as a  Delivery for chronic placental abruption  A Pfannenstiel incision was made and carried down through the underlying subcutaneous tissue to the fascia using a scalpel  The rectus fascia was then nicked in the midline and dissected laterally using Barriga scissors  The superior edge of the  fascial incision was grasped with Kocher clamps bilaterally, tented upward and the underlying rectus muscles were dissected off sharply with the scalpel  This was repeated on the inferior edge of the fascia and dissected down to the pubic rami  The rectus muscles were  and the peritoneum was identified, entered, and extended longitudinally with blunt dissection  The bladder blade was inserted   A low transverse uterine incision was made with the scalpel and extended cephalocaudad with blunt dissection  The amnion was entered bluntly  The placenta was present directly inferior to the hysterotomy and large clots passed at this time confirming placental abruption  The fetal head was palpated, elevated, and delivered through the uterine incision followed by the body without difficulty  Time of birth was noted at 8579  There was noted to be spontaneous cry and good tone  There was no apparent injury to the   Delayed cord clamping was not performed  The infant was handed off to the  providers  Arterial and venous cord gases, cord blood, and a segment of umbilical cord were obtained for evaluation  The placenta delivered spontaneously at 8146 with uterine fundal massage  The placenta was noted to be clotted  The uterus was exteriorized and cleaned out with a moist lap sponge  The uterine incision was closed with a running locked suture of 0 Monocryl  A second layer of the same suture was used to horizontally imbricate the first   Central 1 cm area of the incision was noted to be oozy and a figure of eight suture of 0 Monocryl was used to achieve hemostasis  Serosal bleeding was cauterized with the Bovie  Attention was then turned to perform a bilateral tubal ligation  A time-out was performed and the above information re-confirmed  The right fallopian tube was isolated and followed all the way to the fimbriated ends  This was tented up with a Klarissa clamp around the proximal portion of the tubal ampulla revealing the vascular supply of the mesosalpinx  A window was created in an avascular area of the mesosalpix using Bovie cautery  Two additional windows were created in avascular areas to the level of the cornua  A single strand of 2-0 Plain suture was used to ligate the pedicle  This segment of fallopian tube was excised with Bovie cautery   This technique was repeated x3, working proximally along the fallopian tube, which was then excised 2cm from the cornua  The left fallopian tube was then excised in similar fashion  Both tubes sent to the lab for pathology  Good hemostasis was confirmed following salpingectomy  Normal saline solution was used to irrigate the posterior culdesac  The hysterotomy was reinspected and noted to be hemostatic  The uterus was gently returned to the abdomen  The paracolic gutters were inspected and cleared of all clots and debris  The salpingectomy sites were inspected and noted to be hemostatic as was the uterine incision  The fascia was closed with a running suture of 0 Vicryl  Subcutaneous adipose tissue was closed with a running suture of 2-0 Plain gut  The skin was closed with a subcuticular running suture of 4-0 Monocryl  Exofin was applied  The patient appeared to tolerate the procedure very well  Lap sponge, needle, and instrument counts were correct x2  The patient's fundus was palpated and the uterus was expressed  She was then cleaned and transferred to her postpartum recovery room in stable condition and her infant went to the  nursery  Attending Attestation: Gavin Cortez and [employer]Â® were present for the entire procedure      SIGNATURE: Sabine Wade MD  DATE: 2021  TIME: 10:45 AM

## 2021-08-05 NOTE — H&P
H&P Exam - Obstetrics   Janette Reardon 30 y o  female MRN: [id number]  Unit/Bed#: -01 Encounter: [id number]      History of Present Illness     Chief Complaint: Vaginal bleeding    HPI:  Kandice Baeza is a 30 y o   female with an RUEL of 2021, by Last Menstrual Period at 36w5d weeks gestation who is having a  today for the indication chronic abruption and previous   She requests permanent sterilization  She has been admitted to the antepartum service since   Passed 1 clot last night in the shower       Contractions: no  Loss of fluid: no  Vaginal bleeding: yes  Fetal movement: yes      PREGNANCY COMPLICATIONS:   1) Chronic placental abruption      OB History    Para Term  AB Living   2 1 1     1   SAB TAB Ectopic Multiple Live Births           1      # Outcome Date GA Lbr Kt/2nd Weight Sex Delivery Anes PTL Lv   2 Current            1 Term 14 42w0d  4082 g (9 lb) F CS-LTranv Spinal N АНДРЙЕ      Complications: Failure to Progress in Second Stage       Baby complications/comments: None    Review of Systems    Historical Information   Past Medical History:   Diagnosis Date    Varicella     vaccine     Past Surgical History:   Procedure Laterality Date     SECTION       Social History   Social History     Substance and Sexual Activity   Alcohol Use Never     Social History     Substance and Sexual Activity   Drug Use Never     Social History     Tobacco Use   Smoking Status Never Smoker   Smokeless Tobacco Never Used     Family History: non-contributory    Meds/Allergies    {  Medications Prior to Admission   Medication    [] amoxicillin (AMOXIL) 875 mg tablet    Ascorbic Acid (vitamin C) 1000 MG tablet    cholecalciferol (VITAMIN D3) 1,000 units tablet    Multiple Vitamin (multivitamin) tablet      No Known Allergies      OBJECTIVE:    Vitals:   /59   Pulse (!) 108 Comment: nurse Season notified  Temp 98 2 °F (36 8 °C) (Oral)   Resp 18   Ht 5' (1 524 m)   Wt 78 9 kg (174 lb)   LMP 2020 (Exact Date)   SpO2 99%   BMI 33 98 kg/m²   Body mass index is 33 98 kg/m²  Physical Exam  Constitutional:       General: She is not in acute distress  Appearance: She is not ill-appearing  Cardiovascular:      Rate and Rhythm: Normal rate  Pulmonary:      Effort: Pulmonary effort is normal  No respiratory distress  Abdominal:      Palpations: Abdomen is soft  Tenderness: There is no abdominal tenderness  There is no guarding  Skin:     General: Skin is warm and dry  Neurological:      Mental Status: She is alert and oriented to person, place, and time  Psychiatric:         Mood and Affect: Mood normal          Behavior: Behavior normal            Fetal heart rate:   Baseline Rate: 145 bpm  Variability: Moderate 6-25 bpm  Accelerations: 15 x 15 or greater  Decelerations: None  FHR Category: Category I    [place]:   Contraction Frequency (minutes): irritability & occasional ctx  Contraction Duration (seconds): 30-60  Contraction Quality: Mild    EFW: 6-7lbs    GBS: positive    Prenatal Labs: I have personally reviewed pertinent reports  Invasive Devices     Peripheral Intravenous Line            Peripheral IV 21 Right Antecubital 3 days                  Assessment/Plan     ASSESSMENT:    33yo  at 36w5d weeks gestation who is having a repeat  and Permanent Sterilization in the setting of previous  and chronic abruption  The placenta is anterior, there is no evidence of Previa or accreta  She is at increased risk for bleeding therefor we will type and cross for 2 units PRBC, insert 2 peripheral IV, and obtain pre-Op fibrinogen  PLAN:   1) Repeat    2)  Blood Type and cross and  Fibrinogen   3) Contraception: Permanent sterilization   4) Anesthesia consult for spinal   5) Ancef 2 gm for ppx   6) D/w team members      The patient was counseled regarding indications, risks, benefits and alternatives to surgical management  We discussed risks including but not limited to bleeding and potential need for blood transfusion, infection, pain, injury to surrounding organs such as bladder, intestines, ureters and neurovascular structures  Patient understands potential risks associated stress of surgery and general anesthesia including not limited to acute cardiac events, venous thrombosis embolism, etc   All questions answered to patient satisfaction  Patient agrees and wants to proceed  This patient will be an INPATIENT and I certify the anticipated length of stay is >2 Midnights        Mitzie Essex, MD  8/5/2021  7:03 AM

## 2021-08-05 NOTE — ANESTHESIA PROCEDURE NOTES
Spinal Block    Patient location during procedure: OR  Start time: 8/5/2021 8:58 AM  Reason for block: procedure for pain and at surgeon's request  Staffing  Performed: resident/CRNA   Anesthesiologist: Linus Guerra MD  Resident/CRNA: Monda Bamberger, CRNA  Preanesthetic Checklist  Completed: patient identified, IV checked, risks and benefits discussed, surgical consent, monitors and equipment checked, pre-op evaluation and timeout performed  Spinal Block  Patient position: sitting  Prep: Betadine  Patient monitoring: cardiac monitor, frequent blood pressure checks and continuous pulse ox  Approach: midline  Location: L3-4  Injection technique: single-shot  Needle  Needle type: pencil-tip   Needle gauge: 25 G  Needle length: 10 cm  Assessment  Sensory level: T4  Injection Assessment:  negative aspiration for heme, no paresthesia on injection and positive aspiration for clear CSF    Post-procedure:  adhesive bandage applied, pressure dressing applied, secured with tape, site cleaned and sterile dressing applied

## 2021-08-05 NOTE — DISCHARGE INSTRUCTIONS
Cesariana   O QUE VOCÊ DEVE SABER:   Bina cesárea ou cesariana é bina cirurgia abdominal para fazer o parto  Há muitos motivos para você precisar de bina cesariana  · Bina cesariana pode ser agendada antes do trabalho de parto se você teve bina cesariana para o último bebê  Daria pode ser agendada se o seu bebê não estiver posicionado normalmente ou se você estiver grávida de mais de um bebê  · Seu cuidador pode realizar bina cesariana de emergência risa o parto para prevenir complicações com risco de [place] para você ou seu bebê  Bina cesariana pode ser feita se o colo do útero não dilatar após várias horas de trabalho de [place]  · Outros motivos para bina cesariana incluem infecções maternas e problemas com a placenta  [address]:   Medicamentos:   · Analgésicos de prescrição médica  podem ser Aimee Baldy  Pergunte chas jayce soniya medicamento de forma mendoza  · Acetaminofeno (acetaminophen)  diminui a sabine e febre  Sylvie está disponível vish receita médica  Pergunte sobre a dose e a frequência que deve jayce o medicamento  Siga as instruções  O acetaminofeno pode causar danos ao fígado se não for tomado corretamente  · AINEs  ajudam a diminuir o inchaço, a sabine ou a febre  Soniya medicamento está disponível com ou vish receita médica  Os AINEs podem provocar sangramento no estômago ou problemas renais em algumas pessoas  Se você estiver tomando um anticoagulante, sempre consulte seu obstetra para saber se os AINEs são adequados para você  Sempre toby a bula do medicamento e siga as instruções  · [place] o seu medicamento conforme orientado  Entre em contato com seu obstetra se achar que o medicamento não está ajudando ou se sylvie causar efeitos colaterais  Informe a sylvie se você for alérgico a algum medicamento  Mantenha bina lista de todos os medicamentos, vitaminas e ervas (fitoterápicos) que você kevin  Inclua a quantidade, quando e por que os [place]   Leve a lista ou os frascos de comprimidos às consultas de acompanhamento  Leve sua lista de medicamentos consigo em muna de emergência  Faça o acompanhamento com o obstetra conforme orientado:  Pode ser [facility] para remover os pontos ou grampos  Anote as dúvidas que você tem para lembrar de perguntar sobre elas risa as consultas  Cuidados com o ferimento:  Lave cuidadosamente o ferimento com água e sabão todos os aguayo  mantenha a ferida limpa e seca  Use roupas soltas e confortáveis que não esfreguem contra o ferimento  [place] com seu obstetra sobre o banho na banheira e no chuveiro  [place] bastante líquido:  Você pode reduzir o risco de formação de coágulo se beber bastante líquido  Pergunte quanto de líquido precisa por edwina e quais líquidos são melhores para você  Limite a atividade até você se recuperar totalmente de bina cirurgia:   · Pergunte quando é seguro dirigir, subir escadas, levantar objetos pesados e ter relações sexuais  · Pergunte quando é o momento de fazer exercícios e que tipos de exercício deve fazer  Comece lentamente e aumente conforme for ficando mais forte  Entre em contato com seu obstetra se:   · Você tiver sangramento vaginal pesado, que encha um ou mais absorventes em ROSSÖN  · Você tiver febre  · Sua incisão estiver inchada, vermelha ou com pus  · Você tiver dúvidas ou preocupações sobre você mesma ou o seu bebê  Procure atendimento imediatamente ou ligue para 911 se:   · O curativo estiver encharcado de sangue  · Os pontos abrirem  · Sentir vertigem, falta de ar e sabine no peito  · Você tossir sangue  · Seu braço (ou sua luis enrique) está quente, sensível e dolorido  Daria pode parecer manuel e fariba  © 2014 [address] is for End User's use only and may not be sold, redistributed or otherwise used for commercial purposes   All illustrations and images included in CareNotes® are the copyrighted property of A D A M , Inc  or [employer]  The above information is an  only  It is not intended as medical advice for individual conditions or treatments  Talk to your doctor, nurse or pharmacist before following any medical regimen to see if it is safe and effective for you

## 2021-08-05 NOTE — INTERVAL H&P NOTE
Visit Vitals  BP 90/52 (BP Location: Left arm)   Pulse (!) 123   Temp 99 2 °F (37 3 °C) (Oral)   Resp 16   Ht 5' (1 524 m)   Wt 78 9 kg (174 lb)   LMP 11/21/2020 (Exact Date)   SpO2 97%   BMI 33 98 kg/m²   OB Status Pregnant   Smoking Status Never Smoker   BSA 1 76 m²     Will repeat vitals now  No changes since time of H&P and attestation

## 2021-08-05 NOTE — PLAN OF CARE
Problem: PAIN - ADULT  Goal: Verbalizes/displays adequate comfort level or baseline comfort level  Description: Interventions:  - Encourage patient to monitor pain and request assistance  - Assess pain using appropriate pain scale  - Administer analgesics based on type and severity of pain and evaluate response  - Implement non-pharmacological measures as appropriate and evaluate response  - Consider cultural and social influences on pain and pain management  - Notify physician/advanced practitioner if interventions unsuccessful or patient reports new pain  Outcome: Progressing     Problem: INFECTION - ADULT  Goal: Absence or prevention of progression during hospitalization  Description: INTERVENTIONS:  - Assess and monitor for signs and symptoms of infection  - Monitor lab/diagnostic results  - Monitor all insertion sites, i e  indwelling lines, tubes, and drains  - Monitor endotracheal if appropriate and nasal secretions for changes in amount and color  - [place] appropriate cooling/warming therapies per order  - Administer medications as ordered  - Instruct and encourage patient and family to use good hand hygiene technique  - Identify and instruct in appropriate isolation precautions for identified infection/condition  Outcome: Progressing  Goal: Absence of fever/infection during neutropenic period  Description: INTERVENTIONS:  - Monitor WBC    Outcome: Progressing     Problem: SAFETY ADULT  Goal: Patient will remain free of falls  Description: INTERVENTIONS:  - Educate patient/family on patient safety including physical limitations  - Instruct patient to call for assistance with activity   - Consult OT/PT to assist with strengthening/mobility   - Keep Call bell within reach  - Keep bed low and locked with side rails adjusted as appropriate  - Keep care items and personal belongings within reach  - Initiate and maintain comfort rounds  Outcome: Progressing  Goal: Maintain or return to baseline ADL function  Description: INTERVENTIONS:  -  Assess patient's ability to carry out ADLs; assess patient's baseline for ADL function and identify physical deficits which impact ability to perform ADLs (bathing, care of mouth/teeth, toileting, grooming, dressing, etc )  - Assess/evaluate cause of self-care deficits   - Assess range of motion  - Assess patient's mobility; develop plan if impaired  - Assess patient's need for assistive devices and provide as appropriate  - Encourage maximum independence but intervene and supervise when necessary  - Involve family in performance of ADLs  - Assess for home care needs following discharge   - Consider OT consult to assist with ADL evaluation and planning for discharge  - Provide patient education as appropriate  Outcome: Progressing  Goal: Maintains/Returns to pre admission functional level  Description: INTERVENTIONS:  - Perform BMAT or MOVE assessment daily    - Set and communicate daily mobility goal to care team and patient/family/caregiver  - Out of bed for toileting  - Record patient progress and toleration of activity level   Outcome: Progressing     Problem: Knowledge Deficit  Goal: Patient/family/caregiver demonstrates understanding of disease process, treatment plan, medications, and discharge instructions  Description: Complete learning assessment and assess knowledge base    Interventions:  - Provide teaching at level of understanding  - Provide teaching via preferred learning methods  Outcome: Progressing     Problem: DISCHARGE PLANNING  Goal: Discharge to home or other facility with appropriate resources  Description: INTERVENTIONS:  - Identify barriers to discharge w/patient and caregiver  - Arrange for needed discharge resources and transportation as appropriate  - Identify discharge learning needs (meds, wound care, etc )  - Arrange for interpretive services to assist at discharge as needed  - Refer to Case Management Department for coordinating discharge planning if the patient needs post-hospital services based on physician/advanced practitioner order or complex needs related to functional status, cognitive ability, or social support system  Outcome: Progressing     Problem: POSTPARTUM  Goal: Experiences normal postpartum course  Description: INTERVENTIONS:  - Monitor maternal vital signs  - Assess uterine involution and lochia  Outcome: Progressing  Goal: Appropriate maternal -  bonding  Description: INTERVENTIONS:  - Identify family support  - Assess for appropriate maternal/infant bonding   -Encourage maternal/infant bonding opportunities  - Referral to  or  as needed  Outcome: Progressing  Goal: Establishment of infant feeding pattern  Description: INTERVENTIONS:  - Assess breast/bottle feeding  - Refer to lactation as needed  Outcome: Progressing  Goal: Incision(s), wounds(s) or drain site(s) healing without S/S of infection  Description: INTERVENTIONS  - Assess and document dressing, incision, wound bed, drain sites and surrounding tissue  - Provide patient and family education  Outcome: Progressing

## 2021-08-06 LAB
ABO GROUP BLD BPU: NORMAL
BPU ID: NORMAL
CROSSMATCH: NORMAL
ERYTHROCYTE [DISTWIDTH] IN BLOOD BY AUTOMATED COUNT: 18.8 % (ref 11.6–15.1)
HCT VFR BLD AUTO: 26.7 % (ref 34.8–46.1)
HGB BLD-MCNC: 8.5 G/DL (ref 11.5–15.4)
MCH RBC QN AUTO: 27.9 PG (ref 26.8–34.3)
MCHC RBC AUTO-ENTMCNC: 31.8 G/DL (ref 31.4–37.4)
MCV RBC AUTO: 88 FL (ref 82–98)
PLATELET # BLD AUTO: 166 THOUSANDS/UL (ref 149–390)
PMV BLD AUTO: 10.8 FL (ref 8.9–12.7)
RBC # BLD AUTO: 3.05 MILLION/UL (ref 3.81–5.12)
UNIT DISPENSE STATUS: NORMAL
UNIT PRODUCT CODE: NORMAL
UNIT PRODUCT VOLUME: 350 ML
UNIT RH: NORMAL
WBC # BLD AUTO: 7.93 THOUSAND/UL (ref 4.31–10.16)

## 2021-08-06 PROCEDURE — 85027 COMPLETE CBC AUTOMATED: CPT | Performed by: OBSTETRICS & GYNECOLOGY

## 2021-08-06 PROCEDURE — 99024 POSTOP FOLLOW-UP VISIT: CPT | Performed by: OBSTETRICS & GYNECOLOGY

## 2021-08-06 RX ADMIN — DOCUSATE SODIUM 100 MG: 100 CAPSULE ORAL at 08:25

## 2021-08-06 RX ADMIN — SODIUM CHLORIDE 3 G: 9 INJECTION, SOLUTION INTRAVENOUS at 18:23

## 2021-08-06 RX ADMIN — IBUPROFEN 600 MG: 600 TABLET ORAL at 16:52

## 2021-08-06 RX ADMIN — OXYCODONE HYDROCHLORIDE 10 MG: 10 TABLET ORAL at 16:53

## 2021-08-06 RX ADMIN — SODIUM CHLORIDE 3 G: 9 INJECTION, SOLUTION INTRAVENOUS at 06:00

## 2021-08-06 RX ADMIN — ENOXAPARIN SODIUM 40 MG: 40 INJECTION SUBCUTANEOUS at 08:24

## 2021-08-06 RX ADMIN — Medication 1 TABLET: at 08:25

## 2021-08-06 RX ADMIN — IRON SUCROSE 200 MG: 20 INJECTION, SOLUTION INTRAVENOUS at 08:24

## 2021-08-06 RX ADMIN — ACETAMINOPHEN 650 MG: 325 TABLET, FILM COATED ORAL at 04:46

## 2021-08-06 RX ADMIN — IBUPROFEN 600 MG: 600 TABLET ORAL at 08:24

## 2021-08-06 RX ADMIN — DOCUSATE SODIUM 100 MG: 100 CAPSULE ORAL at 16:52

## 2021-08-06 RX ADMIN — KETOROLAC TROMETHAMINE 15 MG: 30 INJECTION, SOLUTION INTRAMUSCULAR; INTRAVENOUS at 04:46

## 2021-08-06 RX ADMIN — SODIUM CHLORIDE 3 G: 9 INJECTION, SOLUTION INTRAVENOUS at 00:12

## 2021-08-06 RX ADMIN — SODIUM CHLORIDE 3 G: 9 INJECTION, SOLUTION INTRAVENOUS at 12:45

## 2021-08-06 NOTE — LACTATION NOTE
This note was copied from a baby's chart  CONSULT - LACTATION  Baby Boy Tal Reyna) Lucia Barajas 1 days male MRN: [id number]    [facility] Room / Bed: (N)/(N) Encounter: [id number]    Maternal Information     MOTHER:  Lizzy Lou  Maternal Age: 30 y o    OB History: # 1 - Date: 14, Sex: Female, Weight: 4082 g (9 lb), GA: 42w0d, Delivery: , Low Transverse, Apgar1: None, Apgar5: None, Living: Living, Birth Comments: None    # 2 - Date: 21, Sex: Male, Weight: 3225 g (7 lb 1 8 oz), GA: 36w5d, Delivery: , Low Transverse, Apgar1: 9, Apgar5: 9, Living: Living, Birth Comments: None   Previouse breast reduction surgery? No    Lactation history:   Has patient previously breast fed: Yes   How long had patient previously breast fed: 1 year   Previous breast feeding complications:       Past Surgical History:   Procedure Laterality Date     SECTION      OR  DELIVERY ONLY N/A 2021    Procedure:  SECTION () REPEAT;  Surgeon: Akila Carroll MD;  Location: [facility] ;  Service: Obstetrics    OR LIGATION,FALLOPIAN TUBE W/ Bilateral 2021    Procedure: LIGATION/COAGULATION TUBAL;  Surgeon: Akila Carroll MD;  Location: [facility] ;  Service: Obstetrics        Birth information:  YOB: 2020   Time of birth: 9:27 AM   Sex: male   Delivery type: , Low Transverse   Birth Weight: 3225 g (7 lb 1 8 oz)   Percent of Weight Change: -4%     Gestational Age: 44w9d   [unfilled]    Assessment     Breast and nipple assessment: normal assessment    [place] Assessment: normal assessment    Feeding assessment: feeding well  LATCH:  Latch: Grasps breast, tongue down, lips flanged, rhythmic sucking   Audible Swallowing: Spontaneous and intermittent (24 hours old)   Type of Nipple: Everted (After stimulation)   Comfort (Breast/Nipple): Soft/non-tender   Hold (Positioning):  No assist from staff, mother able to position/hold infant   LATCH Score: 10          Feeding recommendations:  breast feed on demand     Met with mother  Provided mother with Ready, Set, Baby booklet discussed via language line,  [id number]  Discussed Skin to Skin contact an benefits to mom and baby  Talked about the delay of the first bath until baby has adjusted  Spoke about the benefits of rooming in  Feeding on cue and what that means for recognizing infant's hunger  Avoidance of pacifiers for the first month discussed  Talked about exclusive breastfeeding for the first 6 months  Positioning and latch reviewed as well as showing images of other feeding positions  Discussed the properties of a good latch in any position  Reviewed hand/manual expression  Discussed s/s that baby is getting enough milk and some s/s that breastfeeding dyad may need further help  Gave information on common concerns, what to expect the first few weeks after delivery, preparing for other caregivers, and how partners can help  Resources for support also provided  Information on hand expression given  Discussed benefits of knowing how to manually express breast including stimulating milk supply, softening nipple for latch and evacuating breast in the event of engorgement  Discussed 2nd night syndrome and ways to calm infant  Hand out given  Baby latched at this time, demonstrated to Mom how to obtain a deep latch  Hand expression demonstrated as well  Enc her to call for lactation support as needed throughout her stay  Hand pump provided       Diana Dominguez RN 8/6/2021 3:59 PM

## 2021-08-06 NOTE — PLAN OF CARE
Problem: PAIN - ADULT  Goal: Verbalizes/displays adequate comfort level or baseline comfort level  Description: Interventions:  - Encourage patient to monitor pain and request assistance  - Assess pain using appropriate pain scale  - Administer analgesics based on type and severity of pain and evaluate response  - Implement non-pharmacological measures as appropriate and evaluate response  - Consider cultural and social influences on pain and pain management  - Notify physician/advanced practitioner if interventions unsuccessful or patient reports new pain  Outcome: Progressing     Problem: INFECTION - ADULT  Goal: Absence or prevention of progression during hospitalization  Description: INTERVENTIONS:  - Assess and monitor for signs and symptoms of infection  - Monitor lab/diagnostic results  - Monitor all insertion sites, i e  indwelling lines, tubes, and drains  - Monitor endotracheal if appropriate and nasal secretions for changes in amount and color  - [place] appropriate cooling/warming therapies per order  - Administer medications as ordered  - Instruct and encourage patient and family to use good hand hygiene technique  - Identify and instruct in appropriate isolation precautions for identified infection/condition  Outcome: Progressing  Goal: Absence of fever/infection during neutropenic period  Description: INTERVENTIONS:  - Monitor WBC    Outcome: Progressing     Problem: SAFETY ADULT  Goal: Patient will remain free of falls  Description: INTERVENTIONS:  - Educate patient/family on patient safety including physical limitations  - Instruct patient to call for assistance with activity   - Consult OT/PT to assist with strengthening/mobility   - Keep Call bell within reach  - Keep bed low and locked with side rails adjusted as appropriate  - Keep care items and personal belongings within reach  - Initiate and maintain comfort rounds  - Make Fall Risk Sign visible to staff  - Apply yellow socks and bracelet for high fall risk patients  - Consider moving patient to room near nurses station  Outcome: Progressing  Goal: Maintain or return to baseline ADL function  Description: INTERVENTIONS:  -  Assess patient's ability to carry out ADLs; assess patient's baseline for ADL function and identify physical deficits which impact ability to perform ADLs (bathing, care of mouth/teeth, toileting, grooming, dressing, etc )  - Assess/evaluate cause of self-care deficits   - Assess range of motion  - Assess patient's mobility; develop plan if impaired  - Assess patient's need for assistive devices and provide as appropriate  - Encourage maximum independence but intervene and supervise when necessary  - Involve family in performance of ADLs  - Assess for home care needs following discharge   - Consider OT consult to assist with ADL evaluation and planning for discharge  - Provide patient education as appropriate  Outcome: Progressing  Goal: Maintains/Returns to pre admission functional level  Description: INTERVENTIONS:  - Perform BMAT or MOVE assessment daily    - Set and communicate daily mobility goal to care team and patient/family/caregiver  - Collaborate with rehabilitation services on mobility goals if consulted  - Out of bed for toileting  - Record patient progress and toleration of activity level   Outcome: Progressing     Problem: Knowledge Deficit  Goal: Patient/family/caregiver demonstrates understanding of disease process, treatment plan, medications, and discharge instructions  Description: Complete learning assessment and assess knowledge base    Interventions:  - Provide teaching at level of understanding  - Provide teaching via preferred learning methods  Outcome: Progressing     Problem: DISCHARGE PLANNING  Goal: Discharge to home or other facility with appropriate resources  Description: INTERVENTIONS:  - Identify barriers to discharge w/patient and caregiver  - Arrange for needed discharge resources and transportation as appropriate  - Identify discharge learning needs (meds, wound care, etc )  - Arrange for interpretive services to assist at discharge as needed  - Refer to Case Management Department for coordinating discharge planning if the patient needs post-hospital services based on physician/advanced practitioner order or complex needs related to functional status, cognitive ability, or social support system  Outcome: Progressing     Problem: POSTPARTUM  Goal: Experiences normal postpartum course  Description: INTERVENTIONS:  - Monitor maternal vital signs  - Assess uterine involution and lochia  Outcome: Progressing  Goal: Appropriate maternal -  bonding  Description: INTERVENTIONS:  - Identify family support  - Assess for appropriate maternal/infant bonding   -Encourage maternal/infant bonding opportunities  - Referral to  or  as needed  Outcome: Progressing  Goal: Establishment of infant feeding pattern  Description: INTERVENTIONS:  - Assess breast/bottle feeding  - Refer to lactation as needed  Outcome: Progressing  Goal: Incision(s), wounds(s) or drain site(s) healing without S/S of infection  Description: INTERVENTIONS  - Assess and document dressing, incision, wound bed, drain sites and surrounding tissue  - Provide patient and family education  Outcome: Progressing

## 2021-08-06 NOTE — PROGRESS NOTES
Progress Note - OB/GYN   [facility] Jc Castellon 30 y o  female MRN: [id number]  Unit/Bed#:  325-01 Encounter: [id number]    Assessment:  Post partum Day #1 s/p RLTCS and BS, stable, baby in nursery  Blood pressures: 90s-110s/50s-80s    Plan:  #1  Postoperative state s/p LTCS  - , Hgb 11 4 --> 8 5, Venofer ordered  - Pain well controlled with oral analgesics  - Tolerating PO fluids and solids  - King catheter removed this morning, f/u void trial  - DVT ppx with Lovenox 40 mg daily    #2  Fever  - Fever to 103 1 noted on 8/5/21 at 2300, afebrile since  - Unasyn 24hr postop  - Uterine fundus tender to palpation    #3  Anemia  - S/p Venofer on 7/25/21  - , Hgb 11 4 --> 8 5, Venofer ordered    #4  Continue routine post partum care  - Encourage ambulation  - Encourage breastfeeding  - Anticipate discharge PPD3       Subjective:   : [id number]  Post delivery  Patient is doing well  Lochia WNL  Pain well controlled       Pain: yes, cramping, improved with meds  Tolerating PO: yes  Voiding: yes  Flatus: yes  BM: no  Ambulating: yes  Breastfeeding:  yes  Chest pain: no  Shortness of breath: no  Leg pain: no  Lochia: WNL      Objective:     Vitals:   Vitals:    08/05/21 1955 08/05/21 2300 08/05/21 2352 08/06/21 0400   BP: 98/54  95/52 94/51   BP Location: Left arm  Left arm Left arm   Pulse: (!) 113  (!) 111 97   Resp: 18  18 18   Temp: 99 4 °F (37 4 °C) (!) 103 1 °F (39 5 °C) 98 7 °F (37 1 °C) 98 7 °F (37 1 °C)   TempSrc: Oral Oral Oral Oral   SpO2: 98%  98% 98%   Weight:       Height:             Intake/Output Summary (Last 24 hours) at 8/6/2021 0817  Last data filed at 8/6/2021 0400  Gross per 24 hour   Intake 3750 ml   Output 2078 ml   Net 1672 ml       Lab Results   Component Value Date    WBC 7 93 08/06/2021    HGB 8 5 (L) 08/06/2021    HCT 26 7 (L) 08/06/2021    MCV 88 08/06/2021     08/06/2021       Physical Exam:     Gen: NAD  CV: RRR  Lungs: CTA b/l  Abd: Soft, non-tender, non-distended, no rebound or guarding  Uterine fundus firm and non-tender, 1 cm below the umbilicus     Ext: Non tender    Incision: c/d/i      Maricarmen Griffin MD  OB/GYN PGY-1  8/6/2021  6:33 AM

## 2021-08-06 NOTE — PLAN OF CARE
Problem: PAIN - ADULT  Goal: Verbalizes/displays adequate comfort level or baseline comfort level  Description: Interventions:  - Encourage patient to monitor pain and request assistance  - Assess pain using appropriate pain scale  - Administer analgesics based on type and severity of pain and evaluate response  - Implement non-pharmacological measures as appropriate and evaluate response  - Consider cultural and social influences on pain and pain management  - Notify physician/advanced practitioner if interventions unsuccessful or patient reports new pain  Outcome: Progressing     Problem: INFECTION - ADULT  Goal: Absence or prevention of progression during hospitalization  Description: INTERVENTIONS:  - Assess and monitor for signs and symptoms of infection  - Monitor lab/diagnostic results  - Monitor all insertion sites, i e  indwelling lines, tubes, and drains  - Monitor endotracheal if appropriate and nasal secretions for changes in amount and color  - [place] appropriate cooling/warming therapies per order  - Administer medications as ordered  - Instruct and encourage patient and family to use good hand hygiene technique  - Identify and instruct in appropriate isolation precautions for identified infection/condition  Outcome: Progressing  Goal: Absence of fever/infection during neutropenic period  Description: INTERVENTIONS:  - Monitor WBC    Outcome: Progressing     Problem: SAFETY ADULT  Goal: Patient will remain free of falls  Description: INTERVENTIONS:  - Educate patient/family on patient safety including physical limitations  - Instruct patient to call for assistance with activity   - Consult OT/PT to assist with strengthening/mobility   - Keep Call bell within reach  - Keep bed low and locked with side rails adjusted as appropriate  - Keep care items and personal belongings within reach  - Initiate and maintain comfort rounds  - Make Fall Risk Sign visible to staff  - Offer Toileting every ours, in advance of need  - Initiate/Maintain alarm  - Obtain necessary fall risk management equipment: - Apply yellow socks and bracelet for high fall risk patients  - Consider moving patient to room near nurses station  Outcome: Progressing  Goal: Maintain or return to baseline ADL function  Description: INTERVENTIONS:  -  Assess patient's ability to carry out ADLs; assess patient's baseline for ADL function and identify physical deficits which impact ability to perform ADLs (bathing, care of mouth/teeth, toileting, grooming, dressing, etc )  - Assess/evaluate cause of self-care deficits   - Assess range of motion  - Assess patient's mobility; develop plan if impaired  - Assess patient's need for assistive devices and provide as appropriate  - Encourage maximum independence but intervene and supervise when necessary  - Involve family in performance of ADLs  - Assess for home care needs following discharge   - Consider OT consult to assist with ADL evaluation and planning for discharge  - Provide patient education as appropriate  Outcome: Progressing  Goal: Maintains/Returns to pre admission functional level  Description: INTERVENTIONS:  - Perform BMAT or MOVE assessment daily    - Set and communicate daily mobility goal to care team and patient/family/caregiver  - Collaborate with rehabilitation services on mobility goals if consulted  -- Out of bed for toileting  - Record patient progress and toleration of activity level   Outcome: Progressing     Problem: Knowledge Deficit  Goal: Patient/family/caregiver demonstrates understanding of disease process, treatment plan, medications, and discharge instructions  Description: Complete learning assessment and assess knowledge base    Interventions:  - Provide teaching at level of understanding  - Provide teaching via preferred learning methods  Outcome: Progressing     Problem: DISCHARGE PLANNING  Goal: Discharge to home or other facility with appropriate resources  Description: INTERVENTIONS:  - Identify barriers to discharge w/patient and caregiver  - Arrange for needed discharge resources and transportation as appropriate  - Identify discharge learning needs (meds, wound care, etc )  - Arrange for interpretive services to assist at discharge as needed  - Refer to Case Management Department for coordinating discharge planning if the patient needs post-hospital services based on physician/advanced practitioner order or complex needs related to functional status, cognitive ability, or social support system  Outcome: Progressing     Problem: POSTPARTUM  Goal: Experiences normal postpartum course  Description: INTERVENTIONS:  - Monitor maternal vital signs  - Assess uterine involution and lochia  Outcome: Progressing  Goal: Appropriate maternal -  bonding  Description: INTERVENTIONS:  - Identify family support  - Assess for appropriate maternal/infant bonding   -Encourage maternal/infant bonding opportunities  - Referral to  or  as needed  Outcome: Progressing  Goal: Establishment of infant feeding pattern  Description: INTERVENTIONS:  - Assess breast/bottle feeding  - Refer to lactation as needed  Outcome: Progressing  Goal: Incision(s), wounds(s) or drain site(s) healing without S/S of infection  Description: INTERVENTIONS  - Assess and document dressing, incision, wound bed, drain sites and surrounding tissue  - Provide patient and family education  - Perform skin care/dressing changes every Outcome: Progressing

## 2021-08-07 VITALS
SYSTOLIC BLOOD PRESSURE: 107 MMHG | TEMPERATURE: 98.9 F | WEIGHT: 174 LBS | HEART RATE: 99 BPM | HEIGHT: 60 IN | DIASTOLIC BLOOD PRESSURE: 59 MMHG | RESPIRATION RATE: 18 BRPM | OXYGEN SATURATION: 97 % | BODY MASS INDEX: 34.16 KG/M2

## 2021-08-07 PROCEDURE — 99024 POSTOP FOLLOW-UP VISIT: CPT | Performed by: OBSTETRICS & GYNECOLOGY

## 2021-08-07 RX ORDER — IBUPROFEN 600 MG/1
600 TABLET ORAL EVERY 6 HOURS PRN
Qty: 30 TABLET | Refills: 0 | Status: SHIPPED | OUTPATIENT
Start: 2021-08-07 | End: 2021-08-16 | Stop reason: SDUPTHER

## 2021-08-07 RX ORDER — OXYCODONE HYDROCHLORIDE 5 MG/1
5 TABLET ORAL EVERY 4 HOURS PRN
Qty: 10 TABLET | Refills: 0 | Status: SHIPPED | OUTPATIENT
Start: 2021-08-07 | End: 2021-08-17

## 2021-08-07 RX ADMIN — IBUPROFEN 600 MG: 600 TABLET ORAL at 12:52

## 2021-08-07 RX ADMIN — ACETAMINOPHEN 650 MG: 325 TABLET, FILM COATED ORAL at 17:39

## 2021-08-07 RX ADMIN — SODIUM CHLORIDE 3 G: 9 INJECTION, SOLUTION INTRAVENOUS at 01:30

## 2021-08-07 RX ADMIN — ACETAMINOPHEN 650 MG: 325 TABLET, FILM COATED ORAL at 08:26

## 2021-08-07 RX ADMIN — IBUPROFEN 600 MG: 600 TABLET ORAL at 01:20

## 2021-08-07 RX ADMIN — DOCUSATE SODIUM 100 MG: 100 CAPSULE ORAL at 08:26

## 2021-08-07 RX ADMIN — Medication 1 TABLET: at 08:26

## 2021-08-07 RX ADMIN — SODIUM CHLORIDE 3 G: 9 INJECTION, SOLUTION INTRAVENOUS at 08:26

## 2021-08-07 RX ADMIN — DOCUSATE SODIUM 100 MG: 100 CAPSULE ORAL at 17:39

## 2021-08-07 RX ADMIN — ENOXAPARIN SODIUM 40 MG: 40 INJECTION SUBCUTANEOUS at 08:26

## 2021-08-07 NOTE — PROGRESS NOTES
Progress Note - OB/GYN   [facility] 30 [facility]  [facility] MRN: [id number]  Unit/Bed#:  325-01 Encounter: [id number]    Assessment:  Post partum Day #2 s/p RLTCS +BS (chronic abruption), stable, baby in room    Plan:  1) Postoperative state   , Hgb 11 4 --> 8 5, s/p venofer   Spontaneously voiding   DVT ppx: SCDs, Lovenox 40 sc qd  2) III   S/p Unasyn x24h   Afebrile >24h, VSS  3) Continue routine post partum care   Encourage ambulation   Encourage breastfeeding   Anticipate discharge today POD#2 vs tomorrow POD#3     Subjective/Objective   Chief Complaint:     [place]  #[id number] used for translation  Post delivery  Patient is doing well  Lochia WNL  Pain well controlled  She would like to go home today if both her and the baby are cleared for discharge  Subjective:     Pain: yes, cramping, improved with meds  Tolerating PO: yes  Voiding: yes  Flatus: yes  BM: no  Ambulating: yes  Chest pain: no  Shortness of breath: no  Leg pain: no  Lochia: minimal    Objective:     Vitals: /59   Pulse 100   Temp 99 °F (37 2 °C) (Oral)   Resp 18   Ht 5' (1 524 m)   Wt 78 9 kg (174 lb)   LMP 11/21/2020 (Exact Date)   SpO2 99%   Breastfeeding Yes   BMI 33 98 kg/m²     I/O       08/05 0701 - 08/06 0700 08/06 0701 - 08/07 0700 08/07 0701 - 08/08 0700    I V  (mL/kg) 2750 (34 9)      IV Piggyback 1000      Total Intake(mL/kg) 3750 (47 5)      Urine (mL/kg/hr) 1425 (0 8) 1600 (0 8)     Blood 653      Total Output 2078 1600     Net +1672 -1600                  Lab Results   Component Value Date    WBC 7 93 08/06/2021    HGB 8 5 (L) 08/06/2021    HCT 26 7 (L) 08/06/2021    MCV 88 08/06/2021     08/06/2021       Physical Exam:     Gen: AAOx3, NAD  CV: RRR  Lungs: CTA b/l  Abd: Soft, non-tender, non-distended, no rebound or guarding  Uterine fundus firm and non-tender, 1 cm below the umbilicus   Incision c/d/I  Ext: Non tender    Van Obrien MD  8/7/2021  8:30 AM

## 2021-08-07 NOTE — PLAN OF CARE
Problem: PAIN - ADULT  Goal: Verbalizes/displays adequate comfort level or baseline comfort level  Description: Interventions:  - Encourage patient to monitor pain and request assistance  - Assess pain using appropriate pain scale  - Administer analgesics based on type and severity of pain and evaluate response  - Implement non-pharmacological measures as appropriate and evaluate response  - Consider cultural and social influences on pain and pain management  - Notify physician/advanced practitioner if interventions unsuccessful or patient reports new pain  8/7/2021 0743 by Eileen Guevara RN  Outcome: Progressing  8/7/2021 0743 by Eileen Guevara RN  Outcome: Progressing     Problem: INFECTION - ADULT  Goal: Absence or prevention of progression during hospitalization  Description: INTERVENTIONS:  - Assess and monitor for signs and symptoms of infection  - Monitor lab/diagnostic results  - Monitor all insertion sites, i e  indwelling lines, tubes, and drains  - Monitor endotracheal if appropriate and nasal secretions for changes in amount and color  - [place] appropriate cooling/warming therapies per order  - Administer medications as ordered  - Instruct and encourage patient and family to use good hand hygiene technique  - Identify and instruct in appropriate isolation precautions for identified infection/condition  8/7/2021 0743 by Eileen Guevara RN  Outcome: Progressing  8/7/2021 0743 by Eileen Guevara RN  Outcome: Progressing  Goal: Absence of fever/infection during neutropenic period  Description: INTERVENTIONS:  - Monitor WBC    8/7/2021 0743 by Eileen Guevara RN  Outcome: Progressing  8/7/2021 0743 by Eileen Guevara RN  Outcome: Progressing     Problem: SAFETY ADULT  Goal: Patient will remain free of falls  Description: INTERVENTIONS:  - Educate patient/family on patient safety including physical limitations  - Instruct patient to call for assistance with activity   - Consult OT/PT to assist with strengthening/mobility   - Keep Call bell within reach  - Keep bed low and locked with side rails adjusted as appropriate  - Keep care items and personal belongings within reach  - Initiate and maintain comfort rounds  - Make Fall Risk Sign visible to staff  - Offer Toileting every  Hours, in advance of need  - Initiate/Maintain alarm  - Obtain necessary fall risk management equipment:   - Apply yellow socks and bracelet for high fall risk patients  - Consider moving patient to room near nurses station  8/7/2021 0743 by Uzair Lozoya RN  Outcome: Progressing  8/7/2021 0743 by Uzair Lozoya RN  Outcome: Progressing  Goal: Maintain or return to baseline ADL function  Description: INTERVENTIONS:  -  Assess patient's ability to carry out ADLs; assess patient's baseline for ADL function and identify physical deficits which impact ability to perform ADLs (bathing, care of mouth/teeth, toileting, grooming, dressing, etc )  - Assess/evaluate cause of self-care deficits   - Assess range of motion  - Assess patient's mobility; develop plan if impaired  - Assess patient's need for assistive devices and provide as appropriate  - Encourage maximum independence but intervene and supervise when necessary  - Involve family in performance of ADLs  - Assess for home care needs following discharge   - Consider OT consult to assist with ADL evaluation and planning for discharge  - Provide patient education as appropriate  8/7/2021 0743 by Uzair Lozoya RN  Outcome: Progressing  8/7/2021 0743 by Uzair Lozoya RN  Outcome: Progressing  Goal: Maintains/Returns to pre admission functional level  Description: INTERVENTIONS:  - Perform BMAT or MOVE assessment daily    - Set and communicate daily mobility goal to care team and patient/family/caregiver  - Collaborate with rehabilitation services on mobility goals if consulted  - Perform Range of Motion  times a day  - Reposition patient every  hours    - Dangle patient  times a day  - Stand patient  times a day  - Ambulate patient  times a day  - Out of bed to chair  times a day   - Out of bed for meals  times a day  - Out of bed for toileting  - Record patient progress and toleration of activity level   2021 by Freida Callahan RN  Outcome: Progressing  2021 by Freida Callahan RN  Outcome: Progressing     Problem: Knowledge Deficit  Goal: Patient/family/caregiver demonstrates understanding of disease process, treatment plan, medications, and discharge instructions  Description: Complete learning assessment and assess knowledge base    Interventions:  - Provide teaching at level of understanding  - Provide teaching via preferred learning methods  2021 by Freida Callahan RN  Outcome: Progressing  2021 by Freida Callahan RN  Outcome: Progressing     Problem: DISCHARGE PLANNING  Goal: Discharge to home or other facility with appropriate resources  Description: INTERVENTIONS:  - Identify barriers to discharge w/patient and caregiver  - Arrange for needed discharge resources and transportation as appropriate  - Identify discharge learning needs (meds, wound care, etc )  - Arrange for interpretive services to assist at discharge as needed  - Refer to Case Management Department for coordinating discharge planning if the patient needs post-hospital services based on physician/advanced practitioner order or complex needs related to functional status, cognitive ability, or social support system  2021 by Freida Callahan RN  Outcome: Progressing  2021 by Freida Callahan RN  Outcome: Progressing     Problem: POSTPARTUM  Goal: Experiences normal postpartum course  Description: INTERVENTIONS:  - Monitor maternal vital signs  - Assess uterine involution and lochia  2021 by Freida Callahan RN  Outcome: Progressing  2021 by Freida Callahan RN  Outcome: Progressing  Goal: Appropriate maternal -  bonding  Description: INTERVENTIONS:  - Identify family support  - Assess for appropriate maternal/infant bonding   -Encourage maternal/infant bonding opportunities  - Referral to  or  as needed  8/7/2021  by Ten Ybarra RN  Outcome: Progressing  8/7/2021 0743 by Ten Ybarra RN  Outcome: Progressing  Goal: Establishment of infant feeding pattern  Description: INTERVENTIONS:  - Assess breast/bottle feeding  - Refer to lactation as needed  8/7/2021 0743 by Ten Ybarra RN  Outcome: Progressing  8/7/2021 0743 by Ten Ybarra RN  Outcome: Progressing  Goal: Incision(s), wounds(s) or drain site(s) healing without S/S of infection  Description: INTERVENTIONS  - Assess and document dressing, incision, wound bed, drain sites and surrounding tissue  - Provide patient and family education  - Perform skin care/dressing changes every   8/7/2021 0743 by Ten Ybarra RN  Outcome: Progressing  8/7/2021 0743 by Ten Ybarra RN  Outcome: Progressing

## 2021-08-09 LAB
ABO GROUP BLD BPU: NORMAL
ABO GROUP BLD BPU: NORMAL
BPU ID: NORMAL
BPU ID: NORMAL
CROSSMATCH: NORMAL
CROSSMATCH: NORMAL
UNIT DISPENSE STATUS: NORMAL
UNIT DISPENSE STATUS: NORMAL
UNIT PRODUCT CODE: NORMAL
UNIT PRODUCT CODE: NORMAL
UNIT PRODUCT VOLUME: 350 ML
UNIT PRODUCT VOLUME: 350 ML
UNIT RH: NORMAL
UNIT RH: NORMAL

## 2021-08-16 ENCOUNTER — POSTPARTUM VISIT (OUTPATIENT)
Dept: OBGYN CLINIC | Facility: CLINIC | Age: 36
End: 2021-08-16

## 2021-08-16 VITALS
TEMPERATURE: 98.3 F | WEIGHT: 170 LBS | DIASTOLIC BLOOD PRESSURE: 72 MMHG | HEART RATE: 65 BPM | HEIGHT: 60 IN | SYSTOLIC BLOOD PRESSURE: 119 MMHG | BODY MASS INDEX: 33.38 KG/M2

## 2021-08-16 DIAGNOSIS — Z98.891 S/P REPEAT LOW TRANSVERSE C-SECTION: ICD-10-CM

## 2021-08-16 PROCEDURE — 99213 OFFICE O/P EST LOW 20 MIN: CPT | Performed by: NURSE PRACTITIONER

## 2021-08-16 RX ORDER — IBUPROFEN 600 MG/1
600 TABLET ORAL EVERY 6 HOURS PRN
Qty: 30 TABLET | Refills: 0 | Status: SHIPPED | OUTPATIENT
Start: 2021-08-16

## 2021-08-16 NOTE — PROGRESS NOTES
POSTPARTUM VISIT    Matias Schaumann presents today for postpartum visit  She had a  delivery on 21  Complications included placental abruption, pre-term delivery and intrapratum intramniotic infection requiring 24 hr of unasy  She is breast feeding her infant and reports no issues with such  A BTL was performed during the  for contraception  She was provided with [employer] Depression Screening tool and her score was 0  Review of Systems:   -Constitutional: denies issues, denies pain   -Breasts: denies tenderness   -Gynecologic: lochia scant    -Urinary: denies issues urinating   -GI: stools WNL, denies issues    Physical Exam:   -Vitals:   Vitals:    21 1559   BP: 119/72   Pulse: 65   Temp: 98 3 °F (36 8 °C)   TempSrc: Oral   Weight: 77 1 kg (170 lb)   Height: 5' (1 524 m)      -General: A&Ox3, no acute distress noted   -Abdomen: soft, non-tender, incision appears clean/dry/intact and well-healed   -Extremities: nontender, no edema noted   -Breasts: deferred        -Pelvic exam:    Deferred     Assessment/Plan:  1  Normal postpartum exam   2  Depression screening negative  3  Last pap smear was done 2021 and result was NILM, HR-HPV negative  Advise return for next annual GYN exam in May 2022    4  Contraception: BTL

## 2021-08-16 NOTE — PATIENT INSTRUCTIONS
DESPUÉS DEL PARTO      Debe comunicarse con griffin proveedor de GINECÓLOGO si usted experimenta cualquiera de los siguientes:  1  sangrado que empapa herbie almohadilla cada hora risa 2 horas  2  mal olor procedente de la vagina  3  fiebre de 100 4 o superior  4  incisión o dolor abdominal que no irá lejos a pesar de prescribe medicamentos para el dolor  5  hinchazón, enrojecimiento, secreción o sangrado de la incisión de cesárea o sitio de desgarro perineal   6  la incisión se comienza a separar  7  problemas para orinar incluyendo incapacidad para orinar, ardor al [place] o muy oscura de la [place]  8  no movimiento intestinal dentro de 4 días de omero a sienna, o la dificultad con las deposiciones después de eso  9  cualquier tipo de disturbio visual (visión doble, Desenfoque, etcetera)  10  cefalea  11  gripe-chas síntomas  12  dolor o enrojecimiento en lurdes o ambos de keenan pechos  13  dolor, calor, sensibilidad o hinchazón en las piernas, especialmente la jonathan de la pantorrilla  14  frecuentes náuseas y vómitos  15  signos de depresión o ansiedad  16  Si experimenta isreal de pecho o tiene problemas para respirar, llame al 911 inmediatamente  En general puede reanudar el coito sexual después de 6 semanas de la entrega  Es importante continuar cambiando tus toallas sanitarias con frecuencia y limpiar el perineo al menos 2 - 3 veces al día  Mantenga la incisión abdominal después de entrega cesariana limpio y seco en todo momento  POSTPARTUM    You should contact your OBGYN provider if you experience any of the followin  Bleeding that soaks a pad every hour for 2 hours  2  Foul odor coming from your vagina  3  Fever of 100 4 or higher  4  Incision or abdominal pain that will not go away despite prescribed pain medications  5  Swelling, redness, discharge or bleeding from your  incision or perineal tear site  6  Your incision begins to separate     7  Problems urinating including inability to urinate, burning while urinating or extremely dark urine  8  No bowel movement within 4 days of giving birth, or difficulty with bowel movements after that  9  Any type of visual disturbance (double vision, blurring, etc)  10  Severe headache  11  Flu-like symptoms  12  Pain or redness in one or both of your breasts  13  Pain, warmth, tenderness or swelling in your legs, especially the calf area  14  Frequent nausea and vomiting  15  Signs of depression or anxiety  16  If you experience chest pains or have problems breathing, call 911 immediately  In general you may resume sexual vaginal intercourse after 6 weeks of delivery  It is important to continue changing your sanitary pads frequently and clean the perineum at least 2-3 times daily  Keep abdominal incision after  delivery clean and dry at all times

## 2021-08-20 NOTE — UTILIZATION REVIEW
URGENT/EMERGENT  INPATIENT/SPU AUTHORIZATION REQUEST    Date: 9/8/2021            # Pages in this Request:     X New Request   Additional Information for PA#:     Office Contact Name:  Nieves Cheatham Title: Utilization Review, Bob Nurse     Phone: [phone]  Ext  Availability (Date/Time): Wednesday - Friday 8 am- 4 pm    x Inpatient Review  SPU Review       x Current        Late Pick-up   · How your facility was first notified of the Late Pick-up:  · When your facility was first notified of the Late Pick-up (date):         RECIPIENT INFORMATION    Recipient ID#: Jamee Stevenson    Recipient Name:  [id number]    YOB: 1985  30 y o  Recipient Alias:     Gender:   Male X Female Medicaid Eligibility ([address] [place] [address]): INSURANCE INFORMATION    (All other private or governmental health insurance benefits must be utilized prior to billing the [place] Program)    Was this admission the result of an MVA, other accident, assault, injury, fall, gunshot, bite etc ? Yes x No                   If yes, provide a brief description of the incident  Does the recipient have other insurance coverage? Yes x No        Insurance Company Name/Policy #      Did that insurance pay on this claim? Yes  No        Did that insurance deny this claim? Yes  No    If yes, reason for denial:      Does the recipient have Medicare? Yes x No        Did Medicare exhaust prior to this admission? Yes  No        Did Medicare partially pay this claim? Yes  No        Did that insurance deny this claim? Yes  No    If yes, reason for denial:          Was the recipient a prisoner at the time of admission? Yes x No            PROVIDER INFORMATION    [facility] Provider ID#: [id number]    Admitting Physician Name:  [address] [place] [address] Provider ID#: [id number]        ADMISSION INFORMATION    Type of Admission: (please choose one)     ED      Direct    If yes, from where? Transfer    If yes, transferring hospital (inpatient, rehab, or psych) Provider Name/Provider ID#: Admission Floor or Unit Type: Med Surg OB    Dates/Times:        ED Date/Time: 7/15/2021 12:01 PM        Observation Date/Time: 7/15/21  13:42        Admission Date/Time: 7/16/21  8:11 AM        Discharge or Transfer Date/Time: 7/19/2021  3:10 PM        DIAGNOSIS/PROCEDURE CODES    Primary Diagnosis Code/Primary Diagnosis Code description:  O45 93 Premature separation of placenta, unspecified, third trimester   O23 43 Unspecified infection of urinary tract in pregnancy, third trimester   O51 573 Obesity complicating pregnancy, third trimester   E66 9 Obesity, unspecified   Additional Diagnosis Code(s) and Description(s)-(up to three additional codes):    Procedure Code (one) and description:        CLINICAL INFORMATION - 2 [facility]    Is there a prior admission with a discharge date within 30 days of the date of this admission? X No (Proceed to the next section - "Clinical Information - General Review Checklist:)      Yes (Provide the following information)     Prior admission dates:    MA Prior Authorization Number:        Review Outcome:     Diagnosis Code(s)/Description:    Procedure Code/Description:    Findings:    Treatment:    Condition on Discharge:   Vitals:    Labs:   Imaging:   Medications: Follow-up Instructions:    Disposition:        CLINICAL INFORMATION - GENERAL REVIEW CHECKLIST    EMERGENCY DEPARTMENT: (Proceed to "ADMISSION" if Direct Admission)    Presenting Signs/Symptoms:  29 yo G 2 P 1 @ 33 5/7 weeks gestation presented to L&D as observation for vaginal bleeding On US chronic abruption found  Patient states bleeding heavy with clots and a little pain around suprapubic area  She recently transferred care to [facility] from [facility] at 24 weeks  (+) BTM  X 1 course   Plan NST TID,SCD  FHT:  Baseline Rate: 135 bpm  Variability: Moderate 6-25 bpm  Accelerations: 15 x 15 or greater, At variable times  Decelerations: None  FHR Category: Category I     TOCO:   Contraction Frequency (minutes): 7-8  Contraction Duration (seconds):   Contraction Quality: Mild       Medication/treatment prior to arrival in the ED:    Past Medical History:     Past Medical History:   Diagnosis Date    Varicella        Clinical Exam:    Initial Vital Signs: (Temp, Pulse, Resp, and BP)    Vitals   Temperature Pulse Respirations Blood Pressure SpO2   07/15/21 1221 07/15/21 1221 07/15/21 1221 07/15/21 1221 07/16/21 0900   (!) 96 6 °F (35 9 °C) 105 20 110/58 98 %      Temp Source Heart Rate Source Patient Position - Orthostatic VS BP Location FiO2 (%)   07/15/21 1221 07/15/21 1221 07/16/21 2340 07/15/21 1221 --   Temporal Monitor Sitting Right arm       Pain Score       07/15/21 1221       No Pain           Pertinent Repeat Vital Signs: (include times they were obtained)  Date/Time   Temp   Pulse   Resp   BP   SpO2   O2 Device   Cardiac (WDL)   07/16/21 0900   97 2 °F (36 2 °C)Abnormal    88   18   107/59   98 %   None (Room air)   WDL   07/16/21 0027   98 2 °F (36 8 °C)   95   20   102/62   --   --   --   07/15/21 1925   --   --   --   --   --   --   WDL   Comment rows:   OBSERV: pt offers no complaints at this time, she reports + fetal movement at 07/15/21 1925   07/15/21 1542   --   --   --   --   --   --   WDL   07/15/21 1503   97 7 °F (36 5 °C)   101   20   110/80   --   --   --   07/15/21 1400   --   103   --   118/75   --   --   --       Pertinent Sustained Findings: (include times they were obtained)    Weight in Kilograms:  Wt Readings from Last 1 Encounters:   08/16/21 77 1 kg (170 lb)       Pertinent Labs (results):  Results from last 7 days   Lab Units 07/16/21  0539 07/15/21  2048 07/15/21  1350   WBC Thousand/uL 14 11* 16 81* 20 08*   HEMOGLOBIN g/dL 9 3* 9 9* 10 1*   HEMATOCRIT % 29 5* 30 4* 31 1*   PLATELETS Thousands/uL 240 254  254 273   BANDS PCT %  --   --  1               Results from last 7 days   Lab Units 07/15/21  1350   SODIUM mmol/L 133*   POTASSIUM mmol/L 3 8   CHLORIDE mmol/L 102   CO2 mmol/L 17*   ANION GAP mmol/L 14*   BUN mg/dL 7   CREATININE mg/dL 0 71   EGFR ml/min/1 73sq m 111   CALCIUM mg/dL 8 9           Results from last 7 days   Lab Units 07/15/21  1350   AST U/L 15   ALT U/L 16   ALK PHOS U/L 132*   TOTAL PROTEIN g/dL 6 5   ALBUMIN g/dL 2 5*   TOTAL BILIRUBIN mg/dL 0 25               Results from last 7 days   Lab Units 07/15/21  1350   GLUCOSE RANDOM mg/dL 134             Results from last 7 days   Lab Units 07/16/21  0539 07/15/21  2048 07/15/21  1350   PROTIME seconds 13 5 13 5  13 6 13 4   INR   1 02 1 03 1 01   PTT seconds 22* 21*  21* 20*           Results from last 7 days   Lab Units 07/16/21  0557   AMPH/METH   Negative   BARBITURATE UR   Negative   BENZODIAZEPINE UR   Negative   COCAINE UR   Negative   METHADONE URINE   Negative   OPIATE UR   Negative   PCP UR   Negative   THC UR   Negative           Results from last 7 days   Lab Units 07/15/21  1350   TOTAL COUNTED   100           Radiology (results):    EKG (results): Other tests (results):    Tests pending final results:    Treatment in the ED:   Medication Administration - No Administrations Displayed (No Start Event Found)     None           Other treatments:      Change in condition while in the ED:     Response to ED Treatment:          OBSERVATION: (Proceed to "ADMISSION" if Direct Admission)    Orders written during the observation period  Meds Name, dose, route, time, how may doses given:  PRN Meds Name, dose, route, time, how many doses given within the first 24 hrs :  IVs Type, rate, and total amt   Ordered/given:  lactated ringers infusion   Rate: 100 mL/hr Dose: 100 mL/hr  Freq: Continuous Route: IV  Last Dose: Stopped (07/16/21 0849)  Start: 07/15/21 1500 End: 07/16/21 0809        Labs, imaging, other:  Consults and findings: Perinatology - 7/15 - ASSESSMENT:              30 y o  John Adriana  with IUP at 33w5d  gestation with placental abruption  PLAN:              Admit for obs              Current labs              No need to repeat corticosteroids at this time              Continuous EFM              CLD              Formal US tomorrow              Dual IV access       Test Results during the observation period  Pertinent Lab tests (dates/results):  Culture results (blood, urine, spinal, wound, respiratory, etc ):  Imaging tests (dates/results):  EKG (dates/results): Other test (dates/results):  Tests pending (dates/results):    Surgical or Invasive Procedures during the observation period  Name of surgery/procedure:  Date & Time:  Patient Response:  Post-operative orders:  Operative Report/Findings:    Response to Treatment, Major Change in Condition, Major Charge in Treatment during the observation period  OBS 07-15-21 @ 1342 CONVERTED TO INPATIENT ADMISSION 07-16-21 @ 0811 FOR CONTINUATION OF CARE @ 33 5/7 WEEKS GESTATION FOR VAGINAL BLEEDING   Date: 07-16-21  Day 2: Converted to inpatient for vaginal bleeding @ 33 6/7 wks   As Per MFM ultrasound finding overnight of a hypoechoic retroplacental collection, clinical suspicion for chronic abruption is high  She has mild anemia that is likely related to blood loss, without coagulopathy  Will continue to trend labs closely every 12 hours, and give venofer today   Maintain 2 Ivs and active type & screen at all times  Given reassuring fetal tracing and lack of bleeding, she does appear stable enough to transition to a regular diet and intermittent fetal monitoring today   She will remain in house for the forseeable future at this time given high chance of progression and need for delivery   (+) contractions overnight fetal heart tone reactive  continuous external monitoring         ADMISSION:    DIRECT Admissions Only:    · Presenting Signs/Symptoms:   ·   · Medication/treatment prior to arrival:  ·   · Past Medical History:  ·   · Clinical Exam on admission:  ·   · Vital Signs on admission: (Temp, Pulse, Resp, and BP)  ·   · Weight in kilograms:     ALL Admissions:    Admission Orders and Other Orders written within the first 24 hrs after admission  Meds Name, dose, route, time, how may doses given:    Venofer 300 mg iv once - 7/16 x1   LR 1000 ml Bolus - IV Once       PRN Meds Name, dose, route, time, how many doses given within the first 24 hrs :  Tylenol 650 mg po prn - 7/17 x3 - 7/18x 3   Fabrizio Lujan  Topical 7/18 x2     IVs Type, rate, and total amt  Ordered/given:  lactated ringers infusion   Rate: 125 mL/hr Dose: 125 mL/hr  Freq: Continuous Route: IV  Indications of Use: IV Hydration  Last Dose: Stopped (21 1900)  Start: 21 End: 21      Labs, imaging, other:  Lab Results   Component Value Date     WBC 11 55 (H) 2021     HGB 10 2 (L) 2021     HCT 31 0 (L) 2021     MCV 84 2021      2021         Lab Results   Component Value Date     CALCIUM 8 9 07/15/2021     K 3 8 07/15/2021     CO2 17 (L) 07/15/2021      07/15/2021     BUN 7 07/15/2021     CREATININE 0 71 07/15/2021             Consults and findings:    Test Results after admission  Pertinent Lab tests (dates/results):  Culture results (blood, urine, spinal, wound, respiratory, etc ):  Imaging tests (dates/results):  EKG (dates/results): Other test (dates/results):  Tests pending (dates/results):    Surgical or Invasive Procedures  Name of surgery/procedure:  Date & Time:  Patient Response:  Post-operative orders:  Operative Report/Findings:    Response to Treatment, Major Change in Condition, Major Charge in Treatment anytime during admission  HPI:  81-year-old  001 at 33 weeks gestation presenting with vaginal bleeding in the setting of a chronic placental abruption  She was admitted from  to  at [facility] is vaginal bleeding and was diagnosed with a placental abruption at that time    She was given a course of betamethasone         Hospital Course - Problem Based:  Patient is a 71-year-old  at 35 weeks who presented initially with an episode of vaginal bleeding in the setting of a chronic placental abruption  She was admitted for continuous fetal monitoring and formal ultrasound by Maternal-Fetal Medicine  On hospital day to her formal growth scan was performed and there were no signs of placental abruption noted an estimated fetal weight was noted to be in the 75th percentile  On hospital day 3 in to hospital day for a was noted that patient was having fetal tachycardia that resolved with IV fluids  UA showed signs of urinary tract infection, however patient denied any symptoms  On hospital day 5 patient was stable for discharge  She was discharged home on 5 days of amoxicillin for UTI showing group B strep  She was scheduled for follow-up in the office with her obstetrician weekly and twice weekly  testing  Her repeat  was scheduled for           Disposition on Discharge  Home, Rehab, SNF, LTC, Shelter, etc : Home/Self Care    Cease to Breathe (CTB)  If a patient expires during an admission, in addition to the above information, please include:    Summary/timeline of the patient's decline in condition:    Medications and treatment:    Patient response to treatment:    Date and time patient ceased to breathe:        Is there a Readmission that follows this admission?   X Yes  No    If yes, provide dates: -21  - Birth admission          InterQual Review    InterQual Criteria Met: x Yes  No  N/A        Please include the InterQual Review, InterQual year/version used, and the criteria selected:  Created Using Review Status Review Entered   [employer]  In [facility] 2021 16:46       Criteria Set Name - Subset   LOC:Acute Adult-Antepartum      Criteria Review   REVIEW SUMMARY     Patient: Beck Dupree  Review Number: [id number]  Review Status: In Primary  Criteria Status: Acute Met  Day Met: Episode Day 1     Condition Specific: Yes        OUTCOMES  Outcome Type: Primary           REVIEW DETAILS     Product: [facility] Adult  Subset: Antepartum      (Symptom or finding within 24h)         (Excludes PO medications unless noted)          [X] Select Day, One:              [X] Episode Day 1, One:                  [X] ACUTE, >= One:                      [X] Placenta abruptio and, One:                          [X] Mild-moderate abruption and, Both:                              [X] Fetal heart rate monitoring                              [X] Uterine monitoring     Version: InterQual® 2020  InterQual® and InterQual®  © 2020 [employer] and/or one of its subsidiaries  All Rights Reserved  CPT only © 2019 American [employer] Association  All Rights Reserved  PLEASE SUBMIT THE COMPLETED FORM TO THE DEPARTMENT OF HUMAN SERVICES - DIVISION OF CLINICAL  REVIEW VIA FAX -[phone] or VIA E-MAIL TO [email or web address]    Signature: Ledy Johnson Date:  08/20/21    Confidentiality Notice: The documents accompanying this telecopy may contain confidential information belonging to the sender  The information is intended only for the use of the individual named above  If you are not the intended recipient, you are hereby notified  That any disclosure, copying, distribution or taking of any telecopy is strictly prohibited

## 2022-09-20 ENCOUNTER — OFFICE VISIT (OUTPATIENT)
Dept: DENTISTRY | Facility: CLINIC | Age: 37
End: 2022-09-20

## 2022-09-20 VITALS — DIASTOLIC BLOOD PRESSURE: 78 MMHG | HEART RATE: 72 BPM | SYSTOLIC BLOOD PRESSURE: 117 MMHG | TEMPERATURE: 97.1 F

## 2022-09-20 DIAGNOSIS — K02.9 DENTAL DECAY: Primary | ICD-10-CM

## 2022-09-20 PROCEDURE — D0220 INTRAORAL - PERIAPICAL FIRST RADIOGRAPHIC IMAGE: HCPCS | Performed by: DENTIST

## 2022-09-20 PROCEDURE — D0140 LIMITED ORAL EVALUATION - PROBLEM FOCUSED: HCPCS | Performed by: DENTIST

## 2022-09-20 NOTE — PROGRESS NOTES
Limited Oral Evaluation    Jacque Bertokatya billings Ely Rogers presented to [facility] for a limited oral evaluation  PMH reviewed, no changes  [place] language line was used during appt  ASA: 2  Pain: 0/10, pt has sensitivity    PA radiograph of #13 taken  Tooth has adequate RCT (completed a year ago in [facility]) with possible fiber post  Caries noted on mesial with large restoration on tooth  Clinical exam revealed fracture of mesial portion of restoration with caries  Informed pt this tooth would need core with possible post with a crown and possible crown lengthening  Informed pt we can place a temporary filling material at this visit  Pt agreed  Cavit placed in mesial of #13 to avoid food trapping  Occlusion adjusted  Pt is interested in establishing care in our clinic  Pt left ambulatory and satisfied       NV: FMX/comp exam  NV2: Assess #13 (build up with possible post)

## 2022-09-23 ENCOUNTER — OFFICE VISIT (OUTPATIENT)
Dept: DENTISTRY | Facility: CLINIC | Age: 37
End: 2022-09-23

## 2022-09-23 VITALS — SYSTOLIC BLOOD PRESSURE: 114 MMHG | TEMPERATURE: 97.1 F | HEART RATE: 75 BPM | DIASTOLIC BLOOD PRESSURE: 73 MMHG

## 2022-09-23 DIAGNOSIS — Z01.20 ENCOUNTER FOR DENTAL EXAMINATION: Primary | ICD-10-CM

## 2022-09-23 PROCEDURE — D0150 COMPREHENSIVE ORAL EVALUATION - NEW OR ESTABLISHED PATIENT: HCPCS | Performed by: DENTIST

## 2022-09-23 PROCEDURE — D0210 INTRAORAL - COMPLETE SERIES OF RADIOGRAPHIC IMAGES: HCPCS | Performed by: DENTIST

## 2022-09-23 NOTE — PROGRESS NOTES
Comprehensive Exam    Lisa Garcia presents for a comprehensive exam  Verbal consent for treatment given in addition to the forms  Pt only speaks [place]  Translation line was used  Reviewed health history - Patient is ASA II   Consents signed: Yes    Perio: Stage 2 Grade A    Has calculus at localized areas with generalized BOP at all sites  Pain Scale: 3/10 mainly from tooth #13   Caries Assessment: HIGH   Radiographs: FMX was taken  Findings:   Pt presents with class 1 occlusion with severe crowding of maxillary and mandibular anterior teeth  Pt has several missing teeth causing neighboring teeth to tilt mesially  Occlusion needs to be reassessed as  #7 is rotated MF and #10 is lingually positioned  Findings:   Multiple restorations present on teeth with new findings that need to be addressed  #4 DO caries   #7 MFL defective restoration   #9 has previously RCT with large composite restoration with significant facial overhang -> needs post + core + crown   #13 previous RCT with large core build up and recurrent decay on the mesial     -> needs post+ core + crown   #14 MO   #30 DO  #32 MO   Due to mesialization of #2, #18 and #32, edentulous spcaes need to be re-evaluated to discuss options to replace them  Due to perio stage 2 grade A and radiographic/clinical presence of calculus, pt was treatment planned for selective SRPs in 4 quads  Pt was explained that pt will come back for treatment session to discuss her treatment plan in more detail  Pt agreed       NV:  SRPs  NV2: treatment planning session

## 2023-02-15 ENCOUNTER — OFFICE VISIT (OUTPATIENT)
Dept: DENTISTRY | Facility: CLINIC | Age: 38
End: 2023-02-15

## 2023-02-15 VITALS — TEMPERATURE: 98.7 F | DIASTOLIC BLOOD PRESSURE: 74 MMHG | HEART RATE: 80 BPM | SYSTOLIC BLOOD PRESSURE: 110 MMHG

## 2023-02-15 DIAGNOSIS — K05.30 PERIODONTITIS: Primary | ICD-10-CM

## 2023-02-15 NOTE — DENTAL PROCEDURE DETAILS
SRP in quads : UR    Reviewed meds/hhx in Epic  ASA: I    Patient speaks [place], used Google translate throughout appt  Anesthesia-  Topical gel benzocaine 20% was applied to tissue  Maxillary Right Buccal infiltrations, short, 3/4 carp 2% Lido w/ epi 1:100,000  Neg aspirations and no complications for all  Cavitron and hand instruments used  Bleeding: moderate  Supra/sub calculus was removed from tooth surfaces  Irrigated w/  12% Chlorhexidine Gluconate    OHI: reviewed with the patient the need to maintain proper oral hygiene regimen at home  Brushing 2x/day for 2 minutes, flossing subgingivally daily and mouthrinse 1-2x/day for 60 seconds  Advised patient periodontal disease is an oral disease we can treat and maintain but cannot cure  Without proper dental visits and proper at home dental care this disease may return  Patient understands  Following scaling and root planing, a 4-6 week Perio Re-eval is recommended, to evaluate whether patient will need further periodontal surgery  Recommend maintaining 3/4 month recalls      NV: LR SRP

## 2023-05-09 ENCOUNTER — ANNUAL EXAM (OUTPATIENT)
Dept: OBGYN CLINIC | Facility: CLINIC | Age: 38
End: 2023-05-09

## 2023-05-09 VITALS
WEIGHT: 161.4 LBS | BODY MASS INDEX: 30.47 KG/M2 | SYSTOLIC BLOOD PRESSURE: 109 MMHG | DIASTOLIC BLOOD PRESSURE: 75 MMHG | HEIGHT: 61 IN | HEART RATE: 91 BPM

## 2023-05-09 DIAGNOSIS — N64.4 BREAST PAIN, LEFT: Primary | ICD-10-CM

## 2023-05-09 DIAGNOSIS — Z00.00 ANNUAL PHYSICAL EXAM: ICD-10-CM

## 2023-05-09 PROBLEM — O98.512 COVID-19 AFFECTING PREGNANCY IN SECOND TRIMESTER: Status: RESOLVED | Noted: 2021-05-18 | Resolved: 2023-05-09

## 2023-05-09 PROBLEM — O99.891 BACK PAIN IN PREGNANCY: Status: RESOLVED | Noted: 2021-06-29 | Resolved: 2023-05-09

## 2023-05-09 PROBLEM — M54.9 BACK PAIN IN PREGNANCY: Status: RESOLVED | Noted: 2021-06-29 | Resolved: 2023-05-09

## 2023-05-09 PROBLEM — Z34.93 PRENATAL CARE IN THIRD TRIMESTER: Status: RESOLVED | Noted: 2021-05-18 | Resolved: 2023-05-09

## 2023-05-09 PROBLEM — O45.90 PLACENTAL ABRUPTION: Status: RESOLVED | Noted: 2021-08-05 | Resolved: 2023-05-09

## 2023-05-09 PROBLEM — O46.93 VAGINAL BLEEDING IN PREGNANCY, THIRD TRIMESTER: Status: RESOLVED | Noted: 2021-07-16 | Resolved: 2023-05-09

## 2023-05-09 PROBLEM — O34.219 PREGNANCY WITH HISTORY OF CESAREAN SECTION, ANTEPARTUM: Status: RESOLVED | Noted: 2021-05-18 | Resolved: 2023-05-09

## 2023-05-09 PROBLEM — U07.1 COVID-19 AFFECTING PREGNANCY IN SECOND TRIMESTER: Status: RESOLVED | Noted: 2021-05-18 | Resolved: 2023-05-09

## 2023-05-09 NOTE — PROGRESS NOTES
"OB/GYN  ANNUAL VISIT  Bert Marte  2023  1:19 PM  Dr Babatunde Salvador MD   Subjective      Bert Marte is a 40 y o  female who presents for annual well woman exam  Periods are regular every 28-30 days, lasting 5 days  Patient states heavy bleeding after her last  2 years ago    GYN:  · Denies vaginal discharge, labial erythema or lesions, dyspareunia  · Menarche at 14  · Menses are regular, q 28 days, lasting 5 days  · Contraception: BTL  · Patient is  sexually active with one male partner  · Last pap smear was in  cotesting   Results were negative  · Gynecologic surgeries  1 tubal ligation at time of her last C/S  2 C/S    OB:  · [id number] female  · Pregnancies were complicated by placental abruption in both pregancies    :  · Denies dysuria, urinary frequency or urgency  · Denies hematuria, flank pain, incontinence  Breast:  · Denies breast mass, skin changes, dimpling, reddening, nipple retraction  But states some pain in her left breast, patient is not breastfeeding  · Denies breast discharge  · Patient does not  have a family history of breast, endometrial, or ovarian ca  General:  · Diet: no  · Exercise: no  · Work: no  · ETOH use: no  · Tobacco use: no  · Recreational drug use: no    Screening:  · Cervical cancer: Last pap smear was in  cotesting   Results were negative  · STD screening: denies STD history      Review of Systems  Pertinent items are noted in HPI  Objective      /75 (BP Location: Right arm)   Pulse 91   Ht 5' 0 63\" (1 54 m)   Wt 73 2 kg (161 lb 6 4 oz)   LMP 2023 (Exact Date)   BMI 30 87 kg/m²     Physical Exam  Constitutional:       Appearance: She is well-developed  HENT:      Head: Normocephalic and atraumatic  Eyes:      Pupils: Pupils are equal, round, and reactive to light  Cardiovascular:      Rate and Rhythm: Normal rate     Pulmonary:      Effort: Pulmonary effort is normal    Chest:         " Comments: Approximately 1 cm diameter induration palpated at 8 o cloc, 1 cm from the areolar edge, mild tenderness  no skin changes, no axillar adenopathies    Abdominal:      Palpations: Abdomen is soft  Genitourinary:     Vagina: Normal    Musculoskeletal:      Cervical back: Normal range of motion  Neurological:      Mental Status: She is alert and oriented to person, place, and time  Cardio-pulmonar  Normal vesicular murmur, no rales,  nonlabored breathing , normal S1/S2 no cardiac murmurs , no gallops   Abdomen  Soft , no tender, no signs or peritoneal irritation   Extremities  mobiles no edemas    Pelvic examination  Normal appearing external genitals, no skin lesions in vulvar area, anteverted  Uterus  No pain with mobilization  No evidence of vaginal discharge or bleeding  No masses  Assessment/Plan     Apolinar Winter is a 40 y o  female who presents for:    Annual well woman exam  - In this visit she do not desire STD testing  - Last  pap smear was in 2021 cotesting   Results were negative  , pelvic exam today unremarkable  - Anticonceptive method: BTL  - Breast examination with induration palpated at 8 o cloc, 1 cm from the areolar edge, mild tenderness  no skin changes, no axillar adenopathies, diagnostic mammogram ordered   - Reviewed topics of safe sex practices, depression, and domestic violence  - Reviewed screening guidelines for pap smears  Pap cytology are every 3 years and Pap/HPV cotesting is due very 5 years  - Emphasized importance of annual pelvic and breast exams  Family history reviewed regarding genetically inherited cancers  - All questions have been answered to her satisfaction       Patient evaluated with Dr Jose E Martínez MD

## 2023-06-20 ENCOUNTER — HOSPITAL ENCOUNTER (EMERGENCY)
Facility: HOSPITAL | Age: 38
Discharge: HOME | End: 2023-06-20
Attending: EMERGENCY MEDICINE | Admitting: EMERGENCY MEDICINE
Payer: COMMERCIAL

## 2023-06-20 VITALS
HEART RATE: 84 BPM | OXYGEN SATURATION: 98 % | RESPIRATION RATE: 18 BRPM | TEMPERATURE: 97.6 F | SYSTOLIC BLOOD PRESSURE: 124 MMHG | DIASTOLIC BLOOD PRESSURE: 66 MMHG

## 2023-06-20 DIAGNOSIS — S51.812A FOREARM LACERATION, LEFT, INITIAL ENCOUNTER: Primary | ICD-10-CM

## 2023-06-20 PROCEDURE — 12001 RPR S/N/AX/GEN/TRNK 2.5CM/<: CPT | Performed by: EMERGENCY MEDICINE

## 2023-06-20 PROCEDURE — 99283 EMERGENCY DEPT VISIT LOW MDM: CPT | Performed by: EMERGENCY MEDICINE

## 2023-06-20 PROCEDURE — 99283 EMERGENCY DEPT VISIT LOW MDM: CPT

## 2023-06-20 NOTE — ED ATTENDING ATTESTATION
Final Diagnoses:     1. Forearm laceration, left, initial encounter           I, Leo Crandall MD, saw and evaluated the patient. All available labs and X-rays were ordered by me or the resident / non-physician and have been reviewed by myself. I discussed the patient with the resident / non-physician and agree with the resident's / non-physician practitioner's findings and plan as documented in the resident's / non-physician practicitioner's note, except where noted. At this point, I agree with the current assessment done in the ED. I was present during key portions of all procedures performed unless otherwise stated. Nursing Triage:     Chief Complaint   Patient presents with   • Extremity Laceration     Patient reports cutting arm with knife. Bleeding controlled with bandaid       HPI:   This is a 40 y.o. female presenting for evaluation of laceration. There's a tiny laceration present on the LEFT volar aspect of foremar, superficiail. She placed a bandaid and came in  Took tylenol/motrin. ASSESSMENT + PLAN:   Laceration:  Local wound care. Tetanus 2021. Physical:   General: VS reviewed  Appears in NAD  awake, alert. Well-nourished, well-developed. Appears stated age. Speaking normally in full sentences. Head: Normocephalic, atraumatic  Eyes: EOM-I. No diplopia. No hyphema. No subconjunctival hemorrhages. Symmetrical lids. ENT: Atraumatic external nose and ears. MMM  No malocclusion. No stridor. Normal phonation. No drooling. Normal swallowing. Neck: No JVD. CV: No pallor noted  Lungs:   No tachypnea  No respiratory distress  MSK:   FROM spontaneously  Skin: Dry, intact. Neuro: Awake, alert, GCS15, CN II-XII grossly intact. Motor grossly intact. Psychiatric/Behavioral: interacting normally; appropriate mood/affect.     Exam: deferred    Vitals:    06/20/23 1903   BP: 124/66   Pulse: 84   Resp: 18   Temp: 97.6 °F (36.4 °C)   SpO2: 98%     - There are no obvious limitations to social determinants of care. - Nursing note reviewed. - Vitals reviewed. - Orders placed by myself and/or advanced practitioner / resident.    - Previous chart was reviewed  - No language barrier.   - History obtained from patient. - There are no limitations to the history obtained:     Past Medical:    has a past medical history of Varicella. Past Surgical:    has a past surgical history that includes  section; pr  delivery only (N/A, 2021); and pr lig/trnsxj falopian tube  del/abdml surg (Bilateral, 2021). Social:     Social History     Substance and Sexual Activity   Alcohol Use Never     Social History     Tobacco Use   Smoking Status Never   Smokeless Tobacco Never     Social History     Substance and Sexual Activity   Drug Use Never       Echo:   No results found for this or any previous visit. No results found for this or any previous visit. Cath:    No results found for this or any previous visit. Code Status: Prior  Advance Directive and Living Will:      Power of :    POLST:    Medications - No data to display  No orders to display     Orders Placed This Encounter   Procedures   • Laceration repair     Labs Reviewed - No data to display  Time reflects when diagnosis was documented in both MDM as applicable and the Disposition within this note     Time User Action Codes Description Comment    2023  8:21 PM Alem Dockery Add [S06.157N] Forearm laceration, left, initial encounter       ED Disposition     ED Disposition   Discharge    Condition   Stable    Date/Time     8:21 PM    Comment   [address] [place] discharge to home/self care.                Follow-up Information    None       Discharge Medication List as of 2023  8:22 PM      CONTINUE these medications which have NOT CHANGED    Details   Ascorbic Acid (vitamin C) 1000 MG tablet Take 1 tablet (1,000 mg total) by mouth 2 (two) times a day, Starting Wed 5/12/2021, Print      cholecalciferol (VITAMIN D3) 1,000 units tablet Take 2 tablets (2,000 Units total) by mouth daily, Starting Wed 5/12/2021, Print      ibuprofen (MOTRIN) 600 mg tablet Take 1 tablet (600 mg total) by mouth every 6 (six) hours as needed for mild pain, Starting Mon 8/16/2021, Normal      Multiple Vitamin (multivitamin) tablet Take 1 tablet by mouth daily, Starting Wed 5/12/2021, Print           No discharge procedures on file. Prior to Admission Medications   Prescriptions Last Dose Informant Patient Reported? Taking? Ascorbic Acid (vitamin C) 1000 MG tablet  Self No No   Sig: Take 1 tablet (1,000 mg total) by mouth 2 (two) times a day   Patient not taking: Reported on 5/9/2023   Multiple Vitamin (multivitamin) tablet  Self No No   Sig: Take 1 tablet by mouth daily   Patient not taking: Reported on 5/9/2023   cholecalciferol (VITAMIN D3) 1,000 units tablet  Self No No   Sig: Take 2 tablets (2,000 Units total) by mouth daily   Patient not taking: Reported on 5/9/2023   ibuprofen (MOTRIN) 600 mg tablet   No No   Sig: Take 1 tablet (600 mg total) by mouth every 6 (six) hours as needed for mild pain      Facility-Administered Medications: None                        Portions of the record may have been created with voice recognition software. Occasional wrong word or "sound a like" substitutions may have occurred due to the inherent limitations of voice recognition software. Read the chart carefully and recognize, using context, where substitutions have occurred.     Electronically signed by:  Davon Estrada

## 2023-06-20 NOTE — ED PROVIDER NOTES
History  Chief Complaint   Patient presents with   • Extremity Laceration     Patient reports cutting arm with knife. Bleeding controlled with bandaid     80-year-old woman with no relevant PMH presents with left forearm laceration. Patient was cutting food with a knife when she accidentally sliced her left forearm. Bleeding is controlled at this time. She denies numbness and tingling of the fingers. She is able to move her left fingers and wrist. She took ibuprofen and acetaminophen at home with improvement of her pain. Last tetanus . Prior to Admission Medications   Prescriptions Last Dose Informant Patient Reported? Taking?    Ascorbic Acid (vitamin C) 1000 MG tablet  Self No No   Sig: Take 1 tablet (1,000 mg total) by mouth 2 (two) times a day   Patient not taking: Reported on 2023   Multiple Vitamin (multivitamin) tablet  Self No No   Sig: Take 1 tablet by mouth daily   Patient not taking: Reported on 2023   cholecalciferol (VITAMIN D3) 1,000 units tablet  Self No No   Sig: Take 2 tablets (2,000 Units total) by mouth daily   Patient not taking: Reported on 2023   ibuprofen (MOTRIN) 600 mg tablet   No No   Sig: Take 1 tablet (600 mg total) by mouth every 6 (six) hours as needed for mild pain      Facility-Administered Medications: None       Past Medical History:   Diagnosis Date   • Varicella     vaccine       Past Surgical History:   Procedure Laterality Date   •  SECTION     • OR  DELIVERY ONLY N/A 2021    Procedure: Guille Hensley () REPEAT;  Surgeon: Cecilia Montgomery MD;  Location: [facility] ;  Service: Obstetrics   • OR LIG/TRNSXJ FALOPIAN TUBE  DEL/ABDML SURG Bilateral 2021    Procedure: LIGATION/COAGULATION TUBAL;  Surgeon: Cecilia Montgomery MD;  Location: [facility] ;  Service: Obstetrics       Family History   Problem Relation Age of Onset   • No Known Problems Mother    • No Known Problems Father    • No Known Problems Sister    • No Known Problems Brother    • No Known Problems Daughter    • No Known Problems Maternal Grandmother    • No Known Problems Maternal Grandfather    • No Known Problems Paternal Grandmother    • No Known Problems Paternal Grandfather    • No Known Problems Sister    • No Known Problems Sister    • No Known Problems Sister    • No Known Problems Sister    • No Known Problems Sister    • No Known Problems Sister    • No Known Problems Sister      I have reviewed and agree with the history as documented. E-Cigarette/Vaping   • E-Cigarette Use Never User      E-Cigarette/Vaping Substances   • Nicotine No    • THC No    • CBD No    • Flavoring No    • Other No    • Unknown No      Social History     Tobacco Use   • Smoking status: Never   • Smokeless tobacco: Never   Vaping Use   • Vaping Use: Never used   Substance Use Topics   • Alcohol use: Never   • Drug use: Never        Review of Systems   Constitutional: Negative for chills and fever. HENT: Negative for ear pain and sore throat. Eyes: Negative for pain and visual disturbance. Respiratory: Negative for cough, shortness of breath and wheezing. Cardiovascular: Negative for chest pain and palpitations. Gastrointestinal: Negative for abdominal pain, constipation, diarrhea and vomiting. Genitourinary: Negative for dysuria, frequency, hematuria and vaginal bleeding. Musculoskeletal: Negative for arthralgias and back pain. Skin: Positive for wound. Negative for color change and rash. Neurological: Negative for seizures, syncope and headaches. Psychiatric/Behavioral: Negative for agitation and confusion.        Physical Exam  ED Triage Vitals [06/20/23 1903]   Temperature Pulse Respirations Blood Pressure SpO2   97.6 °F (36.4 °C) 84 18 124/66 98 %      Temp src Heart Rate Source Patient Position - Orthostatic VS BP Location FiO2 (%)   -- -- -- -- --      Pain Score       --             Orthostatic Vital Signs  Vitals:    06/20/23 1903   BP: 124/66   Pulse: 84 Physical Exam  Vitals and nursing note reviewed. Constitutional:       General: She is not in acute distress. Appearance: Normal appearance. She is well-developed. HENT:      Head: Normocephalic and atraumatic. Right Ear: External ear normal.      Left Ear: External ear normal.   Cardiovascular:      Rate and Rhythm: Normal rate and regular rhythm. Pulmonary:      Effort: Pulmonary effort is normal. No respiratory distress. Musculoskeletal:         General: Normal range of motion. Arms:       Cervical back: Normal range of motion and neck supple. Skin:     General: Skin is warm and dry. Neurological:      Mental Status: She is alert and oriented to person, place, and time. Mental status is at baseline. Psychiatric:         Mood and Affect: Mood normal.         Behavior: Behavior normal.             ED Medications  Medications - No data to display    Diagnostic Studies  Results Reviewed     None                 No orders to display         Procedures  Universal Protocol:  Consent: Verbal consent obtained. Consent given by: patient  Patient identity confirmed: verbally with patient    Laceration repair    Date/Time: 6/20/2023 8:20 PM    Performed by: Martin Quinones MD  Authorized by: Martin Quinones MD  Body area: upper extremity  Location details: left upper arm  Laceration length: 1 cm  Tendon involvement: none  Nerve involvement: none  Vascular damage: no    Wound Dehiscence:  Superficial Wound Dehiscence: simple closure      Procedure Details:  Irrigation solution: tap water  Skin closure: Steri-Strips and glue  Patient tolerance: patient tolerated the procedure well with no immediate complications  Comments: Wound closed with benzoin and 3 steri strips            ED Course       SBIRT 22yo+    Flowsheet Row Most Recent Value   Initial Alcohol Screen: US AUDIT-C     1. How often do you have a drink containing alcohol? 0 Filed at: 06/20/2023 1900   2.  How many drinks containing alcohol do you have on a typical day you are drinking? 0 Filed at: 06/20/2023 1900   3a. Male UNDER 65: How often do you have five or more drinks on one occasion? 0 Filed at: 06/20/2023 1900   3b. FEMALE Any Age, or MALE 65+: How often do you have 4 or more drinks on one occassion? 0 Filed at: 06/20/2023 1900   Audit-C Score 0 Filed at: 06/20/2023 1900   KELLE: How many times in the past year have you. .. Used an illegal drug or used a prescription medication for non-medical reasons? Never Filed at: 06/20/2023 1900          Medical Decision Making  Presents after left forearm laceration. Discussed fully applied benzoin and Steri-Strip with adequate hemostasis. Patient's tetanus is up-to-date. Recommend over-the-counter pain medications. Patient in agreement with plan and questions were answered. Verbalized understanding of return precautions. Portions or all of this note were generated using voice recognition software. Occasional wrong word or "sound a like" substitutions may have occurred due to the inherent limitations of voice recognition software. Please interpret any errors within the intended context of the whole sentence or idea. Disposition  Final diagnoses:   Forearm laceration, left, initial encounter     Time reflects when diagnosis was documented in both MDM as applicable and the Disposition within this note     Time User Action Codes Description Comment    6/20/2023  8:21 PM Katie Gibson Add [L49.335F] Forearm laceration, left, initial encounter       ED Disposition     ED Disposition   Discharge    Condition   Stable    Date/Time   Tue Jun 20, 2023  8:21 PM    Comment   [address] [place] discharge to home/self care.                Follow-up Information    None         Discharge Medication List as of 6/20/2023  8:22 PM      CONTINUE these medications which have NOT CHANGED    Details   Ascorbic Acid (vitamin C) 1000 MG tablet Take 1 tablet (1,000 mg total) by mouth 2 (two) times a day, Starting Wed 5/12/2021, Print      cholecalciferol (VITAMIN D3) 1,000 units tablet Take 2 tablets (2,000 Units total) by mouth daily, Starting Wed 5/12/2021, Print      ibuprofen (MOTRIN) 600 mg tablet Take 1 tablet (600 mg total) by mouth every 6 (six) hours as needed for mild pain, Starting Mon 8/16/2021, Normal      Multiple Vitamin (multivitamin) tablet Take 1 tablet by mouth daily, Starting Wed 5/12/2021, Print           No discharge procedures on file. PDMP Review     None           ED Provider  Attending physically available and evaluated Mary Ellenaura Trimble I managed the patient along with the ED Attending.     Electronically Signed by         Holly Lozano MD  06/21/23 4033

## 2023-06-21 NOTE — DISCHARGE INSTRUCTIONS
Você foi visto por bina laceração do antebraço. sua laceração foi reparada com cola e tiras de zenobia. Evite molhar a [place]. as tiras cairão naturalmente com o tempo. you were seen for a laceration of your forearm. your laceration was repaired with glue and steri-strips. avoid soaking the wound with water. the strips will naturally fall off in time.

## 2023-07-25 ENCOUNTER — OFFICE VISIT (OUTPATIENT)
Dept: DENTISTRY | Facility: CLINIC | Age: 38
End: 2023-07-25

## 2023-07-25 VITALS — DIASTOLIC BLOOD PRESSURE: 87 MMHG | SYSTOLIC BLOOD PRESSURE: 124 MMHG | HEART RATE: 76 BPM

## 2023-07-25 DIAGNOSIS — K05.30 PERIODONTITIS: Primary | ICD-10-CM

## 2023-07-25 PROCEDURE — D4342 PERIODONTAL SCALING AND ROOT PLANING - 1 TO 3 TEETH PER QUADRANT: HCPCS

## 2023-07-25 NOTE — DENTAL PROCEDURE DETAILS
SRP in quads :UL 1-3 teeth. 15 mins late    Speaks [place], used Google translate    Reviewed meds/hhx in [place]  ASA: 1    Anesthesia-  Topical gel benzocaine 20% was applied to tissue. Max L buccal infiltrations, short, 3/4 carp 4% Septo w/ epi 1:100,000  Neg aspirations and no complications for all. Cavitron and hand instruments used  Bleeding: heavy  Supra/sub calculus was removed from tooth surfaces  Irrigated w/ .12% Chlorhexidine Gluconate    OHI: reviewed with the patient the need to maintain proper oral hygiene regimen at home. Brushing 2x/day for 2 minutes, flossing subgingivally daily and mouthrinse 1-2x/day for 60 seconds. Showed patient how to floss properly subgingivally while they watched in the mirror. Recommend otc pain reliever when she gets home prior to local anesthesia wearing off. Recommend warm salt water rinses. Recommend excellent homecare regimen especially on   UR and UL   side post SRP. Advised patient periodontal disease is an oral disease we can treat and maintain but cannot cure. Without proper dental visits and proper at home dental care this disease may return. Patient understands. Following scaling and root planing, a 4-6 week Perio Re-eval is recommended, to evaluate whether patient will need further periodontal surgery. Recommend maintaining 3/4 month recalls.     NV: LR and LL 1-3 teeth SRP

## 2023-11-01 ENCOUNTER — OFFICE VISIT (OUTPATIENT)
Dept: DENTISTRY | Facility: CLINIC | Age: 38
End: 2023-11-01

## 2023-11-01 VITALS — DIASTOLIC BLOOD PRESSURE: 76 MMHG | HEART RATE: 83 BPM | SYSTOLIC BLOOD PRESSURE: 113 MMHG

## 2023-11-01 DIAGNOSIS — K05.30 PERIODONTITIS: Primary | ICD-10-CM

## 2023-11-01 PROCEDURE — D4342 PERIODONTAL SCALING AND ROOT PLANING - 1 TO 3 TEETH PER QUADRANT: HCPCS

## 2023-12-11 ENCOUNTER — OFFICE VISIT (OUTPATIENT)
Dept: DENTISTRY | Facility: CLINIC | Age: 38
End: 2023-12-11

## 2023-12-11 DIAGNOSIS — Z01.21 ENCOUNTER FOR DENTAL EXAMINATION AND CLEANING WITH ABNORMAL FINDINGS: Primary | ICD-10-CM

## 2023-12-11 PROCEDURE — D0191 ASSESSMENT OF A PATIENT: HCPCS

## 2023-12-12 NOTE — DENTAL PROCEDURE DETAILS
Tx Planning Session    Keli Interpretor used-    Findings:  #4 DO caries   #7 MFL defective restoration   #9 has previously RCT with large composite restoration with significant facial overhang -> needs post + core + crown   #13 previous RCT with large core build up and recurrent decay on the mesial.    -> needs post+ core + crown   #14 MO   #30 DO. Vitality Tested WNL. Patient aware may become endo but unlikely at this point  #32 MO   Due to mesialization of #2, #18 and #32, edentulous spcaes need to be re-evaluated to discuss options to replace them. Patient wishes to focus on caries control first. Due to occlusion, casts should be mounted prior to final crown restorations.     Attending: Dr Vidya Menezes    NV: #30 DO comp (may turn into endo)  NNV: #13 caries removal and post and core

## 2024-05-31 ENCOUNTER — OFFICE VISIT (OUTPATIENT)
Dept: DENTISTRY | Facility: CLINIC | Age: 39
End: 2024-05-31

## 2024-05-31 VITALS — DIASTOLIC BLOOD PRESSURE: 72 MMHG | SYSTOLIC BLOOD PRESSURE: 109 MMHG | HEART RATE: 68 BPM

## 2024-05-31 DIAGNOSIS — K02.9 TOOTH DECAY: Primary | ICD-10-CM

## 2024-05-31 PROCEDURE — D2392 RESIN-BASED COMPOSITE - 2 SURFACES, POSTERIOR: HCPCS

## 2024-05-31 NOTE — PROGRESS NOTES
Composite Filling    Lety Rouseava Mackeida presents for composite filling. PMH reviewed, no changes. ASA2    Discussed with patient need for RCT if pulp exposure occurs or in future if pulp is inflamed. Pt understands and consents.    Applied topical benzocaine, administered 1 carps 4% articaine 1:100k epi via maxillary infiltration    Prepped tooth #4DO with 245 carbide on high speed. Caries removed with round carbide on slow speed. Placed tofflemire/palodent matrix. Isolation with cotton rolls and dri-angles    Etch with 37% H2PO4, rinse, dry. Applied Adhese with 20 second scrub once, gentle air dry and light cured for 10s. Restored with flowable shade A2 and light cured.    Refined with finishing burs, polished with enhance point. Verified occlusion and contacts. Pt left satisfied.    NV: #30DO (deep decay)

## 2024-08-06 ENCOUNTER — OFFICE VISIT (OUTPATIENT)
Dept: DENTISTRY | Facility: CLINIC | Age: 39
End: 2024-08-06

## 2024-08-06 VITALS — HEART RATE: 70 BPM | DIASTOLIC BLOOD PRESSURE: 78 MMHG | SYSTOLIC BLOOD PRESSURE: 112 MMHG

## 2024-08-06 DIAGNOSIS — K02.9 RECURRENT DENTAL CARIES EXTENDING INTO DENTIN: Primary | ICD-10-CM

## 2024-08-06 PROCEDURE — D2392 RESIN-BASED COMPOSITE - 2 SURFACES, POSTERIOR: HCPCS

## 2024-08-06 PROCEDURE — D2940 PROTECTIVE RESTORATION: HCPCS

## 2024-08-06 PROCEDURE — D3110 PULP CAP - DIRECT (EXCLUDING FINAL RESTORATION): HCPCS

## 2024-08-06 RX ORDER — AMOXICILLIN 500 MG/1
500 CAPSULE ORAL EVERY 8 HOURS SCHEDULED
Qty: 21 CAPSULE | Refills: 0 | Status: SHIPPED | OUTPATIENT
Start: 2024-08-06 | End: 2024-08-13

## 2024-08-06 NOTE — DENTAL PROCEDURE DETAILS
.Glass Ionomer #32 MO and #30 DO     Lety Castillo 38 y.o. female presents with daughter to [facility] for composite restoration  PMH reviewed, no changes, ASA II. Significant medical history: none. Significant allergies: none. Significant medications: none.    Patient presents for #30 DO resin  New PA and Bitewing taken  Findings: subgingival calculus, Recurrent decay #30 close to the pulp- apex is WNL, patient is not experiencing any symptoms, Discussed with patient that the decay is close to the nerve of the tooth and I may expose the nerve while removing all of the decay. I told patient if that occurs, I will need to most likely do a root canal, post and core and crown on that tooth. The other option would be extraction of that tooth. Patient understood information presented  tooth #32 is tilted mesially with decay on the mesial. Discussed with patient that the tooth may be hard to restore    Diagnosis:  Caries #30 DO recurrent deacay, #32 MO    Prognosis:  guarded    Consent:  Risks of specific procedure: need for RCT if pulp exposure occurs or in future if pulp is inflamed, need to revise tx plan based on extent of decay, damage to adjacent tooth and/or restoration.  Risks of any dental procedure: post procedural pain or sensitivity, local anesthetic side effects, allergic reaction to dental materials and medications, breakage of local anesthetic needle, aspiration of small dental tools, injury to nearby hard and soft tissues and anatomical structures.  Benefits: prevent further breakdown of tooth and its sequelae,  Alternatives: extraction, no tx.  Tx plan for composite restoration #30 DO, #32 MO reviewed. Opportunity to ask questions given, all questions answered to degree of medical and dental certainty.  Patient understands and consent given by self via verbal consent.    Anesthesia:  Topical 20% benzocaine.  1 carps 2% Lidocaine 1:100k epi via NADIA block.  1 carps 4% Septocaine 1:100k epi via  buccal infiltration.    Procedure details:  Isolation: cotton rolls, dry angles, and high volume suction  Prepped teeth #30 and 32 with high speed handpiece.  Caries removed with round carbide on slow speed.  #30 1.5 mm pulp exposure- minimal heme  -Place sodium hypochlorite with cotton pellet over exposure  - Placed Dycal over exposure  -Restored with Ionostar plus Glass Ionomer  Band placement: Tofflemire matrix.   Restored with  Ionostar plus Glass Ionomer  (A2 shade)   Checked occlusion and adjusted with finishing burs.  Checked contacts with floss  Verified occlusion and contacts.    Informed patient about the exposure and told her that she should call if she is experiencing any symptoms and that we put a temporary material in the tooth for now. I will keep monitoring tooth for about a year. If no symptoms after 1 year, leave about 1mm Glass Ionomer and restore with composite resin. If symptoms occur within a year, root canal treatment, post and core and crown. It would be recommended to extract tooth #32 if crown is needed on tooth #30 due to tilt of #32    Prescribed patient Amoxicillin     Patient dismissed ambulatory and alert.    NV: #7 MFL, #14 MO resins. Make sure patient schedules for perio maintenance visit at next apt. Follow up with patient if symptomatic #30    Attending: Dr. Huang examined pt.

## 2024-09-30 ENCOUNTER — OFFICE VISIT (OUTPATIENT)
Dept: DENTISTRY | Facility: CLINIC | Age: 39
End: 2024-09-30

## 2024-09-30 VITALS — DIASTOLIC BLOOD PRESSURE: 77 MMHG | SYSTOLIC BLOOD PRESSURE: 127 MMHG | HEART RATE: 78 BPM

## 2024-09-30 DIAGNOSIS — K02.9 CARIES: Primary | ICD-10-CM

## 2024-09-30 PROCEDURE — D2392 RESIN-BASED COMPOSITE - 2 SURFACES, POSTERIOR: HCPCS

## 2024-09-30 NOTE — DENTAL PROCEDURE DETAILS
Patient presents for a dental restoration and verbally consents for treatment:  Reviewed health history-  Pt is ASA type II  Treatment consents signed: Yes  Perio: Gingivitis  Pain Scale: 2  Caries Assessment: Medium    Radiographs: Films are current  Oral Hygiene instruction reviewed and given  Hygiene recall visits recommended to the patient    Patient agrees with the diagnosis of Caries and the proposed treatment plan for the resin restoration:  Tooth ##14  Dental Anesthesia:  1.7 ml 4% septocaine 1:100,000 epi.  Material:   Etch Ivoclar bond and resin   Shade: Shade A2    Prognosis is Good.   Referrals Needed: No  Next visit: Periodic Exam

## 2024-10-15 ENCOUNTER — OFFICE VISIT (OUTPATIENT)
Dept: DENTISTRY | Facility: CLINIC | Age: 39
End: 2024-10-15

## 2024-10-15 DIAGNOSIS — K01.1 IMPACTED THIRD MOLAR TOOTH: Primary | ICD-10-CM

## 2024-10-15 PROCEDURE — D0220 INTRAORAL - PERIAPICAL FIRST RADIOGRAPHIC IMAGE: HCPCS

## 2024-10-15 PROCEDURE — D0140 LIMITED ORAL EVALUATION - PROBLEM FOCUSED: HCPCS

## 2024-10-15 NOTE — DENTAL PROCEDURE DETAILS
Limited Exam    Lety Castillo 38 y.o. female presents with self to [facility] for Limited exam  PMH reviewed, no changes, ASA II.     Chief complaint:  My bottom right teeth are starting to hurt again    Consent:  Discussed that limited exam focuses on problem area, and same day tx is not guaranteed.  Patient explained to if they wish to have anything else evaluated, they need to return to the practice at which they are a patient of record or schedule a comprehensive exam afterwards.  Patient understands and consent was given by self via verbal consent.    Subjective history:    Onset: doesn't remember exactly but recent   Provocation: Cold, Hot, Biting.   Quality: Dull.   Region: LR.   Severity: 4/10.   Timing: only when provoked.    Pt reports no taking OTC medication to deal with mild pain.     Objective clinical findings:   Oral cancer screening: normal.   Extraoral exam: no remarkable findings.  Intraoral exam:  existing large restoration that required direct pulp cap .     Radiographs: 1 BW and 1 PA of #30   Findings: mesially-tilted #32, large restoration w/ direct pulp cap on #30     Pulp testing:  #30 Cold: Exaggerated but nonlingering response; Percussion: Mild tenderness; Palpation: Normal.    Assessment:  Symptomatic #30 after pulpal exposure    Plan:   #30 RCT --> post/core --> crown  #32 EXT so that crown may be fabricated    Take OTC medication and be cognizant of chewing on right side and see if symptoms improve.    Referral(s): [facility] for #32 EXT .  Rx: None.      #30 GI restoration adjusted with #2 round bur on slow speed to minimize amount of occlusion. Pt reports already feeling some improvement.    Patient dismissed ambulatory and alert.    NV: #30 RCT after pt gets #32 ext by [facility].    Attending: Dr. Christian and Dr. Huang was present in clinic.

## 2024-10-23 ENCOUNTER — TELEPHONE (OUTPATIENT)
Dept: DENTISTRY | Facility: CLINIC | Age: 39
End: 2024-10-23

## 2024-10-25 ENCOUNTER — APPOINTMENT (EMERGENCY)
Dept: RADIOLOGY | Facility: HOSPITAL | Age: 39
End: 2024-10-25
Payer: COMMERCIAL

## 2024-10-25 ENCOUNTER — HOSPITAL ENCOUNTER (EMERGENCY)
Facility: HOSPITAL | Age: 39
Discharge: HOME | End: 2024-10-25
Attending: EMERGENCY MEDICINE
Payer: COMMERCIAL

## 2024-10-25 VITALS
SYSTOLIC BLOOD PRESSURE: 105 MMHG | OXYGEN SATURATION: 98 % | RESPIRATION RATE: 16 BRPM | DIASTOLIC BLOOD PRESSURE: 55 MMHG | TEMPERATURE: 98.9 F | HEART RATE: 100 BPM

## 2024-10-25 DIAGNOSIS — N12 PYELONEPHRITIS: Primary | ICD-10-CM

## 2024-10-25 DIAGNOSIS — K59.00 CONSTIPATION: ICD-10-CM

## 2024-10-25 LAB
ALBUMIN SERPL BCG-MCNC: 4 G/DL (ref 3.5–5)
ALP SERPL-CCNC: 40 U/L (ref 34–104)
ALT SERPL W P-5'-P-CCNC: 18 U/L (ref 7–52)
ANION GAP SERPL CALCULATED.3IONS-SCNC: 6 MMOL/L (ref 4–13)
AST SERPL W P-5'-P-CCNC: 18 U/L (ref 13–39)
ATRIAL RATE: 113 BPM
BACTERIA UR QL AUTO: ABNORMAL /HPF
BASOPHILS # BLD AUTO: 0.02 THOUSANDS/ΜL (ref 0–0.1)
BASOPHILS NFR BLD AUTO: 0 % (ref 0–1)
BILIRUB SERPL-MCNC: 0.3 MG/DL (ref 0.2–1)
BILIRUB UR QL STRIP: NEGATIVE
BUN SERPL-MCNC: 14 MG/DL (ref 5–25)
CALCIUM SERPL-MCNC: 8.9 MG/DL (ref 8.4–10.2)
CHLORIDE SERPL-SCNC: 104 MMOL/L (ref 96–108)
CLARITY UR: CLEAR
CO2 SERPL-SCNC: 24 MMOL/L (ref 21–32)
COLOR UR: COLORLESS
CREAT SERPL-MCNC: 0.89 MG/DL (ref 0.6–1.3)
EOSINOPHIL # BLD AUTO: 0.04 THOUSAND/ΜL (ref 0–0.61)
EOSINOPHIL NFR BLD AUTO: 0 % (ref 0–6)
ERYTHROCYTE [DISTWIDTH] IN BLOOD BY AUTOMATED COUNT: 14.9 % (ref 11.6–15.1)
EXT PREGNANCY TEST URINE: NEGATIVE
EXT. CONTROL: NORMAL
GFR SERPL CREATININE-BSD FRML MDRD: 83 ML/MIN/1.73SQ M
GLUCOSE SERPL-MCNC: 105 MG/DL (ref 65–140)
GLUCOSE UR STRIP-MCNC: NEGATIVE MG/DL
HCT VFR BLD AUTO: 35 % (ref 34.8–46.1)
HGB BLD-MCNC: 11.5 G/DL (ref 11.5–15.4)
HGB UR QL STRIP.AUTO: ABNORMAL
IMM GRANULOCYTES # BLD AUTO: 0.03 THOUSAND/UL (ref 0–0.2)
IMM GRANULOCYTES NFR BLD AUTO: 0 % (ref 0–2)
KETONES UR STRIP-MCNC: NEGATIVE MG/DL
LEUKOCYTE ESTERASE UR QL STRIP: NEGATIVE
LIPASE SERPL-CCNC: 36 U/L (ref 11–82)
LYMPHOCYTES # BLD AUTO: 1.92 THOUSANDS/ΜL (ref 0.6–4.47)
LYMPHOCYTES NFR BLD AUTO: 17 % (ref 14–44)
MCH RBC QN AUTO: 26.9 PG (ref 26.8–34.3)
MCHC RBC AUTO-ENTMCNC: 32.9 G/DL (ref 31.4–37.4)
MCV RBC AUTO: 82 FL (ref 82–98)
MONOCYTES # BLD AUTO: 1.19 THOUSAND/ΜL (ref 0.17–1.22)
MONOCYTES NFR BLD AUTO: 11 % (ref 4–12)
NEUTROPHILS # BLD AUTO: 7.87 THOUSANDS/ΜL (ref 1.85–7.62)
NEUTS SEG NFR BLD AUTO: 72 % (ref 43–75)
NITRITE UR QL STRIP: NEGATIVE
NON-SQ EPI CELLS URNS QL MICRO: ABNORMAL /HPF
NRBC BLD AUTO-RTO: 0 /100 WBCS
P AXIS: 52 DEGREES
PH UR STRIP.AUTO: 7.5 [PH]
PLATELET # BLD AUTO: 219 THOUSANDS/UL (ref 149–390)
PMV BLD AUTO: 10.2 FL (ref 8.9–12.7)
POTASSIUM SERPL-SCNC: 4.2 MMOL/L (ref 3.5–5.3)
PR INTERVAL: 146 MS
PROT SERPL-MCNC: 7.1 G/DL (ref 6.4–8.4)
PROT UR STRIP-MCNC: NEGATIVE MG/DL
QRS AXIS: 77 DEGREES
QRSD INTERVAL: 84 MS
QT INTERVAL: 310 MS
QTC INTERVAL: 425 MS
RBC # BLD AUTO: 4.28 MILLION/UL (ref 3.81–5.12)
RBC #/AREA URNS AUTO: ABNORMAL /HPF
SODIUM SERPL-SCNC: 134 MMOL/L (ref 135–147)
SP GR UR STRIP.AUTO: 1.01 (ref 1–1.03)
T WAVE AXIS: 8 DEGREES
UROBILINOGEN UR STRIP-ACNC: <2 MG/DL
VENTRICULAR RATE: 113 BPM
WBC # BLD AUTO: 11.07 THOUSAND/UL (ref 4.31–10.16)
WBC #/AREA URNS AUTO: ABNORMAL /HPF

## 2024-10-25 PROCEDURE — 99285 EMERGENCY DEPT VISIT HI MDM: CPT

## 2024-10-25 PROCEDURE — 80053 COMPREHEN METABOLIC PANEL: CPT

## 2024-10-25 PROCEDURE — 85025 COMPLETE CBC W/AUTO DIFF WBC: CPT

## 2024-10-25 PROCEDURE — 96361 HYDRATE IV INFUSION ADD-ON: CPT

## 2024-10-25 PROCEDURE — 36415 COLL VENOUS BLD VENIPUNCTURE: CPT

## 2024-10-25 PROCEDURE — 74177 CT ABD & PELVIS W/CONTRAST: CPT

## 2024-10-25 PROCEDURE — 81001 URINALYSIS AUTO W/SCOPE: CPT

## 2024-10-25 PROCEDURE — 81025 URINE PREGNANCY TEST: CPT

## 2024-10-25 PROCEDURE — 96365 THER/PROPH/DIAG IV INF INIT: CPT

## 2024-10-25 PROCEDURE — 93010 ELECTROCARDIOGRAM REPORT: CPT | Performed by: INTERNAL MEDICINE

## 2024-10-25 PROCEDURE — 71046 X-RAY EXAM CHEST 2 VIEWS: CPT

## 2024-10-25 PROCEDURE — 93005 ELECTROCARDIOGRAM TRACING: CPT

## 2024-10-25 PROCEDURE — 83690 ASSAY OF LIPASE: CPT

## 2024-10-25 PROCEDURE — 96375 TX/PRO/DX INJ NEW DRUG ADDON: CPT

## 2024-10-25 PROCEDURE — 99285 EMERGENCY DEPT VISIT HI MDM: CPT | Performed by: EMERGENCY MEDICINE

## 2024-10-25 RX ORDER — SULFAMETHOXAZOLE AND TRIMETHOPRIM 800; 160 MG/1; MG/1
1 TABLET ORAL 2 TIMES DAILY
Qty: 14 TABLET | Refills: 0 | Status: SHIPPED | OUTPATIENT
Start: 2024-10-25 | End: 2024-11-01

## 2024-10-25 RX ORDER — POLYETHYLENE GLYCOL 3350 17 G/17G
POWDER, FOR SOLUTION ORAL
Qty: 527 G | Refills: 0 | Status: SHIPPED | OUTPATIENT
Start: 2024-10-25 | End: 2024-11-19

## 2024-10-25 RX ORDER — ONDANSETRON 2 MG/ML
4 INJECTION INTRAMUSCULAR; INTRAVENOUS ONCE
Status: COMPLETED | OUTPATIENT
Start: 2024-10-25 | End: 2024-10-25

## 2024-10-25 RX ORDER — KETOROLAC TROMETHAMINE 30 MG/ML
15 INJECTION, SOLUTION INTRAMUSCULAR; INTRAVENOUS ONCE
Status: COMPLETED | OUTPATIENT
Start: 2024-10-25 | End: 2024-10-25

## 2024-10-25 RX ADMIN — IOHEXOL 85 ML: 350 INJECTION, SOLUTION INTRAVENOUS at 06:15

## 2024-10-25 RX ADMIN — SODIUM CHLORIDE 1000 ML: 0.9 INJECTION, SOLUTION INTRAVENOUS at 05:17

## 2024-10-25 RX ADMIN — ONDANSETRON 4 MG: 2 INJECTION INTRAMUSCULAR; INTRAVENOUS at 05:20

## 2024-10-25 RX ADMIN — CEFTRIAXONE SODIUM 1000 MG: 10 INJECTION, POWDER, FOR SOLUTION INTRAVENOUS at 07:06

## 2024-10-25 RX ADMIN — KETOROLAC TROMETHAMINE 15 MG: 30 INJECTION, SOLUTION INTRAMUSCULAR at 05:17

## 2024-10-25 NOTE — ED PROVIDER NOTES
Time reflects when diagnosis was documented in both MDM as applicable and the Disposition within this note       Time User Action Codes Description Comment    10/25/2024  6:45 AM Andrea Bowen Add [N12] Pyelonephritis     10/25/2024  6:48 AM Rudy Jade Add [K59.00] Constipation           ED Disposition       ED Disposition   Discharge    Condition   Stable    Date/Time   Fri Oct 25, 2024  6:45 AM    Comment   Lety Castillo discharge to home/self care.                   Assessment & Plan       Medical Decision Making  38-year-old female with sudden onset intractable left-sided flank pain.  Patient tachycardic with otherwise normal vital signs and presentation.  Patient is in acute distress writhing on the bed in pain but not ill-appearing.  Mucous membranes moist.  Heart sounds normal with regular noon.  Lungs clear to auscultation.  Abdomen with mild left upper quadrant tenderness but otherwise soft and nondistended no rebound or guarding.  There is significant left CVA tenderness.  Distal pulses are equal and intact.  There is no lower extremity edema.  Concern for pyelonephritis, ureterolithiasis, pancreatitis, cholecystitis, choledocholithiasis.  I will get abdominal labs and CT and urine and treat symptomatically.    Workup consistent with left pyelonephritis.  I discussed plan for first dose of antibiotics here with IV ceftriaxone and continued outpatient oral antibiotics and primary care follow-up with continued supportive treatment.  Patient is feeling much better after symptomatic treatment in the ER. Patient voiced understanding of the plan and all questions were answered. Strict return precautions given. Patient is hemodynamically stable and safe for discharge at this time.    Amount and/or Complexity of Data Reviewed  Labs: ordered. Decision-making details documented in ED Course.  Radiology: ordered and independent interpretation performed.    Risk  Prescription drug  management.        ED Course as of 10/26/24 0332   Fri Oct 25, 2024   0605 CBC and differential(!)  Borderline leukocytosis with normal differential.  No anemia or thrombocytopenia.   06 CMP(!)  Borderline hyponatremia with otherwise normal electrolytes.  Mildly decreased GFR though no recent baseline to compare.   606 LIPASE: 36   606 PREGNANCY TEST URINE: Negative  Negative       Medications   ketorolac (TORADOL) injection 15 mg (15 mg Intravenous Given 10/25/24 0517)   sodium chloride 0.9 % bolus 1,000 mL (0 mL Intravenous Stopped 10/25/24 0617)   ondansetron (ZOFRAN) injection 4 mg (4 mg Intravenous Given 10/25/24 05)   iohexol (OMNIPAQUE) 350 MG/ML injection (SINGLE-DOSE) 85 mL (85 mL Intravenous Given 10/25/24 0615)   ceftriaxone (ROCEPHIN) 1 g/50 mL in dextrose IVPB (0 mg Intravenous Stopped 10/25/24 0729)       ED Risk Strat Scores                                               History of Present Illness       Chief Complaint   Patient presents with    Chest Pain     Pt reports chest pain, head pain, and back pain for the last few days. Reports it hurts when she takes a deep breath        Past Medical History:   Diagnosis Date    Varicella     vaccine      Past Surgical History:   Procedure Laterality Date     SECTION      [facility]  DELIVERY ONLY N/A 2021    Procedure:  SECTION () REPEAT;  Surgeon: Amber Shearer MD;  Location: [facility] ;  Service: Obstetrics    [facility] LIG/TRNSXJ FALOPIAN TUBE  DEL/ABDML SURG Bilateral 2021    Procedure: LIGATION/COAGULATION TUBAL;  Surgeon: Amber Shearer MD;  Location: [facility] ;  Service: Obstetrics      Family History   Problem Relation Age of Onset    No Known Problems Mother     No Known Problems Father     No Known Problems Sister     No Known Problems Brother     No Known Problems Daughter     No Known Problems Maternal Grandmother     No Known Problems Maternal Grandfather     No Known Problems Paternal Grandmother     No Known  Problems Paternal Grandfather     No Known Problems Sister     No Known Problems Sister     No Known Problems Sister     No Known Problems Sister     No Known Problems Sister     No Known Problems Sister     No Known Problems Sister       Social History     Tobacco Use    Smoking status: Never    Smokeless tobacco: Never   Vaping Use    Vaping status: Never Used   Substance Use Topics    Alcohol use: Never    Drug use: Never      E-Cigarette/Vaping    E-Cigarette Use Never User       E-Cigarette/Vaping Substances    Nicotine No     THC No     CBD No     Flavoring No     Other No     Unknown No       I have reviewed and agree with the history as documented.     HPI  38-year-old female presents to the ED with sudden onset left-sided lower chest pain, left flank pain, abdominal pain starting last night.  She does state that she was recently diagnosed with a UTI and  does show me the medication she was prescribed in it.  She was prescribed an NSAID as well as Pyridium and methenamine.  She states that her dysuria resolved, but then the symptoms started yesterday and were abrupt onset.  She has no recent airline travel or surgeries and no history of cancer or DVT or PE.  She has no history of kidney stones.  She does endorse nausea on review of systems.  Denies fevers, chills, cough, hemoptysis, vomiting, diarrhea, decreased urine output, hematuria.  Review of Systems  See HPI      Objective       ED Triage Vitals   Temperature Pulse Blood Pressure Respirations SpO2 Patient Position - Orthostatic VS   10/25/24 0443 10/25/24 0443 10/25/24 0443 10/25/24 0443 10/25/24 0443 10/25/24 0443   98.9 °F (37.2 °C) (!) 112 148/72 22 99 % Sitting      Temp Source Heart Rate Source BP Location FiO2 (%) Pain Score    10/25/24 0443 10/25/24 0443 10/25/24 0443 -- 10/25/24 0517    Axillary Monitor Left arm  10 - Worst Possible Pain      Vitals      Date and Time Temp Pulse SpO2 Resp BP Pain Score FACES Pain Rating User   10/25/24  0707 -- 100 98 % 16 105/55 5 -- HB   10/25/24 0517 -- -- -- -- -- 10 - Worst Possible Pain -- KG   10/25/24 0443 98.9 °F (37.2 °C) 112 99 % 22 148/72 -- --             Physical Exam  Constitutional:       General: She is in acute distress.      Appearance: Normal appearance. She is not ill-appearing.   HENT:      Head: Normocephalic and atraumatic.      Mouth/Throat:      Mouth: Mucous membranes are moist.      Pharynx: Oropharynx is clear.   Eyes:      Extraocular Movements: Extraocular movements intact.      Conjunctiva/sclera: Conjunctivae normal.   Cardiovascular:      Rate and Rhythm: Normal rate and regular rhythm.      Pulses: Normal pulses.      Heart sounds: Normal heart sounds.   Pulmonary:      Effort: Pulmonary effort is normal.      Breath sounds: Normal breath sounds.   Abdominal:      General: There is no distension.      Palpations: Abdomen is soft.      Tenderness: There is abdominal tenderness (Left upper quadrant). There is left CVA tenderness.   Musculoskeletal:      Cervical back: Normal range of motion and neck supple.      Right lower leg: No edema.      Left lower leg: No edema.   Skin:     General: Skin is warm and dry.      Capillary Refill: Capillary refill takes less than 2 seconds.   Neurological:      General: No focal deficit present.      Mental Status: She is alert and oriented to person, place, and time.   Psychiatric:         Mood and Affect: Mood normal.         Behavior: Behavior normal.         Thought Content: Thought content normal.         Results Reviewed       Procedure Component Value Units Date/Time    Urine Microscopic [[id number]]  (Abnormal) Collected: 10/25/24 0555    Lab Status: Final result Specimen: Urine, Clean Catch Updated: 10/25/24 0748     RBC, UA 1-2 /hpf      WBC, UA 4-10 /hpf      Epithelial Cells Occasional /hpf      Bacteria, UA Moderate /hpf     UA w Reflex to Microscopic w Reflex to Culture [[id number]]  (Abnormal) Collected: 10/25/24 0555    Lab  Status: Final result Specimen: Urine, Clean Catch Updated: 10/25/24 0619     Color, UA Colorless     Clarity, UA Clear     Specific Gravity, UA 1.008     pH, UA 7.5     Leukocytes, UA Negative     Nitrite, UA Negative     Protein, UA Negative mg/dl      Glucose, UA Negative mg/dl      Ketones, UA Negative mg/dl      Urobilinogen, UA <2.0 mg/dl      Bilirubin, UA Negative     Occult Blood, UA Trace    POCT pregnancy, urine [[id number]]  (Normal) Resulted: 10/25/24 0557    Lab Status: Final result Updated: 10/25/24 0557     EXT Preg Test, Ur Negative     Control Valid    CMP [[id number]]  (Abnormal) Collected: 10/25/24 0516    Lab Status: Final result Specimen: Blood from Arm, Right Updated: 10/25/24 0555     Sodium 134 mmol/L      Potassium 4.2 mmol/L      Chloride 104 mmol/L      CO2 24 mmol/L      ANION GAP 6 mmol/L      BUN 14 mg/dL      Creatinine 0.89 mg/dL      Glucose 105 mg/dL      Calcium 8.9 mg/dL      AST 18 U/L      ALT 18 U/L      Alkaline Phosphatase 40 U/L      Total Protein 7.1 g/dL      Albumin 4.0 g/dL      Total Bilirubin 0.30 mg/dL      eGFR 83 ml/min/1.73sq m     Narrative:      [employer] guidelines for Chronic Kidney Disease (CKD):     Stage 1 with normal or high GFR (GFR > 90 mL/min/1.73 square meters)    Stage 2 Mild CKD (GFR = 60-89 mL/min/1.73 square meters)    Stage 3A Moderate CKD (GFR = 45-59 mL/min/1.73 square meters)    Stage 3B Moderate CKD (GFR = 30-44 mL/min/1.73 square meters)    Stage 4 Severe CKD (GFR = 15-29 mL/min/1.73 square meters)    Stage 5 End Stage CKD (GFR <15 mL/min/1.73 square meters)  Note: GFR calculation is accurate only with a steady state creatinine    Lipase [[id number]]  (Normal) Collected: 10/25/24 0516    Lab Status: Final result Specimen: Blood from Arm, Right Updated: 10/25/24 0555     Lipase 36 u/L     CBC and differential [[id number]]  (Abnormal) Collected: 10/25/24 0516    Lab Status: Final result Specimen: Blood from Arm, Right  Updated: 10/25/24 0531     WBC 11.07 Thousand/uL      RBC 4.28 Million/uL      Hemoglobin 11.5 g/dL      Hematocrit 35.0 %      MCV 82 fL      MCH 26.9 pg      MCHC 32.9 g/dL      RDW 14.9 %      MPV 10.2 fL      Platelets 219 Thousands/uL      nRBC 0 /100 WBCs      Segmented % 72 %      Immature Grans % 0 %      Lymphocytes % 17 %      Monocytes % 11 %      Eosinophils Relative 0 %      Basophils Relative 0 %      Absolute Neutrophils 7.87 Thousands/µL      Absolute Immature Grans 0.03 Thousand/uL      Absolute Lymphocytes 1.92 Thousands/µL      Absolute Monocytes 1.19 Thousand/µL      Eosinophils Absolute 0.04 Thousand/µL      Basophils Absolute 0.02 Thousands/µL             XR chest 2 views   ED Interpretation by Rudy Jade DO (10/25 0654)   No acute findings.      Final Interpretation by Patrice Conteh DO (10/25 0702)      No acute cardiopulmonary disease is seen.            Workstation performed: [id number]         CT Abdomen pelvis with contrast   Final Interpretation by Michael Arango MD (10/25 0634)      Findings suspicious for early left pyelonephritis. No perinephric abscess.      Bilateral nonobstructing nephrolithiasis. No hydronephrosis.      The study was marked in EPIC for immediate notification.         Workstation performed: [id number]             Procedures    ED Medication and Procedure Management   Prior to Admission Medications   Prescriptions Last Dose Informant Patient Reported? Taking?   Ascorbic Acid (vitamin C) 1000 MG tablet  Self No No   Sig: Take 1 tablet (1,000 mg total) by mouth 2 (two) times a day   Patient not taking: Reported on 5/9/2023   Multiple Vitamin (multivitamin) tablet  Self No No   Sig: Take 1 tablet by mouth daily   Patient not taking: Reported on 5/9/2023   cholecalciferol (VITAMIN D3) 1,000 units tablet  Self No No   Sig: Take 2 tablets (2,000 Units total) by mouth daily   Patient not taking: Reported on 5/9/2023   ibuprofen (MOTRIN) 600 mg tablet    No No   Sig: Take 1 tablet (600 mg total) by mouth every 6 (six) hours as needed for mild pain      Facility-Administered Medications: None     Discharge Medication List as of 10/25/2024  6:52 AM        START taking these medications    Details   polyethylene glycol (GLYCOLAX) 17 GM/SCOOP powder Multiple Dosages:Starting Fri 10/25/2024, Until Sun 10/27/2024 at 2359, THEN Starting Mon 10/28/2024, Until Mon 11/18/2024 at 2359Take 17 g by mouth 3 (three) times a day for 3 days, THEN 17 g daily for 22 days., Normal      sulfamethoxazole-trimethoprim (BACTRIM DS) 800-160 mg per tablet Take 1 tablet by mouth 2 (two) times a day for 7 days smx-tmp DS (BACTRIM) 800-160 mg tabs (1tab q12 D10), Starting Fri 10/25/2024, Until Fri 11/1/2024, Normal           CONTINUE these medications which have NOT CHANGED    Details   Ascorbic Acid (vitamin C) 1000 MG tablet Take 1 tablet (1,000 mg total) by mouth 2 (two) times a day, Starting Wed 5/12/2021, Print      cholecalciferol (VITAMIN D3) 1,000 units tablet Take 2 tablets (2,000 Units total) by mouth daily, Starting Wed 5/12/2021, Print      ibuprofen (MOTRIN) 600 mg tablet Take 1 tablet (600 mg total) by mouth every 6 (six) hours as needed for mild pain, Starting Mon 8/16/2021, Normal      Multiple Vitamin (multivitamin) tablet Take 1 tablet by mouth daily, Starting Wed 5/12/2021, Print           No discharge procedures on file.  ED SEPSIS DOCUMENTATION   Time reflects when diagnosis was documented in both MDM as applicable and the Disposition within this note       Time User Action Codes Description Comment    10/25/2024  6:45 AM Andrea Bowen Add [N12] Pyelonephritis     10/25/2024  6:48 AM Rudy Jade [K59.00] Constipation                  Rudy Jade, DO  10/26/24 0332

## 2024-10-25 NOTE — ED ATTENDING ATTESTATION
10/25/2024  IAndrea MD, saw and evaluated the patient. I have discussed the patient with the resident/non-physician practitioner and agree with the resident's/non-physician practitioner's findings, Plan of Care, and MDM as documented in the resident's/non-physician practitioner's note, except where noted. All available labs and Radiology studies were reviewed.  I was present for key portions of any procedure(s) performed by the resident/non-physician practitioner and I was immediately available to provide assistance.       At this point I agree with the current assessment done in the Emergency Department.  I have conducted an independent evaluation of this patient a history and physical is as follows:      Final Diagnosis:  1. Pyelonephritis      Chief Complaint   Patient presents with    Chest Pain     Pt reports chest pain, head pain, and back pain for the last few days. Reports it hurts when she takes a deep breath            A:  -38-year-old female who presents with left-sided abdominal pain, flank pain, chest pain.      P:  - given the presentation, will check CBC for marked leukocytosis  - CMP for liver enzyme elevation that could signal cholecystitis, biliary obstructive disease. Check RFTs for MELVIN / markers of dehydration.  - Lipase given abdominal pain to evaluate specifically for pancreatitis.  - Given age, will do ekg as perhaps an atypical presentation of ACS.  - Urine: will check for UTI or signs of pyelonephritis.   - Pregnancy test: given she is female, could if positive, could be ectopic and would change workup to ultrasound instead of CT scan.   - Lastly, will consider abdominal imaging.  - CT AP w Contrast: r/o appendicitis, cholecystitis, bowel obstruction or other acute abdominal pathology. Would also demonstrate signs of pyelonephritis, cystitis.   -IV Toradol, Zofran, fluids for symptomatic treatment.  - Disposition per workup.        H:    38-year-old female who presents with  left-sided abdominal pain/flank pain/chest pain.  Started a little over 24 hours ago.  Continues to worsen.  Also complaining of suprapubic abdominal pain.  Pain made worse with deep inspiration.      PMH:  Past Medical History:   Diagnosis Date    Varicella     vaccine       PSH:  Past Surgical History:   Procedure Laterality Date     SECTION      PA  DELIVERY ONLY N/A 2021    Procedure:  SECTION () REPEAT;  Surgeon: Amber Shearer MD;  [facility];  Service: Obstetrics    PA LIG/TRNSXJ FALOPIAN TUBE  DEL/ABDML SURG Bilateral 2021    Procedure: LIGATION/COAGULATION TUBAL;  Surgeon: Amber Shearer MD;  [facility]: [facility];  Service: Obstetrics         PE:   Vitals:    10/25/24 0443   BP: 148/72   BP Location: Left arm   Pulse: (!) 112   Resp: 22   Temp: 98.9 °F (37.2 °C)   TempSrc: Axillary   SpO2: 99%         Constitutional: Appears uncomfortable..   HENT:   Mouth/Throat: Uvula is midline, oropharynx is clear and moist and mucous membranes are normal.   Eyes: Pupils are equal, round, and reactive to light. Conjunctivae and EOM are normal.   Neck: Trachea normal. No thyroid mass and no thyromegaly present.   Cardiovascular: Tachycardic, regular rhythm, normal heart sounds.   No murmur heard.  Pulmonary/Chest: Effort normal and breath sounds normal.   Abdominal: Soft. Normal appearance and bowel sounds are normal. There is epigastric/left upper quadrant tenderness. There is no rebound, no guarding.  Left CVA tenderness.  Neurological: She is alert.   Skin: Skin is warm, dry and intact.   Psychiatric: Tearful.        - 13 point ROS was performed and all are normal unless stated in the history above.   - Nursing note reviewed. Vitals reviewed.   - Orders placed by myself and/or advanced practitioner / resident.    - Previous chart was reviewed  - No language barrier.   - History obtained from patient.   - There are no limitations to the history obtained. Reasons ROS  could not be obtained:  N/A      ED Course as of 10/25/24 0645   Fri Oct 25, 2024   0623 CT Abdomen pelvis with contrast  Small, bilateral intrarenal stones.  No obvious ureterolithiasis.  Constipation as interpreted by myself.     Medications   ceftriaxone (ROCEPHIN) 1 g/50 mL in dextrose IVPB (has no administration in time range)   ketorolac (TORADOL) injection 15 mg (15 mg Intravenous Given 10/25/24 0517)   sodium chloride 0.9 % bolus 1,000 mL (1,000 mL Intravenous New Bag 10/25/24 0517)   ondansetron (ZOFRAN) injection 4 mg (4 mg Intravenous Given 10/25/24 0520)   iohexol (OMNIPAQUE) 350 MG/ML injection (SINGLE-DOSE) 85 mL (85 mL Intravenous Given 10/25/24 0615)     CT Abdomen pelvis with contrast   Final Result      Findings suspicious for early left pyelonephritis. No perinephric abscess.      Bilateral nonobstructing nephrolithiasis. No hydronephrosis.      The study was marked in EPIC for immediate notification.         Workstation performed: [id number]         XR chest 2 views    (Results Pending)     Orders Placed This Encounter   Procedures    CT Abdomen pelvis with contrast    XR chest 2 views    CBC and differential    CMP    Lipase    UA w Reflex to Microscopic w Reflex to Culture    Urine Microscopic    Insert peripheral IV    POCT pregnancy, urine     Labs Reviewed   CBC AND DIFFERENTIAL - Abnormal       Result Value Ref Range Status    WBC 11.07 (*) 4.31 - 10.16 Thousand/uL Final    RBC 4.28  3.81 - 5.12 Million/uL Final    Hemoglobin 11.5  11.5 - 15.4 g/dL Final    Hematocrit 35.0  34.8 - 46.1 % Final    MCV 82  82 - 98 fL Final    MCH 26.9  26.8 - 34.3 pg Final    MCHC 32.9  31.4 - 37.4 g/dL Final    RDW 14.9  11.6 - 15.1 % Final    MPV 10.2  8.9 - 12.7 fL Final    Platelets 219  149 - 390 Thousands/uL Final    nRBC 0  /100 WBCs Final    Segmented % 72  43 - 75 % Final    Immature Grans % 0  0 - 2 % Final    Lymphocytes % 17  14 - 44 % Final    Monocytes % 11  4 - 12 % Final    Eosinophils Relative  0  0 - 6 % Final    Basophils Relative 0  0 - 1 % Final    Absolute Neutrophils 7.87 (*) 1.85 - 7.62 Thousands/µL Final    Absolute Immature Grans 0.03  0.00 - 0.20 Thousand/uL Final    Absolute Lymphocytes 1.92  0.60 - 4.47 Thousands/µL Final    Absolute Monocytes 1.19  0.17 - 1.22 Thousand/µL Final    Eosinophils Absolute 0.04  0.00 - 0.61 Thousand/µL Final    Basophils Absolute 0.02  0.00 - 0.10 Thousands/µL Final   COMPREHENSIVE METABOLIC PANEL - Abnormal    Sodium 134 (*) 135 - 147 mmol/L Final    Potassium 4.2  3.5 - 5.3 mmol/L Final    Chloride 104  96 - 108 mmol/L Final    CO2 24  21 - 32 mmol/L Final    ANION GAP 6  4 - 13 mmol/L Final    BUN 14  5 - 25 mg/dL Final    Creatinine 0.89  0.60 - 1.30 mg/dL Final    Comment: Standardized to IDMS reference method    Glucose 105  65 - 140 mg/dL Final    Comment: If the patient is fasting, the ADA then defines impaired fasting glucose as > 100 mg/dL and diabetes as > or equal to 123 mg/dL.    Calcium 8.9  8.4 - 10.2 mg/dL Final    AST 18  13 - 39 U/L Final    ALT 18  7 - 52 U/L Final    Comment: Specimen collection should occur prior to Sulfasalazine administration due to the potential for falsely depressed results.     Alkaline Phosphatase 40  34 - 104 U/L Final    Total Protein 7.1  6.4 - 8.4 g/dL Final    Albumin 4.0  3.5 - 5.0 g/dL Final    Total Bilirubin 0.30  0.20 - 1.00 mg/dL Final    Comment: Use of this assay is not recommended for patients undergoing treatment with eltrombopag due to the potential for falsely elevated results.  N-acetyl-p-benzoquinone imine (metabolite of Acetaminophen) will generate erroneously low results in samples for patients that have taken an overdose of Acetaminophen.    eGFR 83  ml/min/1.73sq m Final    Narrative:     National Kid[employer] Disease Foundation guidelines for Chronic Kidney Disease (CKD):     Stage 1 with normal or high GFR (GFR > 90 mL/min/1.73 square meters)    Stage 2 Mild CKD (GFR = 60-89 mL/min/1.73 square meters)     Stage 3A Moderate CKD (GFR = 45-59 mL/min/1.73 square meters)    Stage 3B Moderate CKD (GFR = 30-44 mL/min/1.73 square meters)    Stage 4 Severe CKD (GFR = 15-29 mL/min/1.73 square meters)    Stage 5 End Stage CKD (GFR <15 mL/min/1.73 square meters)  Note: GFR calculation is accurate only with a steady state creatinine   UA W REFLEX TO MICROSCOPIC WITH REFLEX TO CULTURE - Abnormal    Color, UA Colorless   Final    Clarity, UA Clear   Final    Specific Gravity, UA 1.008  1.003 - 1.030 Final    pH, UA 7.5  4.5, 5.0, 5.5, 6.0, 6.5, 7.0, 7.5, 8.0 Final    Leukocytes, UA Negative  Negative Final    Nitrite, UA Negative  Negative Final    Protein, UA Negative  Negative mg/dl Final    Glucose, UA Negative  Negative mg/dl Final    Ketones, UA Negative  Negative mg/dl Final    Urobilinogen, UA <2.0  <2.0 mg/dl mg/dl Final    Bilirubin, UA Negative  Negative Final    Occult Blood, UA Trace (*) Negative Final   LIPASE - Normal    Lipase 36  11 - 82 u/L Final   POCT PREGNANCY, URINE - Normal    EXT Preg Test, Ur Negative   Final    Control Valid   Final   URINE MICROSCOPIC     Time reflects when diagnosis was documented in both MDM as applicable and the Disposition within this note       Time User Action Codes Description Comment    10/25/2024  6:45 AM Andrea Bowen Add [N12] Pyelonephritis           ED Disposition       ED Disposition   Discharge    Condition   Stable    Date/Time   Fri Oct 25, 2024  6:45 AM    Comment   Lety Castillo discharge to home/self care.                   Follow-up Information    None       Patient's Medications   Discharge Prescriptions    No medications on file     No discharge procedures on file.  Prior to Admission Medications   Prescriptions Last Dose Informant Patient Reported? Taking?   Ascorbic Acid (vitamin C) 1000 MG tablet  Self No No   Sig: Take 1 tablet (1,000 mg total) by mouth 2 (two) times a day   Patient not taking: Reported on 5/9/2023   Multiple Vitamin  "(multivitamin) tablet  Self No No   Sig: Take 1 tablet by mouth daily   Patient not taking: Reported on 5/9/2023   cholecalciferol (VITAMIN D3) 1,000 units tablet  Self No No   Sig: Take 2 tablets (2,000 Units total) by mouth daily   Patient not taking: Reported on 5/9/2023   ibuprofen (MOTRIN) 600 mg tablet   No No   Sig: Take 1 tablet (600 mg total) by mouth every 6 (six) hours as needed for mild pain      Facility-Administered Medications: None       Portions of the record may have been created with voice recognition software. Occasional wrong word or \"sound a like\" substitutions may have occurred due to the inherent limitations of voice recognition software. Read the chart carefully and recognize, using context, where substitutions have occurred.       ED Course  ED Course as of 10/25/24 0645   Fri Oct 25, 2024   0623 CT Abdomen pelvis with contrast  Small, bilateral intrarenal stones.  No obvious ureterolithiasis.  Constipation as interpreted by myself.         Critical Care Time  Procedures      "

## 2024-10-25 NOTE — DISCHARGE INSTRUCTIONS
Your scan showed that you have a left-sided kidney infection.  I will be giving you a course of antibiotics which you should take as prescribed the prescription is finished.  You should stop taking the other antibiotic you are prescribed.  Your scan did also show that you are very constipated.  Please use the stool softener as prescribed.  You can continue to use Tylenol and Motrin as needed for pain.  You should call to get set up with a primary care doctor with the number below.  You should return to the emergency room if you have severe uncontrolled pain, if you stop urinating, or if you have persistent nausea and vomiting.

## 2025-02-12 ENCOUNTER — TELEPHONE (OUTPATIENT)
Age: 40
End: 2025-02-12

## 2025-02-12 NOTE — TELEPHONE ENCOUNTER
Patient set up an appointment on my chart and will need an  for [place]. This is FYI thank you

## 2025-05-16 ENCOUNTER — OFFICE VISIT (OUTPATIENT)
Dept: DENTISTRY | Facility: CLINIC | Age: 40
End: 2025-05-16

## 2025-05-16 VITALS — HEART RATE: 71 BPM | DIASTOLIC BLOOD PRESSURE: 68 MMHG | SYSTOLIC BLOOD PRESSURE: 107 MMHG

## 2025-05-16 DIAGNOSIS — K08.89 PAIN, DENTAL: Primary | ICD-10-CM

## 2025-05-16 PROCEDURE — D0220 INTRAORAL - PERIAPICAL FIRST RADIOGRAPHIC IMAGE: HCPCS

## 2025-05-16 PROCEDURE — D0140 LIMITED ORAL EVALUATION - PROBLEM FOCUSED: HCPCS

## 2025-05-16 RX ORDER — AMOXICILLIN 500 MG/1
500 CAPSULE ORAL EVERY 8 HOURS SCHEDULED
Qty: 21 CAPSULE | Refills: 0 | Status: CANCELLED | OUTPATIENT
Start: 2025-05-16 | End: 2025-05-23

## 2025-05-19 NOTE — PROGRESS NOTES
Procedure Details  30  - INTRAORAL - PERIAPICAL FIRST RADIOGRAPHIC IMAGE   - LIMITED ORAL EVALUATION - PROBLEM FOCUSED  Limited Exam    Lety Castillo 39 y.o. female presents with self to [facility] for Limited exam  PMH reviewed, no changes, ASA II.     Chief complaint:  I have a lot of pain the bottom right    Consent:  Discussed that limited exam focuses on problem area, and same day tx is not guaranteed.  Patient explained to if they wish to have anything else evaluated, they need to return to the practice at which they are a patient of record or schedule a comprehensive exam afterwards.  Patient understands and consent was given by self via verbal consent.    Subjective history:   Pt reports still having pain in the LR. She reports also seeing [facility] to get #32 extracted from our referral.    Objective clinical findings:   Oral cancer screening: normal.   Extraoral exam: no remarkable findings.  Intraoral exam: existing #30 large GI restoration     Radiographs: Single PA - #30.       Plan:   #30 RCT --> #30 P/C --> perio maintenance    Referral(s): None needed.  Rx: None.  Comprehensive care disposition: Pt of record. Pt has been due for perio maintenance.    Patient dismissed ambulatory and alert.    NV1: #30  RCT  NV2: #30 post/core  NV3: perio maintenance    Attending: Dr. Christian and Dr. Zapien was present in clinic.

## 2025-05-19 NOTE — DENTAL PROCEDURE DETAILS
Limited Exam    Lety Castillo 39 y.o. female presents with self to [facility] for Limited exam  PMH reviewed, no changes, ASA II.     Chief complaint:  I have a lot of pain the bottom right    Consent:  Discussed that limited exam focuses on problem area, and same day tx is not guaranteed.  Patient explained to if they wish to have anything else evaluated, they need to return to the practice at which they are a patient of record or schedule a comprehensive exam afterwards.  Patient understands and consent was given by self via verbal consent.    Subjective history:   Pt reports still having pain in the LR. She reports also seeing [facility] to get #32 extracted from our referral.    Objective clinical findings:   Oral cancer screening: normal.   Extraoral exam: no remarkable findings.  Intraoral exam: existing #30 large GI restoration     Radiographs: Single PA - #30.       Plan:   #30 RCT --> #30 P/C --> perio maintenance    Referral(s): None needed.  Rx: None.  Comprehensive care disposition: Pt of record. Pt has been due for perio maintenance.    Patient dismissed ambulatory and alert.    NV1: #30  RCT  NV2: #30 post/core  NV3: perio maintenance    Attending: Dr. Christian and Dr. Zapien was present in clinic.

## 2025-06-03 ENCOUNTER — OFFICE VISIT (OUTPATIENT)
Dept: DENTISTRY | Facility: CLINIC | Age: 40
End: 2025-06-03

## 2025-06-03 VITALS — HEART RATE: 60 BPM | SYSTOLIC BLOOD PRESSURE: 118 MMHG | DIASTOLIC BLOOD PRESSURE: 78 MMHG

## 2025-06-03 DIAGNOSIS — K04.02 SYMPTOMATIC IRREVERSIBLE PULPITIS: Primary | ICD-10-CM

## 2025-06-03 PROCEDURE — D9110 PALLIATIVE (EMERGENCY) TREATMENT OF DENTAL PAIN - MINOR PROCEDURE: HCPCS

## 2025-06-03 NOTE — PROGRESS NOTES
Procedure Details  30  - PALLIATIVE (EMERGENCY) TREATMENT OF DENTAL PAIN - MINOR PROCEDURE  Palliative #30    Lety Castillo 39 y.o. female presents with self to [facility] for RCT #30.  PMH reviewed, no changes, ASA II.    Pt came to appt very late due to construction closing off roads. Explained that we will just complete access/palliative tx for today. Pt understood.    Diagnosis:  Pulpal - Irreversible Pulpitis  Periapical - Symptomatic apical periodontitis    Prognosis:  fair    Consent:  Risks of specific procedure: pain, swelling, infection, tooth fracture, perforation of tooth, chemical accident, breakage of endodontic instruments within canal, no guarantee against extraction.  Risks of any dental procedure: post procedural pain or sensitivity, local anesthetic side effects, allergic reaction to dental materials and medications, breakage of local anesthetic needle, aspiration of small dental tools, injury to nearby hard and soft tissues and anatomical structures.  Benefits: relieve pain or underlying infection, attempt to save tooth,.  Alternatives: extraction, no tx.  Tx plan for RCT #30 reviewed. Opportunity to ask questions given, all questions answered to degree of medical and dental certainty.  Patient understands and consent given by self via verbal consent and signed endodontic tx informed consent.    Anesthesia:  Topical 20% benzocaine.  1 carps 2% Lidocaine 1:100k epi via NADIA block.  0.5 carps 4% Septocaine 1:100k epi via buccal infiltration and palatal/lingual infiltration.    Access:  DryShield used  Pulp chamber accessed and caries removed with round carbide.  Access prep refined with Endo-Z bur.  Number of canals: 3.  Location of canal(s): MB, ML, D.  Working lengths not determined at appt.    Close:  Placed Calcium hydroxide and cotton pellet, restored with Cavit, and adjusted occlusion.    Patient dismissed ambulatory and alert.    NV: RCT #30.    Attending: Dr. Christian and   Sal were present in the clinic..

## 2025-06-03 NOTE — DENTAL PROCEDURE DETAILS
Palliative #30    Lety Castillo 39 y.o. female presents with self to [facility] for RCT #30.  PMH reviewed, no changes, ASA II.    Pt came to appt very late due to construction closing off roads. Explained that we will just complete access/palliative tx for today. Pt understood.    Diagnosis:  Pulpal - Irreversible Pulpitis  Periapical - Symptomatic apical periodontitis    Prognosis:  fair    Consent:  Risks of specific procedure: pain, swelling, infection, tooth fracture, perforation of tooth, chemical accident, breakage of endodontic instruments within canal, no guarantee against extraction.  Risks of any dental procedure: post procedural pain or sensitivity, local anesthetic side effects, allergic reaction to dental materials and medications, breakage of local anesthetic needle, aspiration of small dental tools, injury to nearby hard and soft tissues and anatomical structures.  Benefits: relieve pain or underlying infection, attempt to save tooth,.  Alternatives: extraction, no tx.  Tx plan for RCT #30 reviewed. Opportunity to ask questions given, all questions answered to degree of medical and dental certainty.  Patient understands and consent given by self via verbal consent and signed endodontic tx informed consent.    Anesthesia:  Topical 20% benzocaine.  1 carps 2% Lidocaine 1:100k epi via NADIA block.  0.5 carps 4% Septocaine 1:100k epi via buccal infiltration and palatal/lingual infiltration.    Access:  DryShield used  Pulp chamber accessed and caries removed with round carbide.  Access prep refined with Endo-Z bur.  Number of canals: 3.  Location of canal(s): MB, ML, D.  Working lengths not determined at appt.    Close:  Placed Calcium hydroxide and cotton pellet, restored with Cavit, and adjusted occlusion.    Patient dismissed ambulatory and alert.    NV: RCT #30.    Attending: Dr. Christian and Dr. Huang were present in the clinic..

## 2025-08-11 ENCOUNTER — APPOINTMENT (EMERGENCY)
Dept: RADIOLOGY | Facility: HOSPITAL | Age: 40
End: 2025-08-11
Payer: COMMERCIAL

## 2025-08-11 ENCOUNTER — HOSPITAL ENCOUNTER (EMERGENCY)
Facility: HOSPITAL | Age: 40
Discharge: HOME | End: 2025-08-11
Attending: EMERGENCY MEDICINE | Admitting: EMERGENCY MEDICINE
Payer: COMMERCIAL

## (undated) DEVICE — CHLORAPREP HI-LITE 26ML ORANGE

## (undated) DEVICE — Device

## (undated) DEVICE — SUT PLAIN 2-0 CTX 27 IN 872H

## (undated) DEVICE — GAUZE SPONGES,16 PLY: Brand: CURITY

## (undated) DEVICE — MEDI-VAC YANKAUER SUCTION HANDLE W/STRAIGHT TIP & CONTROL VENT: Brand: CARDINAL HEALTH

## (undated) DEVICE — GLOVE SRG BIOGEL ECLIPSE 8

## (undated) DEVICE — ABDOMINAL PAD: Brand: DERMACEA

## (undated) DEVICE — SUT VICRYL 4-0 KS 27 IN J662H

## (undated) DEVICE — TELFA NON-ADHERENT ABSORBENT DRESSING: Brand: TELFA

## (undated) DEVICE — SUT MONOCRYL 0 CTX 36 IN Y398H

## (undated) DEVICE — SKIN MARKER DUAL TIP WITH RULER CAP, FLEXIBLE RULER AND LABELS: Brand: DEVON

## (undated) DEVICE — SUT VICRYL 0 CT-1 27 IN J260H

## (undated) DEVICE — PACK C-SECTION PBDS